# Patient Record
Sex: MALE | Race: WHITE | NOT HISPANIC OR LATINO | Employment: FULL TIME | URBAN - METROPOLITAN AREA
[De-identification: names, ages, dates, MRNs, and addresses within clinical notes are randomized per-mention and may not be internally consistent; named-entity substitution may affect disease eponyms.]

---

## 2017-01-28 ENCOUNTER — TELEPHONE (OUTPATIENT)
Dept: INFECTIOUS DISEASES | Facility: CLINIC | Age: 67
End: 2017-01-28

## 2017-01-28 DIAGNOSIS — I10 ESSENTIAL HYPERTENSION: ICD-10-CM

## 2017-01-28 RX ORDER — METOPROLOL SUCCINATE 50 MG/1
50 TABLET, EXTENDED RELEASE ORAL DAILY
Qty: 10 TABLET | Refills: 3 | Status: SHIPPED | OUTPATIENT
Start: 2017-01-28 | End: 2017-01-30 | Stop reason: SDUPTHER

## 2017-01-30 DIAGNOSIS — E78.00 PURE HYPERCHOLESTEROLEMIA: ICD-10-CM

## 2017-01-30 DIAGNOSIS — I10 ESSENTIAL HYPERTENSION: ICD-10-CM

## 2017-01-30 RX ORDER — ATORVASTATIN CALCIUM 20 MG/1
20 TABLET, FILM COATED ORAL DAILY
Qty: 90 TABLET | Refills: 3 | Status: SHIPPED | OUTPATIENT
Start: 2017-01-30 | End: 2018-01-23 | Stop reason: SDUPTHER

## 2017-01-30 RX ORDER — METOPROLOL SUCCINATE 50 MG/1
50 TABLET, EXTENDED RELEASE ORAL DAILY
Qty: 90 TABLET | Refills: 3 | Status: SHIPPED | OUTPATIENT
Start: 2017-01-30 | End: 2018-01-23 | Stop reason: SDUPTHER

## 2017-03-20 ENCOUNTER — OFFICE VISIT (OUTPATIENT)
Dept: RHEUMATOLOGY | Facility: CLINIC | Age: 67
End: 2017-03-20
Payer: COMMERCIAL

## 2017-03-20 VITALS
HEART RATE: 63 BPM | DIASTOLIC BLOOD PRESSURE: 89 MMHG | SYSTOLIC BLOOD PRESSURE: 163 MMHG | WEIGHT: 200.38 LBS | HEIGHT: 73 IN | BODY MASS INDEX: 26.56 KG/M2

## 2017-03-20 DIAGNOSIS — M70.21 OLECRANON BURSITIS OF RIGHT ELBOW: Primary | ICD-10-CM

## 2017-03-20 LAB
APPEARANCE FLD: NORMAL
BODY FLD TYPE: NORMAL
BODY FLD TYPE: NORMAL
COLOR FLD: YELLOW
CRYSTALS FLD MICRO: NEGATIVE
LYMPHOCYTES NFR FLD MANUAL: 23 %
MONOS+MACROS NFR FLD MANUAL: 6 %
NEUTROPHILS NFR FLD MANUAL: 71 %
WBC # FLD: 1916 /CU MM

## 2017-03-20 PROCEDURE — 87205 SMEAR GRAM STAIN: CPT

## 2017-03-20 PROCEDURE — 89051 BODY FLUID CELL COUNT: CPT

## 2017-03-20 PROCEDURE — 89060 EXAM SYNOVIAL FLUID CRYSTALS: CPT

## 2017-03-20 PROCEDURE — 99212 OFFICE O/P EST SF 10 MIN: CPT | Mod: 25,S$GLB,, | Performed by: INTERNAL MEDICINE

## 2017-03-20 PROCEDURE — 87070 CULTURE OTHR SPECIMN AEROBIC: CPT

## 2017-03-20 PROCEDURE — 99999 PR PBB SHADOW E&M-EST. PATIENT-LVL III: CPT | Mod: PBBFAC,,, | Performed by: INTERNAL MEDICINE

## 2017-03-20 PROCEDURE — 20605 DRAIN/INJ JOINT/BURSA W/O US: CPT | Mod: RT,S$GLB,, | Performed by: INTERNAL MEDICINE

## 2017-03-20 ASSESSMENT — ROUTINE ASSESSMENT OF PATIENT INDEX DATA (RAPID3)
TOTAL RAPID3 SCORE: .17
PATIENT GLOBAL ASSESSMENT SCORE: 0
MDHAQ FUNCTION SCORE: 0
PSYCHOLOGICAL DISTRESS SCORE: 0
PAIN SCORE: .5
AM STIFFNESS SCORE: 0, NO
FATIGUE SCORE: 0

## 2017-03-20 NOTE — PROGRESS NOTES
"Subjective:       Patient ID: Jacques Munoz is a 67 y.o. male.    Chief Complaint: Disease Management    HPI   2 weeks ago he noted pain at the right olecranon and on palpation expressed some sebaceous material.  There was redness and tenderness and he took Bactrim for 1 week.  It improved.    Last week he had some persistent pain and swelling and he has taken some ibuprofen intermittently.    There is some persistent swelling and tenderness today and he requested that I take a look at it.  He has not had any systemic symptoms such as fever or chills or malaise or flulike symptoms  No other infections    Past medical history of hypertension and hyperlipidemia for which she takes metoprolol and atorvastatin    Review of Systems      Objective:   BP (!) 163/89  Pulse 63  Ht 6' 1" (1.854 m)  Wt 90.9 kg (200 lb 6.4 oz)  BMI 26.44 kg/m2     Physical Exam    Right elbow moves normally but there is swelling at the olecranon with some diffuse thickening and a quarter-sized area of erythema at the point of the olecranon.  It is slightly tender.  There is no axillary lymphadenopathy  Assessment:       1. Olecranon bursitis of right elbow        I suspect that this is mechanical, trauma related olecranon bursitis that is slowly improving.  However, he did initially have exquisite redness and tenderness that may have represented a low-grade streptococcal or staphylococcal olecranon bursitis which he treated with Bactrim.   Plan:            1.  In an abundance of caution, I will aspirate the olecranon to send fluid for cultures  2.  He will continue to treat conservatively by avoiding trauma to the olecranon and using NSAIDs as needed  3.  he will follow-up by telephone or as needed in the future      "

## 2017-03-20 NOTE — PROCEDURES
Intermediate Joint Aspiration/Injection  Date/Time: 3/20/2017 5:33 PM  Performed by: CINTIA HASSAN  Authorized by: CINTIA HASSAN     Consent Done?:  Yes (Verbal)  Indications:  Pain and diagnostic evaluation  Site marked: The procedure site was marked    Timeout: Prior to procedure the correct patient, procedure, and site was verified      Location:  Elbow  Site:  R olecranon bursa  Prep: Patient was prepped and draped in usual sterile fashion    Ultrasonic Guidance for needle placement: No  Needle size:  22 G  Approach:  Superior  Aspirate amount (ml):  6  Aspirate:  Clear and yellow  Lab: Fluid sent for laboratory analysis    Patient tolerance:  Patient tolerated the procedure well with no immediate complications

## 2017-03-21 LAB
GRAM STN SPEC: NORMAL
GRAM STN SPEC: NORMAL
PATH INTERP FLD-IMP: NORMAL

## 2017-03-23 DIAGNOSIS — M70.21 OLECRANON BURSITIS OF RIGHT ELBOW: Primary | ICD-10-CM

## 2017-03-24 LAB — BACTERIA SPEC AEROBE CULT: NO GROWTH

## 2017-03-28 ENCOUNTER — OFFICE VISIT (OUTPATIENT)
Dept: RHEUMATOLOGY | Facility: CLINIC | Age: 67
End: 2017-03-28
Payer: COMMERCIAL

## 2017-03-28 VITALS
HEART RATE: 52 BPM | SYSTOLIC BLOOD PRESSURE: 153 MMHG | HEIGHT: 73 IN | BODY MASS INDEX: 25.78 KG/M2 | WEIGHT: 194.5 LBS | DIASTOLIC BLOOD PRESSURE: 93 MMHG

## 2017-03-28 DIAGNOSIS — M70.21 OLECRANON BURSITIS OF RIGHT ELBOW: Primary | ICD-10-CM

## 2017-03-28 PROCEDURE — 20605 DRAIN/INJ JOINT/BURSA W/O US: CPT | Mod: RT,S$GLB,, | Performed by: INTERNAL MEDICINE

## 2017-03-28 PROCEDURE — 99999 PR PBB SHADOW E&M-EST. PATIENT-LVL III: CPT | Mod: PBBFAC,,, | Performed by: INTERNAL MEDICINE

## 2017-03-28 PROCEDURE — 99499 UNLISTED E&M SERVICE: CPT | Mod: S$GLB,,, | Performed by: INTERNAL MEDICINE

## 2017-03-28 RX ORDER — TRIAMCINOLONE ACETONIDE 40 MG/ML
40 INJECTION, SUSPENSION INTRA-ARTICULAR; INTRAMUSCULAR
Status: DISCONTINUED | OUTPATIENT
Start: 2017-03-28 | End: 2017-03-28 | Stop reason: HOSPADM

## 2017-03-28 RX ADMIN — TRIAMCINOLONE ACETONIDE 40 MG: 40 INJECTION, SUSPENSION INTRA-ARTICULAR; INTRAMUSCULAR at 05:03

## 2017-03-28 ASSESSMENT — ROUTINE ASSESSMENT OF PATIENT INDEX DATA (RAPID3)
PAIN SCORE: 0
AM STIFFNESS SCORE: 0, NO
TOTAL RAPID3 SCORE: 0
FATIGUE SCORE: 0
MDHAQ FUNCTION SCORE: 0
PSYCHOLOGICAL DISTRESS SCORE: 0
PATIENT GLOBAL ASSESSMENT SCORE: 0

## 2017-03-28 NOTE — PROCEDURES
Intermediate Joint Aspiration/Injection  Date/Time: 3/28/2017 5:14 PM  Performed by: CINTIA HASSAN  Authorized by: CINTIA HASSAN     Consent Done?:  Yes (Verbal)  Indications:  Joint swelling  Site marked: The procedure site was marked    Timeout: Prior to procedure the correct patient, procedure, and site was verified      Location:  Elbow  Site:  R olecranon bursa  Prep: Patient was prepped and draped in usual sterile fashion    Ultrasonic Guidance for needle placement: No  Needle size:  20 G  Medications:  40 mg triamcinolone acetonide 40 mg/mL  Aspirate amount (ml):  4.5  Aspirate:  Clear and yellow  Patient tolerance:  Patient tolerated the procedure well with no immediate complications

## 2017-05-18 ENCOUNTER — LAB VISIT (OUTPATIENT)
Dept: LAB | Facility: HOSPITAL | Age: 67
End: 2017-05-18
Attending: INTERNAL MEDICINE
Payer: COMMERCIAL

## 2017-05-18 DIAGNOSIS — I10 ESSENTIAL HYPERTENSION: ICD-10-CM

## 2017-05-18 DIAGNOSIS — M70.21 OLECRANON BURSITIS OF RIGHT ELBOW: ICD-10-CM

## 2017-05-18 DIAGNOSIS — E78.5 HYPERLIPIDEMIA: ICD-10-CM

## 2017-05-18 LAB
ALBUMIN SERPL BCP-MCNC: 3.7 G/DL
ALP SERPL-CCNC: 74 U/L
ALT SERPL W/O P-5'-P-CCNC: 24 U/L
ANION GAP SERPL CALC-SCNC: 4 MMOL/L
AST SERPL-CCNC: 23 U/L
BILIRUB SERPL-MCNC: 1.8 MG/DL
BUN SERPL-MCNC: 11 MG/DL
CALCIUM SERPL-MCNC: 9.1 MG/DL
CHLORIDE SERPL-SCNC: 101 MMOL/L
CHOLEST/HDLC SERPL: 2.4 {RATIO}
CO2 SERPL-SCNC: 31 MMOL/L
COMPLEXED PSA SERPL-MCNC: 0.64 NG/ML
CREAT SERPL-MCNC: 1 MG/DL
ERYTHROCYTE [DISTWIDTH] IN BLOOD BY AUTOMATED COUNT: 12.9 %
EST. GFR  (AFRICAN AMERICAN): >60 ML/MIN/1.73 M^2
EST. GFR  (NON AFRICAN AMERICAN): >60 ML/MIN/1.73 M^2
GLUCOSE SERPL-MCNC: 109 MG/DL
HCT VFR BLD AUTO: 45.8 %
HDL/CHOLESTEROL RATIO: 41.7 %
HDLC SERPL-MCNC: 223 MG/DL
HDLC SERPL-MCNC: 93 MG/DL
HGB BLD-MCNC: 15.6 G/DL
LDLC SERPL CALC-MCNC: 120.2 MG/DL
MCH RBC QN AUTO: 32.8 PG
MCHC RBC AUTO-ENTMCNC: 34.1 %
MCV RBC AUTO: 96 FL
NEUTROPHILS # BLD AUTO: 2.3 K/UL
NONHDLC SERPL-MCNC: 130 MG/DL
PLATELET # BLD AUTO: 180 K/UL
PMV BLD AUTO: 9.7 FL
POTASSIUM SERPL-SCNC: 4 MMOL/L
PROT SERPL-MCNC: 6.7 G/DL
RBC # BLD AUTO: 4.75 M/UL
SODIUM SERPL-SCNC: 136 MMOL/L
TRIGL SERPL-MCNC: 49 MG/DL
TSH SERPL DL<=0.005 MIU/L-ACNC: 1.42 UIU/ML
URATE SERPL-MCNC: 5.6 MG/DL
WBC # BLD AUTO: 4.87 K/UL

## 2017-05-18 PROCEDURE — 80053 COMPREHEN METABOLIC PANEL: CPT

## 2017-05-18 PROCEDURE — 85027 COMPLETE CBC AUTOMATED: CPT

## 2017-05-18 PROCEDURE — 36415 COLL VENOUS BLD VENIPUNCTURE: CPT

## 2017-05-18 PROCEDURE — 84443 ASSAY THYROID STIM HORMONE: CPT

## 2017-05-18 PROCEDURE — 84153 ASSAY OF PSA TOTAL: CPT

## 2017-05-18 PROCEDURE — 84550 ASSAY OF BLOOD/URIC ACID: CPT

## 2017-05-18 PROCEDURE — 80061 LIPID PANEL: CPT

## 2017-05-22 ENCOUNTER — OFFICE VISIT (OUTPATIENT)
Dept: HEMATOLOGY/ONCOLOGY | Facility: CLINIC | Age: 67
End: 2017-05-22
Payer: COMMERCIAL

## 2017-05-22 VITALS
SYSTOLIC BLOOD PRESSURE: 138 MMHG | RESPIRATION RATE: 17 BRPM | WEIGHT: 195.13 LBS | BODY MASS INDEX: 25.74 KG/M2 | TEMPERATURE: 98 F | DIASTOLIC BLOOD PRESSURE: 84 MMHG | HEART RATE: 74 BPM

## 2017-05-22 DIAGNOSIS — I10 ESSENTIAL HYPERTENSION: Primary | ICD-10-CM

## 2017-05-22 DIAGNOSIS — E78.00 PURE HYPERCHOLESTEROLEMIA: ICD-10-CM

## 2017-05-22 PROCEDURE — 99999 PR PBB SHADOW E&M-EST. PATIENT-LVL III: CPT | Mod: PBBFAC,,, | Performed by: INTERNAL MEDICINE

## 2017-05-22 PROCEDURE — 99213 OFFICE O/P EST LOW 20 MIN: CPT | Mod: S$GLB,,, | Performed by: INTERNAL MEDICINE

## 2017-05-22 NOTE — PROGRESS NOTES
Subjective:       Patient ID: Jacques Munoz is a 67 y.o. male.    Chief Complaint: No chief complaint on file.  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           HPI    Review of Systems   Constitutional: Negative for activity change, appetite change, fever and unexpected weight change.   Eyes: Positive for pain. Negative for visual disturbance.   Respiratory: Negative for cough, choking and shortness of breath.    Cardiovascular: Negative for chest pain.   Gastrointestinal: Negative for abdominal pain, blood in stool, constipation and diarrhea.   Genitourinary: Negative for dysuria and frequency.   Musculoskeletal: Negative for back pain.   Skin: Negative for rash.   Neurological: Negative for headaches.   Hematological: Negative for adenopathy.   Psychiatric/Behavioral: Negative for dysphoric mood. The patient is not nervous/anxious.        Objective:      Physical Exam   Constitutional: He is oriented to person, place, and time. He appears well-developed and well-nourished. No distress.   Eyes: No scleral icterus.   Neck: No JVD present.   Cardiovascular: Normal rate, regular rhythm and normal heart sounds.    Pulmonary/Chest: Effort normal and breath sounds normal. He has no wheezes. He has no rales.   Abdominal: Soft. He exhibits no mass. There is no tenderness. Hernia confirmed negative in the right inguinal area and confirmed negative in the left inguinal area.   Genitourinary: Rectum normal, testes normal and penis normal. Rectal exam shows anal tone normal and guaiac negative stool. Prostate is not enlarged and not tender. Right testis shows no mass. Left testis shows no mass.   Musculoskeletal: He exhibits no edema.   Lymphadenopathy:     He has no cervical adenopathy.     He has no axillary adenopathy.        Right: No supraclavicular adenopathy present.         Left: No supraclavicular adenopathy present.   Neurological: He is alert and oriented to person, place, and time. No cranial nerve deficit.   Skin: No rash noted.   Psychiatric: He has a normal mood and affect. His behavior is normal. Thought content normal.       Assessment:    CBC and metabolic profile are unremarkable, PSA and TSH were normal. Chol 223 with , HDL 93    1. Essential hypertension    2. Pure hypercholesterolemia        Plan:       He will continue his current medications and return in one year for followup with labs.

## 2017-06-01 ENCOUNTER — OFFICE VISIT (OUTPATIENT)
Dept: OPHTHALMOLOGY | Facility: CLINIC | Age: 67
End: 2017-06-01
Payer: COMMERCIAL

## 2017-06-01 DIAGNOSIS — S05.02XA CORNEAL ABRASION, LEFT, INITIAL ENCOUNTER: Primary | ICD-10-CM

## 2017-06-01 PROCEDURE — 99999 PR PBB SHADOW E&M-EST. PATIENT-LVL II: CPT | Mod: PBBFAC,,, | Performed by: OPHTHALMOLOGY

## 2017-06-01 PROCEDURE — 92012 INTRM OPH EXAM EST PATIENT: CPT | Mod: S$GLB,,, | Performed by: OPHTHALMOLOGY

## 2017-06-01 NOTE — PROGRESS NOTES
HPI     Eye Pain    Additional comments: woke up about 5:00 this a.m. pain and tearing os           Comments   Instilled 1 drop of proparacain os at 8:50       Last edited by Rowena Chu on 6/1/2017  8:53 AM. (History)            Assessment /Plan     For exam results, see Encounter Report.    Corneal abrasion, left, initial encounter      Patient with small central multifocal abrasion of unknown etiology. Place bcl today and start vigamox qid os.  Discussed possible very early viral etiology but patient denies any prior history of herpetic viral episodes.  Return tomorrow for follow-up with cornea for consideration of removal of bcl if epithelial defect is healed.                             family

## 2017-06-01 NOTE — PROGRESS NOTES
Assessment /Plan     For exam results, see Encounter Report.    Corneal abrasion, left, initial encounter      ***

## 2017-06-02 ENCOUNTER — OFFICE VISIT (OUTPATIENT)
Dept: OPHTHALMOLOGY | Facility: CLINIC | Age: 67
End: 2017-06-02
Payer: COMMERCIAL

## 2017-06-02 DIAGNOSIS — B00.52 HERPES KERATITIS OF LEFT EYE: Primary | ICD-10-CM

## 2017-06-02 PROCEDURE — 92014 COMPRE OPH EXAM EST PT 1/>: CPT | Mod: S$GLB,,, | Performed by: OPHTHALMOLOGY

## 2017-06-02 PROCEDURE — 99999 PR PBB SHADOW E&M-EST. PATIENT-LVL II: CPT | Mod: PBBFAC,,, | Performed by: OPHTHALMOLOGY

## 2017-06-02 RX ORDER — VALACYCLOVIR HYDROCHLORIDE 500 MG/1
500 TABLET, FILM COATED ORAL 3 TIMES DAILY
Qty: 30 TABLET | Refills: 0 | Status: SHIPPED | OUTPATIENT
Start: 2017-06-02 | End: 2019-11-19 | Stop reason: CLARIF

## 2017-07-10 RX ORDER — AZITHROMYCIN 250 MG/1
TABLET, FILM COATED ORAL
Qty: 6 TABLET | Refills: 0 | Status: SHIPPED | OUTPATIENT
Start: 2017-07-10 | End: 2017-09-13

## 2017-09-13 RX ORDER — ZOLPIDEM TARTRATE 5 MG/1
5 TABLET ORAL NIGHTLY PRN
Qty: 30 TABLET | Refills: 4 | Status: SHIPPED | OUTPATIENT
Start: 2017-09-13 | End: 2021-05-24 | Stop reason: ALTCHOICE

## 2017-09-13 RX ORDER — AZITHROMYCIN 500 MG/1
500 TABLET, FILM COATED ORAL DAILY
Qty: 3 TABLET | Refills: 0 | Status: SHIPPED | OUTPATIENT
Start: 2017-09-13 | End: 2017-09-16

## 2017-09-18 ENCOUNTER — CLINICAL SUPPORT (OUTPATIENT)
Dept: INFECTIOUS DISEASES | Facility: CLINIC | Age: 67
End: 2017-09-18
Payer: COMMERCIAL

## 2017-09-18 DIAGNOSIS — Z23 NEED FOR VACCINATION: Primary | ICD-10-CM

## 2017-09-18 PROCEDURE — 90662 IIV NO PRSV INCREASED AG IM: CPT | Mod: S$GLB,,, | Performed by: INTERNAL MEDICINE

## 2017-09-18 PROCEDURE — 90471 IMMUNIZATION ADMIN: CPT | Mod: S$GLB,,, | Performed by: INTERNAL MEDICINE

## 2017-09-18 PROCEDURE — 99999 PR PBB SHADOW E&M-EST. PATIENT-LVL I: CPT | Mod: PBBFAC,,,

## 2017-11-30 RX ORDER — OSELTAMIVIR PHOSPHATE 75 MG/1
75 CAPSULE ORAL 2 TIMES DAILY
Qty: 10 CAPSULE | Refills: 0 | Status: SHIPPED | OUTPATIENT
Start: 2017-11-30 | End: 2017-12-05

## 2017-12-04 RX ORDER — AZITHROMYCIN 250 MG/1
TABLET, FILM COATED ORAL
Qty: 6 TABLET | Refills: 0 | Status: SHIPPED | OUTPATIENT
Start: 2017-12-04 | End: 2018-03-08

## 2018-01-12 ENCOUNTER — OFFICE VISIT (OUTPATIENT)
Dept: DERMATOLOGY | Facility: CLINIC | Age: 68
End: 2018-01-12
Payer: COMMERCIAL

## 2018-01-12 DIAGNOSIS — Z12.83 SCREENING EXAM FOR SKIN CANCER: ICD-10-CM

## 2018-01-12 DIAGNOSIS — L82.1 SEBORRHEIC KERATOSIS: ICD-10-CM

## 2018-01-12 DIAGNOSIS — L82.0 INFLAMED SEBORRHEIC KERATOSIS: Primary | ICD-10-CM

## 2018-01-12 DIAGNOSIS — D18.00 ANGIOMA: ICD-10-CM

## 2018-01-12 DIAGNOSIS — L81.4 LENTIGO: ICD-10-CM

## 2018-01-12 PROCEDURE — 17110 DESTRUCTION B9 LES UP TO 14: CPT | Mod: S$GLB,,, | Performed by: DERMATOLOGY

## 2018-01-12 PROCEDURE — 99213 OFFICE O/P EST LOW 20 MIN: CPT | Mod: 25,S$GLB,, | Performed by: DERMATOLOGY

## 2018-01-12 PROCEDURE — 99999 PR PBB SHADOW E&M-EST. PATIENT-LVL II: CPT | Mod: PBBFAC,,, | Performed by: DERMATOLOGY

## 2018-01-12 NOTE — PATIENT INSTRUCTIONS

## 2018-01-23 DIAGNOSIS — E78.00 PURE HYPERCHOLESTEROLEMIA: ICD-10-CM

## 2018-01-23 DIAGNOSIS — I10 ESSENTIAL HYPERTENSION: ICD-10-CM

## 2018-01-23 RX ORDER — ATORVASTATIN CALCIUM 20 MG/1
TABLET, FILM COATED ORAL
Qty: 90 TABLET | Refills: 2 | Status: SHIPPED | OUTPATIENT
Start: 2018-01-23 | End: 2018-10-23

## 2018-01-23 RX ORDER — METOPROLOL SUCCINATE 50 MG/1
50 TABLET, EXTENDED RELEASE ORAL DAILY
Qty: 90 TABLET | Refills: 3 | Status: SHIPPED | OUTPATIENT
Start: 2018-01-23 | End: 2019-01-28

## 2018-03-08 ENCOUNTER — OFFICE VISIT (OUTPATIENT)
Dept: UROLOGY | Facility: CLINIC | Age: 68
End: 2018-03-08
Payer: COMMERCIAL

## 2018-03-08 VITALS
BODY MASS INDEX: 25.74 KG/M2 | RESPIRATION RATE: 16 BRPM | WEIGHT: 194.25 LBS | SYSTOLIC BLOOD PRESSURE: 144 MMHG | HEART RATE: 60 BPM | HEIGHT: 73 IN | DIASTOLIC BLOOD PRESSURE: 87 MMHG

## 2018-03-08 DIAGNOSIS — N40.1 BENIGN PROSTATIC HYPERPLASIA WITH NOCTURIA: ICD-10-CM

## 2018-03-08 DIAGNOSIS — R35.1 BENIGN PROSTATIC HYPERPLASIA WITH NOCTURIA: ICD-10-CM

## 2018-03-08 LAB
BILIRUB SERPL-MCNC: NORMAL MG/DL
BLOOD URINE, POC: NORMAL
COLOR, POC UA: NORMAL
GLUCOSE UR QL STRIP: NORMAL
KETONES UR QL STRIP: NORMAL
LEUKOCYTE ESTERASE URINE, POC: NORMAL
NITRITE, POC UA: NORMAL
PH, POC UA: 5
POC RESIDUAL URINE VOLUME: 98 ML (ref 0–100)
PROTEIN, POC: NORMAL
SPECIFIC GRAVITY, POC UA: 1.02
UROBILINOGEN, POC UA: NORMAL

## 2018-03-08 PROCEDURE — 3077F SYST BP >= 140 MM HG: CPT | Mod: S$GLB,,, | Performed by: UROLOGY

## 2018-03-08 PROCEDURE — 3079F DIAST BP 80-89 MM HG: CPT | Mod: S$GLB,,, | Performed by: UROLOGY

## 2018-03-08 PROCEDURE — 51798 US URINE CAPACITY MEASURE: CPT | Mod: S$GLB,,, | Performed by: UROLOGY

## 2018-03-08 PROCEDURE — 81002 URINALYSIS NONAUTO W/O SCOPE: CPT | Mod: S$GLB,,, | Performed by: UROLOGY

## 2018-03-08 PROCEDURE — 99999 PR PBB SHADOW E&M-EST. PATIENT-LVL III: CPT | Mod: PBBFAC,,, | Performed by: UROLOGY

## 2018-03-08 PROCEDURE — 99203 OFFICE O/P NEW LOW 30 MIN: CPT | Mod: 25,S$GLB,, | Performed by: UROLOGY

## 2018-03-08 RX ORDER — TAMSULOSIN HYDROCHLORIDE 0.4 MG/1
0.4 CAPSULE ORAL DAILY
Qty: 90 CAPSULE | Refills: 3 | Status: SHIPPED | OUTPATIENT
Start: 2018-03-08 | End: 2019-11-19 | Stop reason: CLARIF

## 2018-03-08 NOTE — LETTER
March 8, 2018        Wilmer Ohara MD  [address] [facility] [address]             [facility]  [address]  [place] [address]  Phone: [phone]   Patient: Jacques Munoz M.D.   MR Number: [id number]   YOB: 1950   Date of Visit: 3/8/2018       Dear Dr. Ohara:    Thank you for referring Jacques Munoz M.D. to me for evaluation. Attached you will find relevant portions of my assessment and plan of care.    If you have questions, please do not hesitate to call me. I look forward to following Jacques Munoz M.D. along with you.    Sincerely,      Wes Weldon MD            CC  No Recipients    Enclosure

## 2018-03-08 NOTE — PROGRESS NOTES
Clinic Note  3/8/2018      Subjective:         Chief Complaint:   HPI  Jacques Munoz is a 68 y.o. male with a history of BPH. Has noted increased daytime frequency and nocturia (4 x/noc). Voiding every two hours. Symptoms progressive for 2 years but worse in last two months.  Last visit 12/13/2011 for prostatitis.  post void residual today- 98 ccs      Lab Results   Component Value Date    PSA 0.64 05/18/2017    PSA 0.63 04/21/2016    PSA 0.52 04/27/2015    PSA 0.63 04/22/2014    PSA 0.57 04/22/2013    PSA 0.96 02/13/2012    PSA 0.54 01/10/2011    PSA 0.39 12/02/2009    PSA 0.46 12/04/2008    PSA 0.4 11/27/2007      Past Medical History:   Diagnosis Date    Hypertension      Family History   Problem Relation Age of Onset    Hypertension Mother     Hypertension Father     Melanoma Neg Hx      Social History     Social History    Marital status:      Spouse name: N/A    Number of children: N/A    Years of education: N/A     Occupational History    physician      Social History Main Topics    Smoking status: Never Smoker    Smokeless tobacco: Never Used    Alcohol use Yes    Drug use: Unknown    Sexual activity: Not on file     Other Topics Concern    Not on file     Social History Narrative    No narrative on file     Past Surgical History:   Procedure Laterality Date    TONSILLECTOMY       Patient Active Problem List   Diagnosis    Hypertension    Hyperlipidemia    Presbyopia - Both Eyes    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   HENT: Negative for nosebleeds.    Respiratory: Negative for shortness of breath and wheezing.    Cardiovascular: Negative for chest  "pain, palpitations and leg swelling.   Gastrointestinal: Negative for abdominal distention, abdominal pain, constipation, diarrhea, nausea and vomiting.   Genitourinary: Positive for difficulty urinating, frequency and nocturia. Negative for dysuria and hematuria.   Musculoskeletal: Negative for arthralgias and back pain.   Skin: Negative for pallor.   Neurological: Negative for dizziness, seizures and syncope.   Hematological: Negative for adenopathy.   Psychiatric/Behavioral: Negative for dysphoric mood.         Objective:      There were no vitals taken for this visit.  Estimated body mass index is 25.74 kg/m² as calculated from the following:    Height as of 3/28/17: 6' 1" (1.854 m).    Weight as of 5/22/17: 88.5 kg (195 lb 1.7 oz).  Physical Exam   Genitourinary: Rectum normal and prostate normal. Rectal exam shows no external hemorrhoid, no internal hemorrhoid, no mass and no tenderness.         Assessment and Plan:           Problem List Items Addressed This Visit     None          Follow up:   BPH  Flomax    Wes Weldon        "

## 2018-05-01 ENCOUNTER — LAB VISIT (OUTPATIENT)
Dept: LAB | Facility: HOSPITAL | Age: 68
End: 2018-05-01
Attending: INTERNAL MEDICINE
Payer: COMMERCIAL

## 2018-05-01 DIAGNOSIS — I10 ESSENTIAL HYPERTENSION: ICD-10-CM

## 2018-05-01 DIAGNOSIS — E78.00 PURE HYPERCHOLESTEROLEMIA: ICD-10-CM

## 2018-05-01 LAB
ALBUMIN SERPL BCP-MCNC: 3.7 G/DL
ALP SERPL-CCNC: 76 U/L
ALT SERPL W/O P-5'-P-CCNC: 20 U/L
ANION GAP SERPL CALC-SCNC: 7 MMOL/L
AST SERPL-CCNC: 26 U/L
BILIRUB SERPL-MCNC: 2.2 MG/DL
BUN SERPL-MCNC: 12 MG/DL
CALCIUM SERPL-MCNC: 9.3 MG/DL
CHLORIDE SERPL-SCNC: 106 MMOL/L
CHOLEST SERPL-MCNC: 192 MG/DL
CHOLEST/HDLC SERPL: 2.4 {RATIO}
CO2 SERPL-SCNC: 28 MMOL/L
COMPLEXED PSA SERPL-MCNC: 0.67 NG/ML
CREAT SERPL-MCNC: 0.8 MG/DL
ERYTHROCYTE [DISTWIDTH] IN BLOOD BY AUTOMATED COUNT: 12 %
EST. GFR  (AFRICAN AMERICAN): >60 ML/MIN/1.73 M^2
EST. GFR  (NON AFRICAN AMERICAN): >60 ML/MIN/1.73 M^2
GLUCOSE SERPL-MCNC: 104 MG/DL
HCT VFR BLD AUTO: 43.6 %
HDLC SERPL-MCNC: 81 MG/DL
HDLC SERPL: 42.2 %
HGB BLD-MCNC: 15.1 G/DL
IMM GRANULOCYTES # BLD AUTO: 0.03 K/UL
LDLC SERPL CALC-MCNC: 89 MG/DL
MCH RBC QN AUTO: 32.9 PG
MCHC RBC AUTO-ENTMCNC: 34.6 G/DL
MCV RBC AUTO: 95 FL
NEUTROPHILS # BLD AUTO: 2.4 K/UL
NONHDLC SERPL-MCNC: 111 MG/DL
PLATELET # BLD AUTO: 207 K/UL
PMV BLD AUTO: 9.7 FL
POTASSIUM SERPL-SCNC: 4 MMOL/L
PROT SERPL-MCNC: 6.5 G/DL
RBC # BLD AUTO: 4.59 M/UL
SODIUM SERPL-SCNC: 141 MMOL/L
TRIGL SERPL-MCNC: 110 MG/DL
TSH SERPL DL<=0.005 MIU/L-ACNC: 2.08 UIU/ML
WBC # BLD AUTO: 5.38 K/UL

## 2018-05-01 PROCEDURE — 36415 COLL VENOUS BLD VENIPUNCTURE: CPT

## 2018-05-01 PROCEDURE — 80061 LIPID PANEL: CPT

## 2018-05-01 PROCEDURE — 80053 COMPREHEN METABOLIC PANEL: CPT

## 2018-05-01 PROCEDURE — 85027 COMPLETE CBC AUTOMATED: CPT

## 2018-05-01 PROCEDURE — 84443 ASSAY THYROID STIM HORMONE: CPT

## 2018-05-01 PROCEDURE — 84153 ASSAY OF PSA TOTAL: CPT

## 2018-05-07 NOTE — PROGRESS NOTES
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    Chief Complaint: No chief complaint on file.      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    Overall he's been doing well.  He had a recent episode of some increased nocturia and was seen in urology by Dr. Weldon.  That evaluation was negative.  The symptoms have resolved.    He's going to follow-up in cardiology for his aortic aneurysm in the early part of next year.  He continues to be active exercising on a regular basis.  Has some minor arthralgias in his hands but does not take any medication for that.  He reports his blood pressure at home has been running in the 130/80 range.    Review of Systems   Constitutional: Negative for activity change, appetite change, fever and unexpected weight change.   Eyes: Positive for pain. Negative for visual disturbance.   Respiratory: Negative for cough, choking and shortness of breath.    Cardiovascular: Negative for chest pain.   Gastrointestinal: Negative for abdominal pain, blood in stool, constipation and diarrhea.   Genitourinary: Negative for dysuria and frequency.   Musculoskeletal: Negative for back pain.   Skin: Negative for rash.   Neurological: Negative for headaches.   Hematological: Negative for adenopathy.   Psychiatric/Behavioral: Negative for dysphoric mood. The patient is not nervous/anxious.        Objective:      Physical Exam   Constitutional: He is oriented to person, place, and time. He appears well-developed and well-nourished. No distress.   Eyes: No scleral icterus.   Neck: No JVD present.   Cardiovascular: Normal rate, regular rhythm and normal heart sounds.    Pulmonary/Chest: Effort normal and breath sounds normal. He has no wheezes. He has no rales.   Abdominal: Soft. He exhibits no mass. There is no tenderness. Hernia  confirmed negative in the right inguinal area and confirmed negative in the left inguinal area.   Genitourinary: Rectum normal, testes normal and penis normal. Rectal exam shows anal tone normal and guaiac negative stool. Prostate is not enlarged and not tender. Right testis shows no mass. Left testis shows no mass.   Musculoskeletal: He exhibits no edema.   Lymphadenopathy:     He has no cervical adenopathy.     He has no axillary adenopathy.        Right: No supraclavicular adenopathy present.        Left: No supraclavicular adenopathy present.   Neurological: He is alert and oriented to person, place, and time. No cranial nerve deficit.   Skin: No rash noted.   Psychiatric: He has a normal mood and affect. His behavior is normal. Thought content normal.       Assessment:    CBC and metabolic profile are unremarkable, PSA and TSH are normal, lipid profile is normal.    1. Essential hypertension    2. Pure hypercholesterolemia        Plan:     Follow-up in cardiology as planned  He will continue his current medications and return in one year for followup with labs.

## 2018-05-08 ENCOUNTER — OFFICE VISIT (OUTPATIENT)
Dept: HEMATOLOGY/ONCOLOGY | Facility: CLINIC | Age: 68
End: 2018-05-08
Payer: COMMERCIAL

## 2018-05-08 VITALS
SYSTOLIC BLOOD PRESSURE: 150 MMHG | WEIGHT: 194.25 LBS | BODY MASS INDEX: 25.62 KG/M2 | DIASTOLIC BLOOD PRESSURE: 95 MMHG

## 2018-05-08 DIAGNOSIS — Z12.5 PROSTATE CANCER SCREENING: ICD-10-CM

## 2018-05-08 DIAGNOSIS — I10 ESSENTIAL HYPERTENSION: Primary | ICD-10-CM

## 2018-05-08 DIAGNOSIS — E78.00 PURE HYPERCHOLESTEROLEMIA: ICD-10-CM

## 2018-05-08 PROCEDURE — 99213 OFFICE O/P EST LOW 20 MIN: CPT | Mod: S$GLB,,, | Performed by: INTERNAL MEDICINE

## 2018-05-08 PROCEDURE — 3080F DIAST BP >= 90 MM HG: CPT | Mod: CPTII,S$GLB,, | Performed by: INTERNAL MEDICINE

## 2018-05-08 PROCEDURE — 99999 PR PBB SHADOW E&M-EST. PATIENT-LVL I: CPT | Mod: PBBFAC,,, | Performed by: INTERNAL MEDICINE

## 2018-05-08 PROCEDURE — 3077F SYST BP >= 140 MM HG: CPT | Mod: CPTII,S$GLB,, | Performed by: INTERNAL MEDICINE

## 2018-10-23 DIAGNOSIS — E78.00 PURE HYPERCHOLESTEROLEMIA: ICD-10-CM

## 2018-10-23 RX ORDER — ATORVASTATIN CALCIUM 20 MG/1
TABLET, FILM COATED ORAL
Qty: 90 TABLET | Refills: 3 | Status: SHIPPED | OUTPATIENT
Start: 2018-10-23 | End: 2019-01-28 | Stop reason: SDUPTHER

## 2019-01-28 ENCOUNTER — PATIENT MESSAGE (OUTPATIENT)
Dept: HEMATOLOGY/ONCOLOGY | Facility: CLINIC | Age: 69
End: 2019-01-28

## 2019-01-28 DIAGNOSIS — I10 ESSENTIAL HYPERTENSION: ICD-10-CM

## 2019-01-28 DIAGNOSIS — E78.00 PURE HYPERCHOLESTEROLEMIA: ICD-10-CM

## 2019-01-28 RX ORDER — METOPROLOL SUCCINATE 50 MG/1
50 TABLET, EXTENDED RELEASE ORAL DAILY
Qty: 90 TABLET | Refills: 3 | Status: CANCELLED | OUTPATIENT
Start: 2019-01-28

## 2019-01-28 RX ORDER — ATORVASTATIN CALCIUM 20 MG/1
20 TABLET, FILM COATED ORAL DAILY
Qty: 90 TABLET | Refills: 3 | Status: CANCELLED | OUTPATIENT
Start: 2019-01-28

## 2019-01-28 RX ORDER — METOPROLOL SUCCINATE 50 MG/1
50 TABLET, EXTENDED RELEASE ORAL DAILY
Qty: 90 TABLET | Refills: 3 | Status: SHIPPED | OUTPATIENT
Start: 2019-01-28 | End: 2020-01-30

## 2019-01-28 RX ORDER — ATORVASTATIN CALCIUM 20 MG/1
TABLET, FILM COATED ORAL
Qty: 90 TABLET | Refills: 3 | Status: SHIPPED | OUTPATIENT
Start: 2019-01-28 | End: 2020-01-30

## 2019-03-12 ENCOUNTER — OFFICE VISIT (OUTPATIENT)
Dept: DERMATOLOGY | Facility: CLINIC | Age: 69
End: 2019-03-12
Payer: COMMERCIAL

## 2019-03-12 DIAGNOSIS — L82.1 SEBORRHEIC KERATOSIS: Primary | ICD-10-CM

## 2019-03-12 DIAGNOSIS — D18.00 ANGIOMA: ICD-10-CM

## 2019-03-12 DIAGNOSIS — Z12.83 SCREENING EXAM FOR SKIN CANCER: ICD-10-CM

## 2019-03-12 DIAGNOSIS — L81.4 LENTIGO: ICD-10-CM

## 2019-03-12 PROCEDURE — 99999 PR PBB SHADOW E&M-EST. PATIENT-LVL II: CPT | Mod: PBBFAC,,, | Performed by: DERMATOLOGY

## 2019-03-12 PROCEDURE — 99999 PR PBB SHADOW E&M-EST. PATIENT-LVL II: ICD-10-PCS | Mod: PBBFAC,,, | Performed by: DERMATOLOGY

## 2019-03-12 PROCEDURE — 99213 OFFICE O/P EST LOW 20 MIN: CPT | Mod: S$GLB,,, | Performed by: DERMATOLOGY

## 2019-03-12 PROCEDURE — 1101F PR PT FALLS ASSESS DOC 0-1 FALLS W/OUT INJ PAST YR: ICD-10-PCS | Mod: CPTII,S$GLB,, | Performed by: DERMATOLOGY

## 2019-03-12 PROCEDURE — 1101F PT FALLS ASSESS-DOCD LE1/YR: CPT | Mod: CPTII,S$GLB,, | Performed by: DERMATOLOGY

## 2019-03-12 PROCEDURE — 99213 PR OFFICE/OUTPT VISIT, EST, LEVL III, 20-29 MIN: ICD-10-PCS | Mod: S$GLB,,, | Performed by: DERMATOLOGY

## 2019-03-12 NOTE — PATIENT INSTRUCTIONS
SEBORRHEIC KERATOSES        What causes seborrheic keratoses?    Seborrheic keratoses are harmless, common skin growths that first appear during adult life.  As time goes by, more growths appear.  Some persons have a very large number of them.  Seborrheic keratoses appear on both covered and uncovered parts of the body; they are not caused by sunlight.  The tendency to develop seborrheic keratoses is inherited.    Seborrheic keratoses are harmless and never become malignant.  They begin as slightly raised, light brown spots.  Gradually they thicken and take on a rough wartlike surface.  They slowly darken and may turn black.  These color changes are harmless.  Seborrheic keratoses are superficial and look as if they were stuck on the skin.  Persons who have had several seborrheic keratoses can usually recognize this type of benign growth.  However, if you are concerned or unsure about any growth, consult me.    Treatment    Seborrheic keratoses can easily be removed in the office.  The only reason for removing a seborrheic keratosis is your wish to get rid of it.      CRYOSURGERY      Your doctor has used a method called cryosurgery to treat your skin condition. Cryosurgery refers to the use of very cold substances to treat a variety of skin conditions such as warts, pre-skin cancers, molluscum contagiosum, sun spots, and several benign growths. The substance we use in cryosurgery is liquid nitrogen and is so cold (-195 degrees Celsius) that is burns when administered.     Following treatment in the office, the skin may immediately burn and become red. You may find the area around the lesion is affected as well. It is sometimes necessary to treat not only the lesion, but a small area of the surrounding normal skin to achieve a good response.     A blister, and even a blood filled blister, may form after treatment.   This is a normal response. If the blister is painful, it is acceptable to sterilize a needle and with  rubbing alcohol and gently pop the blister. It is important that you gently wash the area with soap and warm water as the blister fluid may contain wart virus if a wart was treated. Do no remove the roof of the blister.     The area treated can take anywhere from 1-3 weeks to heal. Healing time depends on the kind skin lesion treated, the location, and how aggressively the lesion was treated. It is recommended that the areas treated are covered with Vaseline or bacitracin ointment and a band-aid. If a band-aid is not practical, just ointment applied several times per day will do. Keeping these areas moist will speed the healing time.    Treatment with liquid nitrogen can leave a scar. In dark skin, it may be a light or dark scar, in light skin it may be a white or pink scar. These will generally fade with time, but may never go away completely.     If you have any concerns after your treatment, please feel free to call the office.       [address] [facility] [place] [address]/ [phone] FAX/ [email or web address]

## 2019-03-12 NOTE — PROGRESS NOTES
Subjective:       Patient ID:  Jacques Munoz is a 69 y.o. male who presents for   Chief Complaint   Patient presents with    Skin Check     UBSE     History of Present Illness: The patient presents for follow up of skin check.    The patient was last seen on: 1/12/18 for skin check  No h/o nmsc or mm    Other skin complaints: none          Review of Systems   Skin: Positive for activity-related sunscreen use. Negative for daily sunscreen use and recent sunburn.   Hematologic/Lymphatic: Does not bruise/bleed easily.        Objective:    Physical Exam   Constitutional: He appears well-developed and well-nourished. No distress.   Neurological: He is alert and oriented to person, place, and time. He is not disoriented.   Psychiatric: He has a normal mood and affect.   Skin:   Areas Examined (abnormalities noted in diagram):   Head / Face Inspection Performed  Neck Inspection Performed  Chest / Axilla Inspection Performed  Back Inspection Performed  RUE Inspected  LUE Inspection Performed                   Diagram Legend     Erythematous scaling macule/papule c/w actinic keratosis       Vascular papule c/w angioma      Pigmented verrucoid papule/plaque c/w seborrheic keratosis      Yellow umbilicated papule c/w sebaceous hyperplasia      Irregularly shaped tan macule c/w lentigo     1-2 mm smooth white papules consistent with Milia      Movable subcutaneous cyst with punctum c/w epidermal inclusion cyst      Subcutaneous movable cyst c/w pilar cyst      Firm pink to brown papule c/w dermatofibroma      Pedunculated fleshy papule(s) c/w skin tag(s)      Evenly pigmented macule c/w junctional nevus     Mildly variegated pigmented, slightly irregular-bordered macule c/w mildly atypical nevus      Flesh colored to evenly pigmented papule c/w intradermal nevus       Pink pearly papule/plaque c/w basal cell carcinoma      Erythematous hyperkeratotic cursted plaque c/w SCC      Surgical scar with no sign of skin cancer  recurrence      Open and closed comedones      Inflammatory papules and pustules      Verrucoid papule consistent consistent with wart     Erythematous eczematous patches and plaques     Dystrophic onycholytic nail with subungual debris c/w onychomycosis     Umbilicated papule    Erythematous-base heme-crusted tan verrucoid plaque consistent with inflamed seborrheic keratosis     Erythematous Silvery Scaling Plaque c/w Psoriasis     See annotation      Assessment / Plan:        Seborrheic keratosis  These are benign inherited growths without a malignant potential. Reassurance given to patient. No treatment is necessary.       Angioma  This is a benign vascular lesion. Reassurance given. No treatment required.       Lentigo  This is a benign hyperpigmented sun induced lesion. Daily sun protection will reduce the number of new lesions. Treatment of these benign lesions are considered cosmetic.      Screening exam for skin cancer  Upper body skin examination performed today including at least 6 points as noted in physical examination. No lesions suspicious for malignancy noted.               Follow-up if symptoms worsen or fail to improve.

## 2019-03-29 PROBLEM — B00.52 HSV (HERPES SIMPLEX VIRUS) DENDRITIC KERATITIS: Status: ACTIVE | Noted: 2019-03-29

## 2019-04-24 ENCOUNTER — PATIENT MESSAGE (OUTPATIENT)
Dept: HEMATOLOGY/ONCOLOGY | Facility: CLINIC | Age: 69
End: 2019-04-24

## 2019-04-24 DIAGNOSIS — N40.1 BENIGN PROSTATIC HYPERPLASIA WITH NOCTURIA: ICD-10-CM

## 2019-04-24 DIAGNOSIS — Z12.5 PROSTATE CANCER SCREENING: Primary | ICD-10-CM

## 2019-04-24 DIAGNOSIS — R35.1 BENIGN PROSTATIC HYPERPLASIA WITH NOCTURIA: ICD-10-CM

## 2019-05-10 ENCOUNTER — OFFICE VISIT (OUTPATIENT)
Dept: OPHTHALMOLOGY | Facility: CLINIC | Age: 69
End: 2019-05-10
Payer: COMMERCIAL

## 2019-05-10 DIAGNOSIS — H43.21 ASTEROID HYALOSIS OF RIGHT EYE: ICD-10-CM

## 2019-05-10 DIAGNOSIS — H25.13 NUCLEAR SCLEROTIC CATARACT OF BOTH EYES: Primary | ICD-10-CM

## 2019-05-10 DIAGNOSIS — H04.123 INSUFFICIENCY OF TEAR FILM OF BOTH EYES: ICD-10-CM

## 2019-05-10 PROCEDURE — 99999 PR PBB SHADOW E&M-EST. PATIENT-LVL II: ICD-10-PCS | Mod: PBBFAC,,, | Performed by: OPHTHALMOLOGY

## 2019-05-10 PROCEDURE — 99999 PR PBB SHADOW E&M-EST. PATIENT-LVL II: CPT | Mod: PBBFAC,,, | Performed by: OPHTHALMOLOGY

## 2019-05-10 PROCEDURE — 92014 COMPRE OPH EXAM EST PT 1/>: CPT | Mod: S$GLB,,, | Performed by: OPHTHALMOLOGY

## 2019-05-10 PROCEDURE — 92014 PR EYE EXAM, EST PATIENT,COMPREHESV: ICD-10-PCS | Mod: S$GLB,,, | Performed by: OPHTHALMOLOGY

## 2019-05-10 NOTE — PROGRESS NOTES
HPI     Concerns About Ocular Health      Additional comments: routine chk              Comments     Patient states he still sees a flash in OU from time to time but nothing   new or bad. He does see floaters in OD va as well.      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 (History)            Assessment /Plan     For exam results, see Encounter Report.    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  Also discussed options utilizing medications.    RD precautions:  Discussed with patient symptoms of RD with increased flashes, floaters, decreasing vision.  Patient/Family to call and return immediately to clinic should the symptoms of RD occur.  patient voice good understanding.    NSC OU  Observe    AH OD  Observe    Hx HSV OD  Dr Vasquez        Plan  RTC 1 year IOP, DFE and MRx  RTC sooner prn with good understanding

## 2019-06-18 ENCOUNTER — LAB VISIT (OUTPATIENT)
Dept: LAB | Facility: HOSPITAL | Age: 69
End: 2019-06-18
Attending: INTERNAL MEDICINE
Payer: COMMERCIAL

## 2019-06-18 DIAGNOSIS — R35.1 BENIGN PROSTATIC HYPERPLASIA WITH NOCTURIA: ICD-10-CM

## 2019-06-18 DIAGNOSIS — N40.1 BENIGN PROSTATIC HYPERPLASIA WITH NOCTURIA: ICD-10-CM

## 2019-06-18 DIAGNOSIS — Z12.5 PROSTATE CANCER SCREENING: ICD-10-CM

## 2019-06-18 LAB
ALBUMIN SERPL BCP-MCNC: 3.7 G/DL (ref 3.5–5.2)
ALP SERPL-CCNC: 77 U/L (ref 55–135)
ALT SERPL W/O P-5'-P-CCNC: 28 U/L (ref 10–44)
ANION GAP SERPL CALC-SCNC: 9 MMOL/L (ref 8–16)
AST SERPL-CCNC: 29 U/L (ref 10–40)
BILIRUB SERPL-MCNC: 2.1 MG/DL (ref 0.1–1)
BUN SERPL-MCNC: 9 MG/DL (ref 8–23)
CALCIUM SERPL-MCNC: 9.6 MG/DL (ref 8.7–10.5)
CHLORIDE SERPL-SCNC: 103 MMOL/L (ref 95–110)
CHOLEST SERPL-MCNC: 189 MG/DL (ref 120–199)
CHOLEST/HDLC SERPL: 2.3 {RATIO} (ref 2–5)
CO2 SERPL-SCNC: 29 MMOL/L (ref 23–29)
COMPLEXED PSA SERPL-MCNC: 0.79 NG/ML (ref 0–4)
CREAT SERPL-MCNC: 0.9 MG/DL (ref 0.5–1.4)
ERYTHROCYTE [DISTWIDTH] IN BLOOD BY AUTOMATED COUNT: 12.9 % (ref 11.5–14.5)
EST. GFR  (AFRICAN AMERICAN): >60 ML/MIN/1.73 M^2
EST. GFR  (NON AFRICAN AMERICAN): >60 ML/MIN/1.73 M^2
GLUCOSE SERPL-MCNC: 99 MG/DL (ref 70–110)
HCT VFR BLD AUTO: 46 % (ref 40–54)
HDLC SERPL-MCNC: 81 MG/DL (ref 40–75)
HDLC SERPL: 42.9 % (ref 20–50)
HGB BLD-MCNC: 15.5 G/DL (ref 14–18)
IMM GRANULOCYTES # BLD AUTO: 0.01 K/UL (ref 0–0.04)
LDLC SERPL CALC-MCNC: 89.2 MG/DL (ref 63–159)
MCH RBC QN AUTO: 33.2 PG (ref 27–31)
MCHC RBC AUTO-ENTMCNC: 33.7 G/DL (ref 32–36)
MCV RBC AUTO: 99 FL (ref 82–98)
NEUTROPHILS # BLD AUTO: 2 K/UL (ref 1.8–7.7)
NONHDLC SERPL-MCNC: 108 MG/DL
PLATELET # BLD AUTO: 204 K/UL (ref 150–350)
PMV BLD AUTO: 9.9 FL (ref 9.2–12.9)
POTASSIUM SERPL-SCNC: 4.2 MMOL/L (ref 3.5–5.1)
PROT SERPL-MCNC: 6.4 G/DL (ref 6–8.4)
RBC # BLD AUTO: 4.67 M/UL (ref 4.6–6.2)
SODIUM SERPL-SCNC: 141 MMOL/L (ref 136–145)
TRIGL SERPL-MCNC: 94 MG/DL (ref 30–150)
TSH SERPL DL<=0.005 MIU/L-ACNC: 1.49 UIU/ML (ref 0.4–4)
WBC # BLD AUTO: 4.64 K/UL (ref 3.9–12.7)

## 2019-06-18 PROCEDURE — 84153 ASSAY OF PSA TOTAL: CPT

## 2019-06-18 PROCEDURE — 80061 LIPID PANEL: CPT

## 2019-06-18 PROCEDURE — 80053 COMPREHEN METABOLIC PANEL: CPT

## 2019-06-18 PROCEDURE — 36415 COLL VENOUS BLD VENIPUNCTURE: CPT

## 2019-06-18 PROCEDURE — 84443 ASSAY THYROID STIM HORMONE: CPT

## 2019-06-18 PROCEDURE — 85027 COMPLETE CBC AUTOMATED: CPT

## 2019-06-24 NOTE — PROGRESS NOTES
Subjective:       Patient ID: Jacques Munoz is a 69 y.o. male.    Chief Complaint: No chief complaint on file.    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    Overall, he continues to do well.  He is exercising and has been watching his diet and controlling his weight.  He is due for follow-up in Cardiology.  Last colonoscopy was 2011  Review of Systems   Constitutional: Negative for appetite change and unexpected weight change.   Eyes: Negative for visual disturbance.   Respiratory: Negative for cough and shortness of breath.    Cardiovascular: Negative for chest pain.   Gastrointestinal: Negative for abdominal pain and diarrhea.   Genitourinary: Negative for frequency.   Musculoskeletal: Negative for back pain.   Skin: Negative for rash.   Neurological: Negative for headaches.   Hematological: Negative for adenopathy.   Psychiatric/Behavioral: The patient is not nervous/anxious.        Objective:      Physical Exam   Constitutional: He is oriented to person, place, and time. He appears well-developed and well-nourished.   HENT:   Head: Normocephalic.   Mouth/Throat: No oropharyngeal exudate.   Eyes: No scleral icterus.   Neck: No JVD present.   Cardiovascular: Normal rate and regular rhythm.   Pulmonary/Chest: Effort normal and breath sounds normal.   Abdominal: Soft. Bowel sounds are normal. Hernia confirmed negative in the right inguinal area and confirmed negative in the left inguinal area.   Genitourinary: Prostate normal. Rectal exam shows guaiac negative stool. Prostate is not enlarged and not tender. Right testis shows no mass. Left testis shows no mass.   Lymphadenopathy:     He has no cervical adenopathy.   Neurological: He is alert and oriented to person, place, and time.   Psychiatric: He has a normal mood and affect. His behavior is normal. Thought  content normal.   Vitals reviewed.      Assessment:       1. Pure hypercholesterolemia    2. Essential hypertension        Plan:       Return to clinic in 1 year with labs.       Distress Screening Results: Psychosocial Distress screening score of Distress Score: 0 noted and reviewed. No intervention indicated.

## 2019-06-25 ENCOUNTER — OFFICE VISIT (OUTPATIENT)
Dept: HEMATOLOGY/ONCOLOGY | Facility: CLINIC | Age: 69
End: 2019-06-25
Payer: COMMERCIAL

## 2019-06-25 VITALS
HEIGHT: 73 IN | SYSTOLIC BLOOD PRESSURE: 157 MMHG | OXYGEN SATURATION: 99 % | WEIGHT: 192.88 LBS | TEMPERATURE: 98 F | BODY MASS INDEX: 25.56 KG/M2 | RESPIRATION RATE: 20 BRPM | DIASTOLIC BLOOD PRESSURE: 93 MMHG | HEART RATE: 80 BPM

## 2019-06-25 DIAGNOSIS — I10 ESSENTIAL HYPERTENSION: ICD-10-CM

## 2019-06-25 DIAGNOSIS — I10 ESSENTIAL HYPERTENSION: Primary | ICD-10-CM

## 2019-06-25 DIAGNOSIS — E78.00 PURE HYPERCHOLESTEROLEMIA: Primary | ICD-10-CM

## 2019-06-25 DIAGNOSIS — I77.9 AORTIC DISEASE: ICD-10-CM

## 2019-06-25 DIAGNOSIS — I71.20 THORACIC AORTIC ANEURYSM WITHOUT RUPTURE: ICD-10-CM

## 2019-06-25 PROCEDURE — 99213 OFFICE O/P EST LOW 20 MIN: CPT | Mod: S$GLB,,, | Performed by: INTERNAL MEDICINE

## 2019-06-25 PROCEDURE — 1101F PT FALLS ASSESS-DOCD LE1/YR: CPT | Mod: CPTII,S$GLB,, | Performed by: INTERNAL MEDICINE

## 2019-06-25 PROCEDURE — 3080F PR MOST RECENT DIASTOLIC BLOOD PRESSURE >= 90 MM HG: ICD-10-PCS | Mod: CPTII,S$GLB,, | Performed by: INTERNAL MEDICINE

## 2019-06-25 PROCEDURE — 3077F SYST BP >= 140 MM HG: CPT | Mod: CPTII,S$GLB,, | Performed by: INTERNAL MEDICINE

## 2019-06-25 PROCEDURE — 99213 PR OFFICE/OUTPT VISIT, EST, LEVL III, 20-29 MIN: ICD-10-PCS | Mod: S$GLB,,, | Performed by: INTERNAL MEDICINE

## 2019-06-25 PROCEDURE — 3080F DIAST BP >= 90 MM HG: CPT | Mod: CPTII,S$GLB,, | Performed by: INTERNAL MEDICINE

## 2019-06-25 PROCEDURE — 3077F PR MOST RECENT SYSTOLIC BLOOD PRESSURE >= 140 MM HG: ICD-10-PCS | Mod: CPTII,S$GLB,, | Performed by: INTERNAL MEDICINE

## 2019-06-25 PROCEDURE — 99999 PR PBB SHADOW E&M-EST. PATIENT-LVL III: ICD-10-PCS | Mod: PBBFAC,,, | Performed by: INTERNAL MEDICINE

## 2019-06-25 PROCEDURE — 1101F PR PT FALLS ASSESS DOC 0-1 FALLS W/OUT INJ PAST YR: ICD-10-PCS | Mod: CPTII,S$GLB,, | Performed by: INTERNAL MEDICINE

## 2019-06-25 PROCEDURE — 99999 PR PBB SHADOW E&M-EST. PATIENT-LVL III: CPT | Mod: PBBFAC,,, | Performed by: INTERNAL MEDICINE

## 2019-06-26 ENCOUNTER — PATIENT MESSAGE (OUTPATIENT)
Dept: CARDIOLOGY | Facility: CLINIC | Age: 69
End: 2019-06-26

## 2019-07-09 ENCOUNTER — HOSPITAL ENCOUNTER (OUTPATIENT)
Dept: CARDIOLOGY | Facility: CLINIC | Age: 69
Discharge: HOME | End: 2019-07-09
Payer: COMMERCIAL

## 2019-07-09 ENCOUNTER — HOSPITAL ENCOUNTER (OUTPATIENT)
Dept: RADIOLOGY | Facility: HOSPITAL | Age: 69
Discharge: HOME | End: 2019-07-09
Attending: INTERNAL MEDICINE
Payer: COMMERCIAL

## 2019-07-09 DIAGNOSIS — I71.20 THORACIC AORTIC ANEURYSM WITHOUT RUPTURE: ICD-10-CM

## 2019-07-09 DIAGNOSIS — I10 ESSENTIAL HYPERTENSION: ICD-10-CM

## 2019-07-09 DIAGNOSIS — I77.9 AORTIC DISEASE: ICD-10-CM

## 2019-07-09 PROCEDURE — 71275 CTA CHEST NON CORONARY: ICD-10-PCS | Mod: 26,,, | Performed by: RADIOLOGY

## 2019-07-09 PROCEDURE — 71275 CT ANGIOGRAPHY CHEST: CPT | Mod: 26,,, | Performed by: RADIOLOGY

## 2019-07-09 PROCEDURE — 71275 CT ANGIOGRAPHY CHEST: CPT | Mod: TC

## 2019-07-09 PROCEDURE — 25500020 PHARM REV CODE 255: Performed by: INTERNAL MEDICINE

## 2019-07-09 PROCEDURE — 93000 ELECTROCARDIOGRAM COMPLETE: CPT | Mod: S$GLB,,, | Performed by: INTERNAL MEDICINE

## 2019-07-09 PROCEDURE — 93000 EKG 12-LEAD: ICD-10-PCS | Mod: S$GLB,,, | Performed by: INTERNAL MEDICINE

## 2019-07-09 RX ADMIN — IOHEXOL 100 ML: 350 INJECTION, SOLUTION INTRAVENOUS at 08:07

## 2019-08-28 ENCOUNTER — CLINICAL SUPPORT (OUTPATIENT)
Dept: INFECTIOUS DISEASES | Facility: CLINIC | Age: 69
End: 2019-08-28
Payer: COMMERCIAL

## 2019-08-28 ENCOUNTER — TELEPHONE (OUTPATIENT)
Dept: INFECTIOUS DISEASES | Facility: HOSPITAL | Age: 69
End: 2019-08-28

## 2019-08-28 PROCEDURE — 90686 IIV4 VACC NO PRSV 0.5 ML IM: CPT | Mod: S$GLB,,, | Performed by: INTERNAL MEDICINE

## 2019-08-28 PROCEDURE — 90471 FLU VACCINE (QUAD) GREATER THAN OR EQUAL TO 3YO PRESERVATIVE FREE IM: ICD-10-PCS | Mod: S$GLB,,, | Performed by: INTERNAL MEDICINE

## 2019-08-28 PROCEDURE — 90686 FLU VACCINE (QUAD) GREATER THAN OR EQUAL TO 3YO PRESERVATIVE FREE IM: ICD-10-PCS | Mod: S$GLB,,, | Performed by: INTERNAL MEDICINE

## 2019-08-28 PROCEDURE — 90471 IMMUNIZATION ADMIN: CPT | Mod: S$GLB,,, | Performed by: INTERNAL MEDICINE

## 2019-10-06 ENCOUNTER — TELEPHONE (OUTPATIENT)
Dept: INFECTIOUS DISEASES | Facility: HOSPITAL | Age: 69
End: 2019-10-06

## 2019-10-06 NOTE — TELEPHONE ENCOUNTER
Can you send documentation to [facility] in  for his flu vaccine. He needs sticker from  also so need to see how we can get that.

## 2019-11-12 ENCOUNTER — HOSPITAL ENCOUNTER (OUTPATIENT)
Dept: RADIOLOGY | Facility: HOSPITAL | Age: 69
Discharge: HOME | End: 2019-11-12
Attending: ORTHOPAEDIC SURGERY
Payer: COMMERCIAL

## 2019-11-12 ENCOUNTER — OFFICE VISIT (OUTPATIENT)
Dept: SPORTS MEDICINE | Facility: CLINIC | Age: 69
End: 2019-11-12
Payer: COMMERCIAL

## 2019-11-12 VITALS
BODY MASS INDEX: 25.45 KG/M2 | HEIGHT: 73 IN | SYSTOLIC BLOOD PRESSURE: 147 MMHG | HEART RATE: 65 BPM | WEIGHT: 192 LBS | DIASTOLIC BLOOD PRESSURE: 89 MMHG

## 2019-11-12 DIAGNOSIS — M25.562 LEFT KNEE PAIN, UNSPECIFIED CHRONICITY: ICD-10-CM

## 2019-11-12 DIAGNOSIS — M25.562 LEFT KNEE PAIN, UNSPECIFIED CHRONICITY: Primary | ICD-10-CM

## 2019-11-12 PROCEDURE — 1101F PR PT FALLS ASSESS DOC 0-1 FALLS W/OUT INJ PAST YR: ICD-10-PCS | Mod: CPTII,S$GLB,, | Performed by: ORTHOPAEDIC SURGERY

## 2019-11-12 PROCEDURE — 99203 OFFICE O/P NEW LOW 30 MIN: CPT | Mod: S$GLB,,, | Performed by: ORTHOPAEDIC SURGERY

## 2019-11-12 PROCEDURE — 3079F PR MOST RECENT DIASTOLIC BLOOD PRESSURE 80-89 MM HG: ICD-10-PCS | Mod: CPTII,S$GLB,, | Performed by: ORTHOPAEDIC SURGERY

## 2019-11-12 PROCEDURE — 3077F PR MOST RECENT SYSTOLIC BLOOD PRESSURE >= 140 MM HG: ICD-10-PCS | Mod: CPTII,S$GLB,, | Performed by: ORTHOPAEDIC SURGERY

## 2019-11-12 PROCEDURE — 73564 X-RAY EXAM KNEE 4 OR MORE: CPT | Mod: 26,50,, | Performed by: RADIOLOGY

## 2019-11-12 PROCEDURE — 99999 PR PBB SHADOW E&M-EST. PATIENT-LVL III: CPT | Mod: PBBFAC,,, | Performed by: ORTHOPAEDIC SURGERY

## 2019-11-12 PROCEDURE — 3077F SYST BP >= 140 MM HG: CPT | Mod: CPTII,S$GLB,, | Performed by: ORTHOPAEDIC SURGERY

## 2019-11-12 PROCEDURE — 99999 PR PBB SHADOW E&M-EST. PATIENT-LVL III: ICD-10-PCS | Mod: PBBFAC,,, | Performed by: ORTHOPAEDIC SURGERY

## 2019-11-12 PROCEDURE — 73564 X-RAY EXAM KNEE 4 OR MORE: CPT | Mod: TC,50

## 2019-11-12 PROCEDURE — 73564 XR KNEE ORTHO BILAT WITH FLEXION: ICD-10-PCS | Mod: 26,50,, | Performed by: RADIOLOGY

## 2019-11-12 PROCEDURE — 1101F PT FALLS ASSESS-DOCD LE1/YR: CPT | Mod: CPTII,S$GLB,, | Performed by: ORTHOPAEDIC SURGERY

## 2019-11-12 PROCEDURE — 3079F DIAST BP 80-89 MM HG: CPT | Mod: CPTII,S$GLB,, | Performed by: ORTHOPAEDIC SURGERY

## 2019-11-12 PROCEDURE — 99203 PR OFFICE/OUTPT VISIT, NEW, LEVL III, 30-44 MIN: ICD-10-PCS | Mod: S$GLB,,, | Performed by: ORTHOPAEDIC SURGERY

## 2019-11-12 NOTE — PROGRESS NOTES
CC: Left knee pain    69 y.o. Male with a history of left knee pain for 6-7 years.  He is reporting mechanical locking and catching.  Clicking sensation when walking.      He reports that the pain and weakness. It also bothers him at night.    + mechanical symptoms, - instability    Is affecting ADLs.  Pain is 3/10 at it's worst.    REVIEW OF SYSTEMS:  Constitution: Negative. Negative for chills, fever and night sweats.   HENT: Negative for congestion and headaches.    Eyes: Negative for blurred vision, left vision loss and right vision loss.   Cardiovascular: Negative for chest pain and syncope.   Respiratory: Negative for cough and shortness of breath.    Endocrine: Negative for polydipsia, polyphagia and polyuria.   Hematologic/Lymphatic: Negative for bleeding problem. Does not bruise/bleed easily.   Skin: Negative for dry skin, itching and rash.   Musculoskeletal: Negative for falls. Positive for left knee pain and  muscle weakness.   Gastrointestinal: Negative for abdominal pain and bowel incontinence.   Genitourinary: Negative for bladder incontinence and nocturia.   Neurological: Negative for disturbances in coordination, loss of balance and seizures.   Psychiatric/Behavioral: Negative for depression. The patient does not have insomnia.    Allergic/Immunologic: Negative for hives and persistent infections.     PAST MEDICAL HISTORY:    Past Medical History:   Diagnosis Date    BPH (benign prostatic hyperplasia)     High cholesterol     Hypertension     Kidney stone        PAST SURGICAL HISTORY:   Past Surgical History:   Procedure Laterality Date    TONSILLECTOMY         FAMILY HISTORY:   Family History   Problem Relation Age of Onset    Hypertension Mother     Hypertension Father     Melanoma Neg Hx        SOCIAL HISTORY:   Social History     Socioeconomic History    Marital status:      Spouse name: Not on file    Number of children: Not on file    Years of education: Not on file     Highest education level: Not on file   Occupational History    Occupation: physician   Social Needs    Financial resource strain: Not on file    Food insecurity:     Worry: Not on file     Inability: Not on file    Transportation needs:     Medical: Not on file     Non-medical: Not on file   Tobacco Use    Smoking status: Never Smoker    Smokeless tobacco: Never Used   Substance and Sexual Activity    Alcohol use: Yes    Drug use: No    Sexual activity: Yes     Partners: Female   Lifestyle    Physical activity:     Days per week: Not on file     Minutes per session: Not on file    Stress: Not on file   Relationships    Social connections:     Talks on phone: Not on file     Gets together: Not on file     Attends Yazdanism service: Not on file     Active member of club or organization: Not on file     Attends meetings of clubs or organizations: Not on file     Relationship status: Not on file   Other Topics Concern    Not on file   Social History Narrative    Not on file       MEDICATIONS:     Current Outpatient Medications:     atorvastatin (LIPITOR) 20 MG tablet, TAKE ONE TABLET BY MOUTH EVERY DAY, Disp: 90 tablet, Rfl: 3    famotidine (PEPCID) 20 MG tablet, Take 20 mg by mouth once daily. , Disp: , Rfl:     fish oil-omega-3 fatty acids 300-1,000 mg capsule, Take 2 g by mouth once daily., Disp: , Rfl:     fluticasone (FLONASE) 50 mcg/actuation nasal spray, 1 spray by Each Nare route once daily., Disp: 1 Bottle, Rfl: prn    loratadine 10 mg Cap, Take by mouth., Disp: , Rfl:     metoprolol succinate (TOPROL-XL) 50 MG 24 hr tablet, Take 1 tablet (50 mg total) by mouth once daily., Disp: 90 tablet, Rfl: 3    tamsulosin (FLOMAX) 0.4 mg Cp24, Take 1 capsule (0.4 mg total) by mouth once daily., Disp: 90 capsule, Rfl: 3    typhoid (VIVOTIF) DR capsule, Take 1 capsule by mouth every other day., Disp: 4 capsule, Rfl: 0    valacyclovir (VALTREX) 500 MG tablet, Take 1 tablet (500 mg total) by mouth 3  "(three) times daily., Disp: 30 tablet, Rfl: 0    zolpidem (AMBIEN) 5 MG Tab, Take 1 tablet (5 mg total) by mouth nightly as needed., Disp: 30 tablet, Rfl: 4    ALLERGIES:   Review of patient's allergies indicates:  No Known Allergies    VITAL SIGNS:   BP (!) 147/89   Pulse 65   Ht 6' 1" (1.854 m)   Wt 87.1 kg (192 lb)   BMI 25.33 kg/m²      PHYSICAL EXAMINATION  General:  The patient is alert and oriented x 3.  Mood is pleasant.  Observation of ears, eyes and nose reveal no gross abnormalities.  No labored breathing observed.    LEFT KNEE EXAMINATION     OBSERVATION / INSPECTION   Gait:   Antalgic   Alignment:  Neutral   Scars:   None   Muscle atrophy: Mild  Effusion:  Mild  Warmth:  None   Discoloration:   none     TENDERNESS / CREPITUS (T / C):          T / C      T / C   Patella   - / -   Lateral joint line   + / -   Peripatellar medial  -  Medial joint line    - / -   Peripatellar lateral -  Medial plica   - / -   Patellar tendon -   Popliteal fossa   - / -   Quad tendon   -   Gastrocnemius   -   Prepatellar Bursa - / -   Quadricep   -   Tibial tubercle  -  Thigh/hamstring  -   Pes anserine/HS -  Fibula    -   ITB   - / -  Tibia     -   Tib/fib joint  - / -  LCL    -     MFC   - / -   MCL: Proximal  -    LFC   - / -    Distal   -          ROM: (* = pain)  PASSIVE   ACTIVE    Left :   5 / 0 / 135   5 / 0 / 135     Right :    5 / 0 / 135   5 / 0 / 135    Patellofemoral examination:  See above noted areas of tenderness.   Patella position    Subluxation / dislocation: Centered           Sup. / Inf;   Normal   Crepitus (PF):    Absent   Patellar Mobility:       Medial-lateral:   Normal    Superior-inferior:  Normal    Inferior tilt   Normal    Patellar tendon:  Normal   Lateral tilt:    Normal   J-sign:     None   Patellofemoral grind:   No pain       MENISCAL SIGNS:     Pain on terminal extension:  -  Pain on terminal flexion:  +  Lews maneuver:  + (for pain)  Squat     + (for pain)    LIGAMENT " EXAMINATION:  ACL / Lachman:  normal (-1 to 2mm)    PCL-Post.  drawer: normal 0 to 2mm  MCL- Valgus:  normal 0 to 2mm  LCL- Varus:  normal 0 to 2mm  Pivot shift: normal (Equal)   Dial Test: difference c/w other side   At 30° flexion: normal (< 5°)    At 90° flexion: normal (< 5°)   Reverse Pivot Shift:   normal (Equal)     STRENGTH: (* = with pain) PAINFUL SIDE   Quadricep   5/5   Hamstrin/5    EXTREMITY NEURO-VASCULAR EXAMINATION:   Sensation:  Grossly intact to light touch all dermatomal regions.   Motor Function:  Fully intact motor function at hip, knee, foot and ankle    DTRs;  quadriceps and  achilles 2+.  No clonus and downgoing Babinski.    Vascular status:  DP and PT pulses 2+, brisk capillary refill, symmetric.     XRAY (19):   Right: There is mild DJD.  No fracture dislocation bone destruction or OCD seen.    Left: No fracture dislocation bone destruction or OCD seen.  There is mild DJD.     ASSESSMENT:    Left knee pain, probable lateral meniscus tear    PLAN:   1. MRI Left knee evaluate for lateral meniscus tear  2. Follow up in clinic for MRI results

## 2019-11-18 ENCOUNTER — HOSPITAL ENCOUNTER (OUTPATIENT)
Dept: RADIOLOGY | Facility: OTHER | Age: 69
Discharge: HOME | End: 2019-11-18
Attending: ORTHOPAEDIC SURGERY
Payer: COMMERCIAL

## 2019-11-18 ENCOUNTER — PATIENT MESSAGE (OUTPATIENT)
Dept: SPORTS MEDICINE | Facility: CLINIC | Age: 69
End: 2019-11-18

## 2019-11-18 ENCOUNTER — TELEPHONE (OUTPATIENT)
Dept: SPORTS MEDICINE | Facility: CLINIC | Age: 69
End: 2019-11-18

## 2019-11-18 DIAGNOSIS — M25.562 CHRONIC PAIN OF LEFT KNEE: Primary | ICD-10-CM

## 2019-11-18 DIAGNOSIS — M25.562 LEFT KNEE PAIN, UNSPECIFIED CHRONICITY: ICD-10-CM

## 2019-11-18 DIAGNOSIS — S83.289A LATERAL MENISCUS TEAR: ICD-10-CM

## 2019-11-18 DIAGNOSIS — G89.29 CHRONIC PAIN OF LEFT KNEE: Primary | ICD-10-CM

## 2019-11-18 DIAGNOSIS — S83.242D ACUTE MEDIAL MENISCUS TEAR OF LEFT KNEE, SUBSEQUENT ENCOUNTER: Primary | ICD-10-CM

## 2019-11-18 DIAGNOSIS — Z01.818 PREOPERATIVE CLEARANCE: Primary | ICD-10-CM

## 2019-11-18 DIAGNOSIS — S83.282D OTHER TEAR OF LATERAL MENISCUS OF LEFT KNEE AS CURRENT INJURY, SUBSEQUENT ENCOUNTER: ICD-10-CM

## 2019-11-18 DIAGNOSIS — M94.262 CHONDROMALACIA, LEFT KNEE: ICD-10-CM

## 2019-11-18 DIAGNOSIS — S83.249A MEDIAL MENISCUS TEAR: ICD-10-CM

## 2019-11-18 PROCEDURE — 73721 MRI JNT OF LWR EXTRE W/O DYE: CPT | Mod: TC,LT

## 2019-11-18 PROCEDURE — 73721 MRI JNT OF LWR EXTRE W/O DYE: CPT | Mod: 26,LT,, | Performed by: RADIOLOGY

## 2019-11-18 PROCEDURE — 73721 MRI KNEE WITHOUT CONTRAST LEFT: ICD-10-PCS | Mod: 26,LT,, | Performed by: RADIOLOGY

## 2019-11-18 RX ORDER — ONDANSETRON 4 MG/1
4 TABLET, FILM COATED ORAL EVERY 8 HOURS PRN
Qty: 30 TABLET | Refills: 0 | Status: SHIPPED | OUTPATIENT
Start: 2019-11-18 | End: 2019-12-01

## 2019-11-18 RX ORDER — SODIUM CHLORIDE 9 MG/ML
INJECTION, SOLUTION INTRAVENOUS CONTINUOUS
Status: CANCELLED | OUTPATIENT
Start: 2019-11-18

## 2019-11-18 RX ORDER — ASPIRIN 325 MG
325 TABLET, DELAYED RELEASE (ENTERIC COATED) ORAL 2 TIMES DAILY
Qty: 42 TABLET | Refills: 0 | Status: SHIPPED | OUTPATIENT
Start: 2019-11-18 | End: 2020-08-13

## 2019-11-18 RX ORDER — HYDROCODONE BITARTRATE AND ACETAMINOPHEN 10; 325 MG/1; MG/1
1 TABLET ORAL EVERY 6 HOURS PRN
Qty: 28 TABLET | Refills: 0 | Status: SHIPPED | OUTPATIENT
Start: 2019-11-18 | End: 2019-11-28

## 2019-11-18 NOTE — TELEPHONE ENCOUNTER
Dr. Salas reached out to patient to discuss MRI results.  Plan for knee scope on Friday, 11/22/19 at [place].  Message sent to patient's PCP, Dr. Wilmer Ohara regarding medical clearance, EKG, and blood work requested.    XRAY Radiology Impression (11/12/19):  Right: There is mild DJD.  No fracture dislocation bone destruction or OCD seen.  Left: No fracture dislocation bone destruction or OCD seen.  There is mild DJD.    MRI Radiology Impression (11/18/19):  1. Complex tear body segment/posterior horn lateral meniscus with predominant horizontal component that extends into the anterior horn.  2. Complex tear body segment/posterior horn medial meniscus with predominant horizontal component.  Anterior horn demonstrates mucoid degeneration and degenerative free edge fraying.  3. Mild patellar and lateral tibiofemoral chondromalacia.  4. Moderate complex popliteal cyst.  5. Small distal femoral enchondroma.    IMPRESSION: Chronic left knee pain, medial and lateral meniscus tear, chondromalacia    PLAN:  Treatment options were discussed with the patient about his knee.  I reviewed the MRI images with his, including the relevant anatomy, and what this means for his knee.    We discussed both non-operative and operative options for his knee and the risks and benefits of each.    I feel like he would benefit from surgery for his knee.  After a discussion of specific risks and benefits, he would like to proceed with setting this up.    These risks include: bleeding, infection, scarring, damage to neurovascular structures, damage to cartilage, stiffness, blood clots, pulmonary embolism, swelling, compartment syndrome, need for further surgery, and the risks of anesthesia.      He verbalized his understanding of these risks and wished to proceed with surgery.    A total of 25 minutes were spent face-to-face with the patient during this encounter and over half of that time was spent on counseling about treatment options  including his choice for surgery and coordination of his care for his preoperative visits, surgery and post-operative rehab.  We also had a detailed discussion of his expectation level for this procedure as well as any limitations at home or work and with exercising following surgery.     The operative plan is:    left   1. Arthroscopic partial medial and lateral meniscectomy  2. Possible arthroscopic synovectomy  3. Possible arthroscopic loose body removal  4. Possible arthroscopic chondroplasty    Position: Supine    Patient will (Dr. Wilmer Ohara) need medical clearance prior to the pre-operative appointment.    If he wishes to proceed, we'll get his scheduled for this at his convenience around his work schedule.

## 2019-11-18 NOTE — H&P
Jacques Munoz  is here for a completion of his perioperative paperwork. he  Is scheduled to undergo left   1. Arthroscopic partial medial and lateral meniscectomy  2. Possible arthroscopic synovectomy  3. Possible arthroscopic loose body removal  4. Possible arthroscopic chondroplasty on 11/22/19.  He is a healthy individual and does need clearance for this procedure.     Pending clearance by Dr. Wilmer Ohara.     Risks, indications and benefits of the surgical procedure were discussed with the patient. All questions with regard to surgery, rehab, expected return to functional activities, activities of daily living and recreational endeavors were answered to his satisfaction.    Patient was informed and understands the risks of surgery are greater for patients with a current condition or history of heart disease, obesity, clotting disorders, recurrent infections, steroid use, current or past smoking, and factors such as sedentary lifestyle and noncompliance with medications, therapy or follow-up. The degree of the increased risk is hard to estimate with any degree of precision.    Once no other questions were asked, a brief history and physical exam was then performed.    PAST MEDICAL HISTORY:   Past Medical History:   Diagnosis Date    BPH (benign prostatic hyperplasia)     High cholesterol     Hypertension     Kidney stone      PAST SURGICAL HISTORY:   Past Surgical History:   Procedure Laterality Date    TONSILLECTOMY       FAMILY HISTORY:   Family History   Problem Relation Age of Onset    Hypertension Mother     Hypertension Father     Melanoma Neg Hx      SOCIAL HISTORY:   Social History     Socioeconomic History    Marital status:      Spouse name: Not on file    Number of children: Not on file    Years of education: Not on file    Highest education level: Not on file   Occupational History    Occupation: physician   Social Needs    Financial resource strain: Not on file    Food  insecurity:     Worry: Not on file     Inability: Not on file    Transportation needs:     Medical: Not on file     Non-medical: Not on file   Tobacco Use    Smoking status: Never Smoker    Smokeless tobacco: Never Used   Substance and Sexual Activity    Alcohol use: Yes    Drug use: No    Sexual activity: Yes     Partners: Female   Lifestyle    Physical activity:     Days per week: Not on file     Minutes per session: Not on file    Stress: Not on file   Relationships    Social connections:     Talks on phone: Not on file     Gets together: Not on file     Attends [employer] service: Not on file     Active member of club or organization: Not on file     Attends meetings of clubs or organizations: Not on file     Relationship status: Not on file   Other Topics Concern    Not on file   Social History Narrative    Not on file       MEDICATIONS:   Current Outpatient Medications:     atorvastatin (LIPITOR) 20 MG tablet, TAKE ONE TABLET BY MOUTH EVERY DAY, Disp: 90 tablet, Rfl: 3    famotidine (PEPCID) 20 MG tablet, Take 20 mg by mouth once daily. , Disp: , Rfl:     fish oil-omega-3 fatty acids 300-1,000 mg capsule, Take 2 g by mouth once daily., Disp: , Rfl:     fluticasone (FLONASE) 50 mcg/actuation nasal spray, 1 spray by Each Nare route once daily., Disp: 1 Bottle, Rfl: prn    loratadine 10 mg Cap, Take by mouth., Disp: , Rfl:     metoprolol succinate (TOPROL-XL) 50 MG 24 hr tablet, Take 1 tablet (50 mg total) by mouth once daily., Disp: 90 tablet, Rfl: 3    tamsulosin (FLOMAX) 0.4 mg Cp24, Take 1 capsule (0.4 mg total) by mouth once daily., Disp: 90 capsule, Rfl: 3    typhoid (VIVOTIF) DR capsule, Take 1 capsule by mouth every other day., Disp: 4 capsule, Rfl: 0    valacyclovir (VALTREX) 500 MG tablet, Take 1 tablet (500 mg total) by mouth 3 (three) times daily., Disp: 30 tablet, Rfl: 0    zolpidem (AMBIEN) 5 MG Tab, Take 1 tablet (5 mg total) by mouth nightly as needed., Disp: 30 tablet, Rfl:  4  ALLERGIES: Review of patient's allergies indicates:  No Known Allergies    Review of Systems   Constitution: Negative. Negative for chills, fever and night sweats.   HENT: Negative for congestion and headaches.    Eyes: Negative for blurred vision, left vision loss and right vision loss.   Cardiovascular: Negative for chest pain and syncope.   Respiratory: Negative for cough and shortness of breath.    Endocrine: Negative for polydipsia, polyphagia and polyuria.   Hematologic/Lymphatic: Negative for bleeding problem. Does not bruise/bleed easily.   Skin: Negative for dry skin, itching and rash.   Musculoskeletal: Negative for falls and muscle weakness.   Gastrointestinal: Negative for abdominal pain and bowel incontinence.   Genitourinary: Negative for bladder incontinence and nocturia.   Neurological: Negative for disturbances in coordination, loss of balance and seizures.   Psychiatric/Behavioral: Negative for depression. The patient does not have insomnia.    Allergic/Immunologic: Negative for hives and persistent infections.     PHYSICAL EXAM:  GEN: A&Ox3, WD WN NAD  HEENT: WNL  CHEST: CTAB, no W/R/R  HEART: RRR, no M/R/G   ABD: Soft, NT ND, BS x4 QUADS  MS: Refer to previous note for detailed MS exam  NEURO: CN II-XII intact       The surgical consent was then reviewed with the patient, who agreed with all the contents of the consent form and it was signed. he was then given the [facility] surgery packet to bring with him to [facility] for the anesthesia portion of his perioperative paperwork.     PHYSICAL THERAPY:  He was also instructed regarding physical therapy and will begin POD # 1-3 at [facility].    POST OP CARE:instructions were reviewed including care of the wound and dressing after surgery and when he can shower.     PAIN MANAGEMENT: Jacques Munoz was also given a pain management regime, which includes the TENS unit which he did not receive along with the education required for its use. He was  also instructed regarding the Polar ice unit that will be in place after surgery and his postoperative pain medications.     PAIN MEDICATION:  Norco 10/325mg 1 po q 4-6 hours prn pain  Zofran 4 mg one p.o. q 8 hours p.r.n. nausea and vomiting.  ASA 325mg BID x 3 weeks post op    TO BE DELIVERED BEDSIDE AT [facility]    Patient denies history of seizures.     Patient was instructed to buy and take:  Aspirin 325mg BID x 3 weeks for DVT prophylaxis starting on the evening after surgery.  Patient will also use bilateral TEDs on lower extremities, SCDs during surgery, and early ambulation post-op. If the patient was previously taking 81mg baby aspirin, they were told to not take it will using the above stated aspirin and to restart the 81mg aspirin after completion of the aspirin dose.       Patient was also told to buy over the counter Prilosec medication and take it once daily for GI protection as long as they are taking NSAIDs or Aspirin.    DVT prophylaxis was discussed with the patient today including risk factors for developing DVTs and history of DVTs. The patient was asked if any specific recommendations were given from the doctor/s that did pre-operative surgical clearance.        The patient was told that narcotic pain medications may make them drowsy and instructions were given to not sign legal documents, drive or operate heavy machinery, cars, or equipment while under the influence of narcotic medications.     As there were no other questions to be asked, he was given my business card along with Bairon Salas MD business card if he has any questions or concerns prior to surgery or in the postop period.

## 2019-11-19 ENCOUNTER — DOCUMENTATION ONLY (OUTPATIENT)
Dept: HEMATOLOGY/ONCOLOGY | Facility: CLINIC | Age: 69
End: 2019-11-19

## 2019-11-19 ENCOUNTER — ANESTHESIA EVENT (OUTPATIENT)
Dept: SURGERY | Facility: HOSPITAL | Age: 69
End: 2019-11-19
Payer: COMMERCIAL

## 2019-11-19 ENCOUNTER — HOSPITAL ENCOUNTER (OUTPATIENT)
Dept: CARDIOLOGY | Facility: CLINIC | Age: 69
Discharge: HOME | End: 2019-11-19
Payer: COMMERCIAL

## 2019-11-19 DIAGNOSIS — Z01.818 PREOPERATIVE CLEARANCE: ICD-10-CM

## 2019-11-19 PROCEDURE — 93005 EKG 12-LEAD: ICD-10-PCS | Mod: S$GLB,,, | Performed by: INTERNAL MEDICINE

## 2019-11-19 PROCEDURE — 93005 ELECTROCARDIOGRAM TRACING: CPT | Mod: S$GLB,,, | Performed by: INTERNAL MEDICINE

## 2019-11-19 PROCEDURE — 93010 ELECTROCARDIOGRAM REPORT: CPT | Mod: S$GLB,,, | Performed by: INTERNAL MEDICINE

## 2019-11-19 PROCEDURE — 93010 EKG 12-LEAD: ICD-10-PCS | Mod: S$GLB,,, | Performed by: INTERNAL MEDICINE

## 2019-11-19 NOTE — PROGRESS NOTES
Preparing to have shoulder surgery.    Labs and EKG are unremarkable.    Medically cleared for proposed surgery.

## 2019-11-19 NOTE — ANESTHESIA PREPROCEDURE EVALUATION
11/19/2019  Jacques Munoz is a 69 y.o., male.    Anesthesia Evaluation    I have reviewed the Patient Summary Reports.    I have reviewed the Nursing Notes.   I have reviewed the Medications.     Review of Systems  Anesthesia Hx:  No problems with previous Anesthesia  History of prior surgery of interest to airway management or planning: Previous anesthesia: General Denies Family Hx of Anesthesia complications.   Denies Personal Hx of Anesthesia complications.   Social:  Non-Smoker, No Alcohol Use    Hematology/Oncology:  Hematology Normal   Oncology Normal     EENT/Dental:EENT/Dental Normal   Cardiovascular:   Exercise tolerance: good Hypertension hyperlipidemia Mild dilation of aorta   Functional Capacity good / => 4 METS    Pulmonary:  Pulmonary Normal    Renal/:   renal calculi BPH    Hepatic/GI:  Hepatic/GI Normal    Musculoskeletal:  Musculoskeletal Normal    Neurological:  Neurology Normal    Endocrine:  Endocrine Normal    Dermatological:  Skin Normal    Psych:  Psychiatric Normal           Physical Exam  General:  Well nourished    Airway/Jaw/Neck:  Airway Findings: Mouth Opening: Normal Tongue: Normal  General Airway Assessment: Adult, Average  Mallampati: II  Improves to II with phonation.  TM Distance: 4 - 6 cm        Eyes/Ears/Nose:  EYES/EARS/NOSE FINDINGS: Normal   Dental:  Dental Findings: In tact, Upper front caps   Chest/Lungs:  Chest/Lungs Findings: Clear to auscultation, Normal Respiratory Rate     Heart/Vascular:  Heart Findings: Rate: Normal  Rhythm: Regular Rhythm  Sounds: Normal  Heart murmur: negative Vascular Findings: Normal    Abdomen:  Abdomen Findings: Normal    Musculoskeletal:  Musculoskeletal Findings: Normal   Skin:  Skin Findings: Normal    Mental Status:  Mental Status Findings:  Cooperative, Alert and Oriented         Anesthesia Plan  Type of Anesthesia, risks &  benefits discussed:  Anesthesia Type:  general  Patient's Preference:   Intra-op Monitoring Plan: standard ASA monitors  Intra-op Monitoring Plan Comments:   Post Op Pain Control Plan:   Post Op Pain Control Plan Comments:   Induction:   IV  Beta Blocker:  Patient is on a Beta-Blocker and has received one dose within the past 24 hours (No further documentation required).       Informed Consent: Patient understands risks and agrees with Anesthesia plan.  Questions answered. Anesthesia consent signed with patient.  ASA Score: 2     Day of Surgery Review of History & Physical:            Ready For Surgery From Anesthesia Perspective.

## 2019-11-19 NOTE — PLAN OF CARE
Patient instructions given per surgical and medical guidelines.  NPO status reviewed with patient.  Patient verbalized understanding of both food (8 hours)and fluid intake (2hours)prior to surgery.  Reinforced ONLY CLEAR liquids to include water, apple juice, clear gatorade and such.  (No milk). Antibacterial scrub instructions given, per MD recommendations.  All questions answered.  Driving directions given for day of surgery. Voiced understanding that surgery will take place at the [facility] on [address] [place].  Anticoagulants, OTC's and vitamins, and prescription medication instructions reviewed per protocol.  Nurse reviewed history as stated in chart.  Message sent to patient portal if available.

## 2019-11-19 NOTE — PRE ADMISSION SCREENING
Anesthesia Assessment: Preoperative EQUATION    Planned Procedure: Procedure(s) (LRB):  ARTHROSCOPY, KNEE, WITH MENISCECTOMY (Left)  ARTHROSCOPY, KNEE, WITH CHONDROPLASTY (Left)  Requested Anesthesia Type:General  Surgeon: Bairon Salas MD  Service: Orthopedics  Known or anticipated Date of Surgery:11/22/2019    Surgeon notes: reviewed    Electronic QUestionnaire Assessment completed via nurse interview with patient.      Triage considerations:     The patient has no apparent active cardiac condition (No unstable coronary Syndrome such as severe unstable angina or recent [<1 month] myocardial infarction, decompensated CHF, severe valvular   disease or significant arrhythmia)    Previous anesthesia records:MAC and No problems    Last PCP note: 3-6 months ago Cleared by  11/19  Subspecialty notes: Cardiology: General    Other important co-morbidities: HLD, HTN, mild dilation of aorta, BPH     Tests already available:  EKG, CBC, CMP 11/19/19            Instructions given. (See in Nurse's note)    Optimization: Medical Opinion Indicated  PCP CLEARED      Patient  has previously scheduled Medical Appointment:    Navigation:   Results will be tracked by Preop Clinic.                  Plans per surgeon and Follow-up per Surgeon

## 2019-11-22 ENCOUNTER — HOSPITAL ENCOUNTER (OUTPATIENT)
Facility: HOSPITAL | Age: 69
Discharge: HOME | End: 2019-11-22
Attending: ORTHOPAEDIC SURGERY | Admitting: ORTHOPAEDIC SURGERY
Payer: COMMERCIAL

## 2019-11-22 ENCOUNTER — ANESTHESIA (OUTPATIENT)
Dept: SURGERY | Facility: HOSPITAL | Age: 69
End: 2019-11-22
Payer: COMMERCIAL

## 2019-11-22 ENCOUNTER — TELEPHONE (OUTPATIENT)
Dept: SPORTS MEDICINE | Facility: CLINIC | Age: 69
End: 2019-11-22

## 2019-11-22 DIAGNOSIS — S83.282D OTHER TEAR OF LATERAL MENISCUS OF LEFT KNEE AS CURRENT INJURY, SUBSEQUENT ENCOUNTER: ICD-10-CM

## 2019-11-22 DIAGNOSIS — G89.29 CHRONIC PAIN OF LEFT KNEE: Primary | ICD-10-CM

## 2019-11-22 DIAGNOSIS — M25.562 CHRONIC PAIN OF LEFT KNEE: Primary | ICD-10-CM

## 2019-11-22 DIAGNOSIS — S83.242D ACUTE MEDIAL MENISCUS TEAR OF LEFT KNEE, SUBSEQUENT ENCOUNTER: ICD-10-CM

## 2019-11-22 PROBLEM — S83.242A ACUTE MEDIAL MENISCUS TEAR OF LEFT KNEE: Status: ACTIVE | Noted: 2019-11-22

## 2019-11-22 PROCEDURE — 25000003 PHARM REV CODE 250: Performed by: ORTHOPAEDIC SURGERY

## 2019-11-22 PROCEDURE — 29880 PR KNEE SCOPE MED/LAT MENISCECTOMY: ICD-10-PCS | Mod: LT,,, | Performed by: ORTHOPAEDIC SURGERY

## 2019-11-22 PROCEDURE — 36000711: Performed by: ORTHOPAEDIC SURGERY

## 2019-11-22 PROCEDURE — 63600175 PHARM REV CODE 636 W HCPCS: Performed by: PHYSICIAN ASSISTANT

## 2019-11-22 PROCEDURE — 63600175 PHARM REV CODE 636 W HCPCS: Performed by: NURSE ANESTHETIST, CERTIFIED REGISTERED

## 2019-11-22 PROCEDURE — 63600175 PHARM REV CODE 636 W HCPCS: Performed by: ORTHOPAEDIC SURGERY

## 2019-11-22 PROCEDURE — 99900035 HC TECH TIME PER 15 MIN (STAT)

## 2019-11-22 PROCEDURE — D9220A PRA ANESTHESIA: Mod: CRNA,,, | Performed by: NURSE ANESTHETIST, CERTIFIED REGISTERED

## 2019-11-22 PROCEDURE — 71000015 HC POSTOP RECOV 1ST HR: Performed by: ORTHOPAEDIC SURGERY

## 2019-11-22 PROCEDURE — 29880 ARTHRS KNE SRG MNISECTMY M&L: CPT | Mod: LT,,, | Performed by: ORTHOPAEDIC SURGERY

## 2019-11-22 PROCEDURE — D9220A PRA ANESTHESIA: ICD-10-PCS | Mod: ANES,,, | Performed by: ANESTHESIOLOGY

## 2019-11-22 PROCEDURE — 94761 N-INVAS EAR/PLS OXIMETRY MLT: CPT

## 2019-11-22 PROCEDURE — 37000009 HC ANESTHESIA EA ADD 15 MINS: Performed by: ORTHOPAEDIC SURGERY

## 2019-11-22 PROCEDURE — D9220A PRA ANESTHESIA: ICD-10-PCS | Mod: CRNA,,, | Performed by: NURSE ANESTHETIST, CERTIFIED REGISTERED

## 2019-11-22 PROCEDURE — 37000008 HC ANESTHESIA 1ST 15 MINUTES: Performed by: ORTHOPAEDIC SURGERY

## 2019-11-22 PROCEDURE — D9220A PRA ANESTHESIA: Mod: ANES,,, | Performed by: ANESTHESIOLOGY

## 2019-11-22 PROCEDURE — 27200651 HC AIRWAY, LMA: Performed by: NURSE ANESTHETIST, CERTIFIED REGISTERED

## 2019-11-22 PROCEDURE — 36000710: Performed by: ORTHOPAEDIC SURGERY

## 2019-11-22 PROCEDURE — 71000033 HC RECOVERY, INTIAL HOUR: Performed by: ORTHOPAEDIC SURGERY

## 2019-11-22 PROCEDURE — 27201423 OPTIME MED/SURG SUP & DEVICES STERILE SUPPLY: Performed by: ORTHOPAEDIC SURGERY

## 2019-11-22 PROCEDURE — 25000003 PHARM REV CODE 250: Performed by: ANESTHESIOLOGY

## 2019-11-22 PROCEDURE — 25000003 PHARM REV CODE 250: Performed by: NURSE ANESTHETIST, CERTIFIED REGISTERED

## 2019-11-22 RX ORDER — KETAMINE HCL IN 0.9 % NACL 50 MG/5 ML
SYRINGE (ML) INTRAVENOUS
Status: DISCONTINUED | OUTPATIENT
Start: 2019-11-22 | End: 2019-11-25

## 2019-11-22 RX ORDER — ACETAMINOPHEN 500 MG
1000 TABLET ORAL EVERY 8 HOURS
Status: DISCONTINUED | OUTPATIENT
Start: 2019-11-22 | End: 2019-11-22 | Stop reason: HOSPADM

## 2019-11-22 RX ORDER — FENTANYL CITRATE 50 UG/ML
INJECTION, SOLUTION INTRAMUSCULAR; INTRAVENOUS
Status: DISCONTINUED | OUTPATIENT
Start: 2019-11-22 | End: 2019-11-25

## 2019-11-22 RX ORDER — ONDANSETRON 4 MG/1
8 TABLET, ORALLY DISINTEGRATING ORAL EVERY 8 HOURS PRN
Status: DISCONTINUED | OUTPATIENT
Start: 2019-11-22 | End: 2019-11-22 | Stop reason: HOSPADM

## 2019-11-22 RX ORDER — BUPIVACAINE HYDROCHLORIDE 2.5 MG/ML
INJECTION, SOLUTION EPIDURAL; INFILTRATION; INTRACAUDAL
Status: DISCONTINUED | OUTPATIENT
Start: 2019-11-22 | End: 2019-11-22 | Stop reason: HOSPADM

## 2019-11-22 RX ORDER — PROPOFOL 10 MG/ML
VIAL (ML) INTRAVENOUS
Status: DISCONTINUED | OUTPATIENT
Start: 2019-11-22 | End: 2019-11-25

## 2019-11-22 RX ORDER — SODIUM CHLORIDE 9 MG/ML
INJECTION, SOLUTION INTRAVENOUS CONTINUOUS
Status: DISCONTINUED | OUTPATIENT
Start: 2019-11-22 | End: 2019-11-22 | Stop reason: HOSPADM

## 2019-11-22 RX ORDER — PHENYLEPHRINE HYDROCHLORIDE 10 MG/ML
INJECTION INTRAVENOUS
Status: DISCONTINUED | OUTPATIENT
Start: 2019-11-22 | End: 2019-11-25

## 2019-11-22 RX ORDER — GLYCOPYRROLATE 0.2 MG/ML
INJECTION INTRAMUSCULAR; INTRAVENOUS
Status: DISCONTINUED | OUTPATIENT
Start: 2019-11-22 | End: 2019-11-25

## 2019-11-22 RX ORDER — TRAMADOL HYDROCHLORIDE 50 MG/1
50 TABLET ORAL EVERY 4 HOURS PRN
Status: DISCONTINUED | OUTPATIENT
Start: 2019-11-22 | End: 2019-11-22 | Stop reason: HOSPADM

## 2019-11-22 RX ORDER — MIDAZOLAM HYDROCHLORIDE 1 MG/ML
INJECTION, SOLUTION INTRAMUSCULAR; INTRAVENOUS
Status: DISCONTINUED | OUTPATIENT
Start: 2019-11-22 | End: 2019-11-25

## 2019-11-22 RX ORDER — FENTANYL CITRATE 50 UG/ML
25 INJECTION, SOLUTION INTRAMUSCULAR; INTRAVENOUS EVERY 5 MIN PRN
Status: DISCONTINUED | OUTPATIENT
Start: 2019-11-22 | End: 2019-11-22 | Stop reason: HOSPADM

## 2019-11-22 RX ORDER — ACETAMINOPHEN 500 MG
1000 TABLET ORAL
Status: COMPLETED | OUTPATIENT
Start: 2019-11-22 | End: 2019-11-22

## 2019-11-22 RX ORDER — METOCLOPRAMIDE HYDROCHLORIDE 5 MG/ML
5 INJECTION INTRAMUSCULAR; INTRAVENOUS EVERY 6 HOURS PRN
Status: DISCONTINUED | OUTPATIENT
Start: 2019-11-22 | End: 2019-11-22 | Stop reason: HOSPADM

## 2019-11-22 RX ORDER — DIPHENHYDRAMINE HYDROCHLORIDE 50 MG/ML
25 INJECTION INTRAMUSCULAR; INTRAVENOUS EVERY 6 HOURS PRN
Status: DISCONTINUED | OUTPATIENT
Start: 2019-11-22 | End: 2019-11-22 | Stop reason: HOSPADM

## 2019-11-22 RX ORDER — CELECOXIB 200 MG/1
400 CAPSULE ORAL
Status: COMPLETED | OUTPATIENT
Start: 2019-11-22 | End: 2019-11-22

## 2019-11-22 RX ORDER — OXYCODONE HYDROCHLORIDE 5 MG/1
10 TABLET ORAL EVERY 4 HOURS PRN
Status: DISCONTINUED | OUTPATIENT
Start: 2019-11-22 | End: 2019-11-22 | Stop reason: HOSPADM

## 2019-11-22 RX ORDER — ONDANSETRON 2 MG/ML
INJECTION INTRAMUSCULAR; INTRAVENOUS
Status: DISCONTINUED | OUTPATIENT
Start: 2019-11-22 | End: 2019-11-25

## 2019-11-22 RX ORDER — OXYCODONE HYDROCHLORIDE 5 MG/1
5 TABLET ORAL EVERY 4 HOURS PRN
Status: DISCONTINUED | OUTPATIENT
Start: 2019-11-22 | End: 2019-11-22 | Stop reason: HOSPADM

## 2019-11-22 RX ORDER — DEXAMETHASONE SODIUM PHOSPHATE 4 MG/ML
INJECTION, SOLUTION INTRA-ARTICULAR; INTRALESIONAL; INTRAMUSCULAR; INTRAVENOUS; SOFT TISSUE
Status: DISCONTINUED | OUTPATIENT
Start: 2019-11-22 | End: 2019-11-25

## 2019-11-22 RX ORDER — MIDAZOLAM HYDROCHLORIDE 1 MG/ML
0.5 INJECTION INTRAMUSCULAR; INTRAVENOUS
Status: DISCONTINUED | OUTPATIENT
Start: 2019-11-22 | End: 2019-11-22 | Stop reason: HOSPADM

## 2019-11-22 RX ORDER — HYDROMORPHONE HYDROCHLORIDE 1 MG/ML
0.2 INJECTION, SOLUTION INTRAMUSCULAR; INTRAVENOUS; SUBCUTANEOUS EVERY 5 MIN PRN
Status: DISCONTINUED | OUTPATIENT
Start: 2019-11-22 | End: 2019-11-22 | Stop reason: HOSPADM

## 2019-11-22 RX ORDER — EPINEPHRINE 1 MG/ML
INJECTION, SOLUTION INTRACARDIAC; INTRAMUSCULAR; INTRAVENOUS; SUBCUTANEOUS
Status: DISCONTINUED | OUTPATIENT
Start: 2019-11-22 | End: 2019-11-22 | Stop reason: HOSPADM

## 2019-11-22 RX ORDER — KETOROLAC TROMETHAMINE 30 MG/ML
INJECTION, SOLUTION INTRAMUSCULAR; INTRAVENOUS
Status: DISCONTINUED | OUTPATIENT
Start: 2019-11-22 | End: 2019-11-25

## 2019-11-22 RX ORDER — LORAZEPAM 2 MG/ML
0.25 INJECTION INTRAMUSCULAR ONCE AS NEEDED
Status: DISCONTINUED | OUTPATIENT
Start: 2019-11-22 | End: 2019-11-22 | Stop reason: HOSPADM

## 2019-11-22 RX ORDER — LIDOCAINE HCL/PF 100 MG/5ML
SYRINGE (ML) INTRAVENOUS
Status: DISCONTINUED | OUTPATIENT
Start: 2019-11-22 | End: 2019-11-25

## 2019-11-22 RX ORDER — CEFAZOLIN SODIUM 1 G/3ML
2 INJECTION, POWDER, FOR SOLUTION INTRAMUSCULAR; INTRAVENOUS
Status: DISCONTINUED | OUTPATIENT
Start: 2019-11-22 | End: 2019-11-22 | Stop reason: HOSPADM

## 2019-11-22 RX ADMIN — FENTANYL CITRATE 25 MCG: 50 INJECTION, SOLUTION INTRAMUSCULAR; INTRAVENOUS at 08:11

## 2019-11-22 RX ADMIN — KETOROLAC TROMETHAMINE 15 MG: 30 INJECTION, SOLUTION INTRAMUSCULAR at 08:11

## 2019-11-22 RX ADMIN — PHENYLEPHRINE HYDROCHLORIDE 100 MCG: 10 INJECTION INTRAVENOUS at 07:11

## 2019-11-22 RX ADMIN — ACETAMINOPHEN 1000 MG: 500 TABLET ORAL at 06:11

## 2019-11-22 RX ADMIN — CELECOXIB 400 MG: 200 CAPSULE ORAL at 06:11

## 2019-11-22 RX ADMIN — FENTANYL CITRATE 50 MCG: 50 INJECTION, SOLUTION INTRAMUSCULAR; INTRAVENOUS at 07:11

## 2019-11-22 RX ADMIN — PROPOFOL 200 MG: 10 INJECTION, EMULSION INTRAVENOUS at 07:11

## 2019-11-22 RX ADMIN — OXYCODONE HYDROCHLORIDE 10 MG: 5 TABLET ORAL at 08:11

## 2019-11-22 RX ADMIN — DEXAMETHASONE SODIUM PHOSPHATE 8 MG: 4 INJECTION, SOLUTION INTRAMUSCULAR; INTRAVENOUS at 07:11

## 2019-11-22 RX ADMIN — ONDANSETRON 4 MG: 2 INJECTION INTRAMUSCULAR; INTRAVENOUS at 07:11

## 2019-11-22 RX ADMIN — MIDAZOLAM 2 MG: 1 INJECTION INTRAMUSCULAR; INTRAVENOUS at 07:11

## 2019-11-22 RX ADMIN — GLYCOPYRROLATE 0.2 MG: 0.2 INJECTION, SOLUTION INTRAMUSCULAR; INTRAVENOUS at 07:11

## 2019-11-22 RX ADMIN — Medication 1010 MG: at 07:11

## 2019-11-22 RX ADMIN — LIDOCAINE HYDROCHLORIDE 100 MG: 20 INJECTION, SOLUTION INTRAVENOUS at 07:11

## 2019-11-22 RX ADMIN — SODIUM CHLORIDE 1000 ML: 0.9 INJECTION, SOLUTION INTRAVENOUS at 06:11

## 2019-11-22 NOTE — DISCHARGE INSTRUCTIONS
[address], [place]                                                                                          [phone]                   Postoperative Instructions for Knee Surgery                 Your Surgery Included:   Open               Arthroscopic   [] Ligament Repair       [] Diagnostic           [] ACL     [] PCL     [] MCL     [] PLLC      [] Synovectomy / Plica Removal [] Meniscal Cartilage Repair / Transplantation      [] Lysis of Adhesions / Manipulation [] Articular Cartilage Repair      [] Interval Release           [] Microfracture       [] OATS   [] ACI      [x] Meniscectomy           [] Osteochondral Allograft      [] Meniscal Cartilage Repair  [] Patellar Realignment       [x] Debridement / Chondroplasty         [] Lateral Release   [] Ligament Repair      [] Articular Cartilage Repair          [] Extensor Mechanism             []   Microfracture  []  OATS         []  Cartilage Biopsy [] Tendon Repair          [] Ligament Reconstruction          [] Patella                  [] Quadriceps             []   ACL    []   PCL  [] High Tibial Osteotomy       [] PRP Arthrocentesis  [] Joint Replacement         [] Amniox Arthrocentesis           [] Unicompartmental   [] Patellofemoral                    [] Total Knee                  Call our office ([phone]) immediately if you experience any of the following:       Excessive bleeding or pus like drainage at the incision site       Uncontrollable pain not relieved by pain medication       Excessive swelling or redness at the incision site       Fever above 101.5 degrees not controlled with Tylenol or Motrin       Shortness of Breath       Any foul odor or blistering from the surgery site    FOR EMERGENCIES: If any unusual problems or difficulties occur, call our office at [phone], or page the  at [phone] who will direct your call appropriately    1.   Pain Management: A cold therapy cuff,  pain medications, local injections, and in some cases, regional anesthesia injections are used to manage your post-operative pain. The decision to use each of these options is based on their risks and benefits.     Medications: You were given one or more of the following medication prescriptions before leaving the hospital. Have the prescriptions filled at a pharmacy on your way home and follow the instructions on the bottles. If you need a refill, please call your pharmacy.      Narcotic Medication (usually Vicodin ES, Lortab, Percocet or Nucynta): Begin taking the medication before your knee starts to hurt. Some patients do not like to take any medication, but if you wait until your pain is severe before taking, you will be very uncomfortable for several hours waiting for the narcotic to work. Always take with food.     Nausea / Vomiting: For this issue, we prescribe Phenergan, use this medication as directed.     Cold Therapy: You may have been sent home with a [facility] cold therapy unit and wrap for your knee. Fill with ice and water to the indicated fill line and use throughout the day for the first two days and then as needed to help relieve pain and control swelling.      Regional Anesthesia Injections (Blocks): You may have been given a regional nerve block either before or after surgery. This may make your entire leg numb for 24-36 hours.                            * Proceed with caution when bearing weight on your leg.     2.   Diet: Eat a bland diet for the first day after surgery. Progress your diet as tolerated. Constipation may occur with Narcotic usage, contact our office if you are experiencing constipation.    3.   Activity: Limit your activity during the first 48 hours, keep your leg elevated with pillows under your heel. After the first 48 hours at home, increase your activity level based on your symptoms.    4.   Dressing Change: Remove the dressing on the 3rd day. It is normal for some blood  "to be seen on the dressings. It is also normal for you to see apparent bruising on the skin around your knee when you remove the dressing. If present, leave the steri-strip tape across the incisions. If you are concerned by the drainage or the appearance of your knee, please call one of the numbers listed below.    5.   Showering: You may shower on the 3rd day after surgery with waterproof bandages over small incisions. If you have an incision with Prineo (clear mesh), it does not need to be covered. Do not submerge in any water until after your postoperative appointment in clinic.    6.   Your procedure did not require a post-operative brace.    7.   Your procedure did not require a Continuous Passive Motion (CPM) device.    8.   Weight Bearing: You may have been sent home with crutches. If instructed (see below), use these crutches at all times unless at complete rest.      [x] Full weight bearing            [x]  NOW         9.  Knee Exercises: Begin these exercises the first day after surgery in order to help you regain your motion and strength. You may do the following marked exercises:     [x] Quad Sets - Begin activating your quadriceps muscle by driving your          knee downward into full knee extension while seated on a table or bed   with a towel rolled and propped under your heel     [x] Straight Leg Raise (SLR) - While nayana your quadriceps muscle, lift     your fully extended leg to the level of your non-operative knee (as shown)     [x] Heel Slides - With the knee straight, slide your heel slowly toward your   buttocks, hold at the endpoint for 10-15 seconds, then slowly straighten     [x] Ankle pumps - With your knee straight, move your ankle in a "pumping"    fashion to activate your calf and leg muscles      10.  Physical Therapy: Physical therapy is an essential component to your recovery from surgery. Your physical therapy will start in 3 days.    FIRST POSTOPERATIVE VISIT: As scheduled. "     Discharge Instructions: After Your Surgery  You've just had surgery. During surgery, you were given medicine called anesthesia to keep you relaxed and free of pain. After surgery, you may have some pain or nausea. This is common. Here are some tips for feeling better and getting well after surgery.    Stay on schedule with your medicine.   Going home  Your healthcare provider will show you how to take care of yourself when you go home. He or she will also answer your questions. Have an adult family member or friend drive you home. For the first 24 hours after your surgery:  · Do not drive or use heavy equipment.  · Do not make important decisions or sign legal papers.  · Do not drink alcohol.  · Have someone stay with you, if needed. He or she can watch for problems and help keep you safe.  Be sure to go to all follow-up visits with your healthcare provider. And rest after your surgery for as long as your healthcare provider tells you to.  Coping with pain  If you have pain after surgery, pain medicine will help you feel better. Take it as told, before pain becomes severe. Also, ask your healthcare provider or pharmacist about other ways to control pain. This might be with heat, ice, or relaxation. And follow any other instructions your surgeon or nurse gives you.  Tips for taking pain medicine  To get the best relief possible, remember these points:  · Pain medicines can upset your stomach. Taking them with a little food may help.  · Most pain relievers taken by mouth need at least 20 to 30 minutes to start to work.  · Taking medicine on a schedule can help you remember to take it. Try to time your medicine so that you can take it before starting an activity. This might be before you get dressed, go for a walk, or sit down for dinner.  · Constipation is a common side effect of pain medicines. Call your healthcare provider before taking any medicines such as laxatives or stool softeners to help ease  constipation. Also ask if you should skip any foods. Drinking lots of fluids and eating foods such as fruits and vegetables that are high in fiber can also help. Remember, do not take laxatives unless your surgeon has prescribed them.  · Drinking alcohol and taking pain medicine can cause dizziness and slow your breathing. It can even be deadly. Do not drink alcohol while taking pain medicine.  · Pain medicine can make you react more slowly to things. Do not drive or run machinery while taking pain medicine.  Your healthcare provider may tell you to take acetaminophen to help ease your pain. Ask him or her how much you are supposed to take each day. Acetaminophen or other pain relievers may interact with your prescription medicines or other over-the-counter (OTC) medicines. Some prescription medicines have acetaminophen and other ingredients. Using both prescription and OTC acetaminophen for pain can cause you to overdose. Read the labels on your OTC medicines with care. This will help you to clearly know the list of ingredients, how much to take, and any warnings. It may also help you not take too much acetaminophen. If you have questions or do not understand the information, ask your pharmacist or healthcare provider to explain it to you before you take the OTC medicine.  Managing nausea  Some people have an upset stomach after surgery. This is often because of anesthesia, pain, or pain medicine, or the stress of surgery. These tips will help you handle nausea and eat healthy foods as you get better. If you were on a special food plan before surgery, ask your healthcare provider if you should follow it while you get better. These tips may help:  · Do not push yourself to eat. Your body will tell you when to eat and how much.  · Start off with clear liquids and soup. They are easier to digest.  · Next try semi-solid foods, such as mashed potatoes, applesauce, and gelatin, as you feel ready.  · Slowly move to solid  foods. Don't eat fatty, rich, or spicy foods at first.  · Do not force yourself to have 3 large meals a day. Instead eat smaller amounts more often.  · Take pain medicines with a small amount of solid food, such as crackers or toast, to avoid nausea.     Call your surgeon if  · You still have pain an hour after taking medicine. The medicine may not be strong enough.  · You feel too sleepy, dizzy, or groggy. The medicine may be too strong.  · You have side effects like nausea, vomiting, or skin changes, such as rash, itching, or hives.       If you have obstructive sleep apnea  You were given anesthesia medicine during surgery to keep you comfortable and free of pain. After surgery, you may have more apnea spells because of this medicine and other medicines you were given. The spells may last longer than usual.   At home:  · Keep using the continuous positive airway pressure (CPAP) device when you sleep. Unless your healthcare provider tells you not to, use it when you sleep, day or night. CPAP is a common device used to treat obstructive sleep apnea.  · Talk with your provider before taking any pain medicine, muscle relaxants, or sedatives. Your provider will tell you about the possible dangers of taking these medicines.  Date Last Reviewed: 12/1/2016 © 2000-2017 [employer]. [address] [place] [address], [place] [address]. All rights reserved. This information is not intended as a substitute for professional medical care. Always follow your healthcare professional's instructions.        PATIENT INSTRUCTIONS  POST-ANESTHESIA    IMMEDIATELY FOLLOWING SURGERY:  Do not drive or operate machinery for the first twenty four hours after surgery.  Do not make any important decisions for twenty four hours after surgery or while taking narcotic pain medications or sedatives.  If you develop intractable nausea and vomiting or a severe headache please notify your doctor immediately.    FOLLOW-UP:  Please make an  appointment with your surgeon as instructed. You do not need to follow up with anesthesia unless specifically instructed to do so.    WOUND CARE INSTRUCTIONS (if applicable):  Keep a dry clean dressing on the anesthesia/puncture wound site if there is drainage.  Once the wound has quit draining you may leave it open to air.  Generally you should leave the bandage intact for twenty four hours unless there is drainage.  If the epidural site drains for more than 36-48 hours please call the anesthesia department.    QUESTIONS?:  Please feel free to call your physician or the hospital  if you have any questions, and they will be happy to assist you.       [facility] Anesthesia Department  [address]  [phone]      Wound Check After Surgery: Bleeding  Surgery involves cutting through layers of skin, fatty tissue, muscle, and sometimes bone and cartilage. Sutures (stitches) or staples are used to close all layers of the wound. The sutures on the inside will dissolve in about 2 to 3 weeks. Any sutures or staples used on the outside need to be removed in about 7 to 14 days, depending on the location.  It is normal to have some clear or bloody discharge on the wound covering or bandage (dressing) for the first few days after surgery. If your wound was sutured (sewn) closed, you should not have to change the dressing more than three times a day in the first few days. Bleeding or discharge requiring more frequent dressing changes can be a sign of a problem.  Home care  Different types of surgery require different types of care and dressing changes. It is important to follow all instructions and advice from your surgeon, as well as other members of your healthcare team.  Wound care  · Keep the wound clean, as directed by your healthcare provider.  · Change the dressing as directed. Change the dressing sooner if it becomes wet or stained with blood or fluid from the wound.  · Bathe with a  sponge (no shower or tub baths) for the first few days after surgery, or until there is no more drainage from the wound. Unless you received different instructions from your surgeon, you can then shower. Do not soak the area in water (no baths or swimming) until the tape, sutures, or staples are removed and any wound opening has dried out and healed.  Changing the dressing  · Wash your hands before changing the dressings.  · Carefully remove the dressing and tape; don't just yank it off. If it sticks to the wound, you may need to wet it a little to remove it, unless your healthcare provider told you not to wet it.  · Wash your hands again before putting on a new, clean dressing.  · Gently clean the wound with clean water (or saline) using gauze or a clean washcloth. Do not rub it or pick at it.  · Do not use soap, alcohol, hydrogen peroxide, or any other cleanser.  · If you were told to dry the wound before putting on a new dressing, gently pat it dry. Do not rub.  · Throw out the old dressing. Do not reuse it!  · Wash your hands again when you are done.  Types of dressings  Your healthcare team will tell you what type of dressing to put on your wound. Follow your healthcare team's instructions carefully, and contact them if you have any questions. Two common types of dressings are described below. You may have one of these or another type.  · Dry dressing. Use dry gauze. If the wound is still draining, use a nonadherent dressing, which shouldn't stick to the wound.  · Wet-to-dry dressing. Wet the gauze, and squeeze out the excess water (or saline), before putting it on. Then, cover this with a dry pad.  Medicines  · If you were given antibiotics, take them until they are used up or your healthcare provider tells you to stop. It is important to finish the antibiotics even though you feel better, to make sure the infection has cleared.  · You can take acetaminophen or ibuprofen for pain, unless you were given a  different pain medicine to use. (Note: If you have chronic liver or kidney disease, or have ever had a stomach ulcer or gastrointestinal bleeding, or are taking blood thinner medicines, talk with your healthcare provider before using these medicines.)  · Aspirin should never be used in anyone under 18 years of age who is ill with a fever. It may cause severe liver damage.  Follow-up care  Follow up with your healthcare provider, or as advised, for your next wound check or removal of sutures, staples, or tape.  · If a culture was done, you will be notified if the results will affect your treatment. You can call as directed for the results.  · If imaging tests, such as X-rays, an ultrasound, or CT scan were done, they will be reviewed by a specialist. You will be notified of the results, especially if they affect treatment.  Call 911  Call emergency services right away if any of these occur:  · Trouble breathing or swallowing, wheezing  · Hoarse voice or trouble speaking  · Extreme confusion  · Extreme drowsiness or trouble awakening  · Fainting or loss of consciousness  · Rapid heart rate or very slow heart rate  · Vomiting blood, or large amounts of blood in stool  · Discomfort in the center of the chest that feels like pressure, squeezing, a sense of fullness, or pain  · Discomfort or pain in other upper body areas, such as the back, one or both arms, neck, jaw, or stomach  · Stroke symptoms (spot a stroke FAST)  ¨ F: Face drooping. One side of the face is numb or droops.  ¨ A: Arm weakness. One arm feels weak or numb.  ¨ S: Speech difficulty: Speech is slurred, or the person is unable to speak.  ¨ T: Time to call 911. Even if symptoms go away, call 911.  When to seek medical advice  Call your healthcare provider right away if any of the following occur:  · Fluid or blood soaking 5 or more bandages a day during the first 3 days after surgery  · Fluid or blood still draining from the wound more than 3 days after  surgery  · Increasing pain at the site of surgery  · Fever over 100.4º F (38.0º C)  · Redness around the wound  · Pus coming from the wound  · Vomiting, constipation, or diarrhea  Date Last Reviewed: 9/27/2015  © 7087-6648 [employer]. [address] [place] [id number]. All rights reserved. This information is not intended as a substitute for professional medical care. Always follow your healthcare professional's instructions.  ----------------------------------------------------------------------------------------------------------------------------------------------------------------------------------------------------------------------------------------------  Crutch Walking  Crutch adjustment    Make sure the crutches you use are adjusted to fit you. When you stand, there should be room to fit 2 to 3 fingers between the top of the crutch and your armpit. Your elbow should be slightly bent when holding the hand . When your arms hang down, the crutch handle should be at the top of your hip.   Crutch walking  Place the crutches forward about 1 foot in front of you. The crutches should be a little farther apart than your body. Lean your weight forward as you push down on the hand . Make sure your weight is on your hands and your strong leg, not your armpits. Let your body swing forward, landing on the strong leg. Move the crutches forward again. The crutch and your injured leg should move together.  Going up steps with no handrails  (Up with the good leg)  · With both crutches (under each armpit) on the same step as your feet, push down on the hand .  · Balancing with very light pressure on the weak leg, let your hands support your weight. Raise your strong leg onto the next higher step.  · Transfer all your weight to your strong leg (still bent). Move the crutches up to the next step, next to your strong leg.  · Keep your weight evenly balanced on the two crutches and your  strong leg. Straighten your strong knee as you raise your weak leg up to the next step.  Going down steps with no handrails  (Down with the bad leg)  · With both crutches (under each armpit) on the same step as your feet, push down on the hand .  · Keep your weight evenly balanced on the two crutches and your strong leg. Bend your strong knee as you lower your weak leg down to the next step.  · Let your strong leg support you (still bent) as you move the crutches down next to the weak leg.  · Transfer your weight to your hands. Balance with very light pressure on your weak leg as you lower your strong leg next to your weak leg  Going up steps with handrails  (Up with the good leg)  · Face the stairs, holding the handrail with one hand. Place both crutches under your armpit on the opposite side. Push down on the hand .  · Balancing with very light pressure on the weak leg, let your hands support your weight. Raise your strong leg onto the next higher step.  · Transfer all your weight to your strong leg (still bent) as you move the crutches up (while holding on to the handrail) to the next step next to the strong leg.  · Keep your weight evenly balanced on the handrail, the crutches (still under the same armpit opposite the handrail), and your strong leg. Straighten your strong knee as you raise the weak leg up to the next step.  Going down steps with handrails  (Down with the bad leg)  · Face the stairs, holding the handrail with one hand. Place both crutches under your armpit on the opposite side. Push down on the hand .  · Balance your weight evenly on the crutches, handrail, and your strong leg. Then bend your strong knee as you lower the weak leg down to the next step.  · Let the handrail and your strong leg support you (still bent) as you move the crutches down alongside the weak leg.  · While holding on to the handrail and crutches (under the same armpit on the other side), transfer your weight  to your hands, balancing with very light pressure on the weak leg as you lower your strong leg alongside your weak leg  Tip: If you are worried about falling or you feel unsteady, try sitting when going up or down stairs instead. Sit on the bottom step and keep your injured leg out in front of you. Hold your crutches flat against the stairs. Then slide up to the next step on your bottom. Use your free hand and good leg for support. Face the same way when going down stairs.  Date Last Reviewed: 10/6/2015  © 0008-4827 [employer]. [address] [place] [address]. All rights reserved. This information is not intended as a substitute for professional medical care. Always follow your healthcare professional's instructions.

## 2019-11-22 NOTE — PROGRESS NOTES
Informed charge MAURICIO Tomlinson needed orders for PT. Bushra spoke with Bushra at Dr Salas's office. Will put in order for PT to be started Monday.

## 2019-11-22 NOTE — PROGRESS NOTES
Pt dressed with assistance from wifeAndreina Tomlinson at Dr Salas's office will call pt to schedule PT. Voided 150 cc clear yellow urine. Ready for discharge.

## 2019-11-22 NOTE — PLAN OF CARE
VSS. Denies pain and nausea. Discharge instructions reviewed and hard copy provided. Polar care teaching completed. Crutches provided and set to pt's height. Bedside delivery meds with wife. Tolerated PO liquids. Provided urinal to void. Pt informed RN he did not have a PT appt set for Monday.

## 2019-11-22 NOTE — H&P
Jacques Munoz  is here for a completion of his perioperative paperwork. he  Is scheduled to undergo   1.  Left knee arthroscopic menisectomy  2.  Left knee arthroscopic chondroplasty  3.  Left knee arthroscopic partial synovectomy on 11/22/19.  He is a healthy individual and does not need clearance for this procedure.     Risks, indications and benefits of the surgical procedure were discussed with the patient. All questions with regard to surgery, rehab, expected return to functional activities, activities of daily living and recreational endeavors were answered to his satisfaction.    Patient was informed and understands the risks of surgery are greater for patients with a current condition or history of heart disease, obesity, clotting disorders, recurrent infections, steroid use, current or past smoking, and factors such as sedentary lifestyle and noncompliance with medications, therapy or follow-up. The degree of the increased risk is hard to estimate with any degree of precision.    Once no other questions were asked, a brief history and physical exam was then performed.    PAST MEDICAL HISTORY:   Past Medical History:   Diagnosis Date    BPH (benign prostatic hyperplasia)     High cholesterol     Hypertension     Kidney stone      PAST SURGICAL HISTORY:   Past Surgical History:   Procedure Laterality Date    TONSILLECTOMY       FAMILY HISTORY:   Family History   Problem Relation Age of Onset    Hypertension Mother     Hypertension Father     Melanoma Neg Hx      SOCIAL HISTORY:   Social History     Socioeconomic History    Marital status:      Spouse name: Not on file    Number of children: Not on file    Years of education: Not on file    Highest education level: Not on file   Occupational History    Occupation: physician   Social Needs    Financial resource strain: Not on file    Food insecurity:     Worry: Not on file     Inability: Not on file    Transportation needs:      Medical: Not on file     Non-medical: Not on file   Tobacco Use    Smoking status: Never Smoker    Smokeless tobacco: Never Used   Substance and Sexual Activity    Alcohol use: Yes    Drug use: No    Sexual activity: Yes     Partners: Female   Lifestyle    Physical activity:     Days per week: Not on file     Minutes per session: Not on file    Stress: Not on file   Relationships    Social connections:     Talks on phone: Not on file     Gets together: Not on file     Attends Muslim service: Not on file     Active member of club or organization: Not on file     Attends meetings of clubs or organizations: Not on file     Relationship status: Not on file   Other Topics Concern    Not on file   Social History Narrative    Not on file       MEDICATIONS:   Current Facility-Administered Medications:     0.9%  NaCl infusion, , Intravenous, Continuous, Sindy Murrell PA-C, 1,000 mL at 11/22/19 0628    ceFAZolin injection 2 g, 2 g, Intravenous, On Call Procedure, Sindy Murrell PA-C    fentaNYL injection 25 mcg, 25 mcg, Intravenous, Q5 Min PRN, J Carlos Cui, OSVALDO    midazolam (VERSED) 1 mg/mL injection 0.5 mg, 0.5 mg, Intravenous, PRN, J Carlos Cui, ANTOLINP  ALLERGIES: Review of patient's allergies indicates:  No Known Allergies    Review of Systems   Constitution: Negative. Negative for chills, fever and night sweats.   HENT: Negative for congestion and headaches.    Eyes: Negative for blurred vision, left vision loss and right vision loss.   Cardiovascular: Negative for chest pain and syncope.   Respiratory: Negative for cough and shortness of breath.    Endocrine: Negative for polydipsia, polyphagia and polyuria.   Hematologic/Lymphatic: Negative for bleeding problem. Does not bruise/bleed easily.   Skin: Negative for dry skin, itching and rash.   Musculoskeletal: Negative for falls and muscle weakness.   Gastrointestinal: Negative for abdominal pain and bowel incontinence.   Genitourinary:  Negative for bladder incontinence and nocturia.   Neurological: Negative for disturbances in coordination, loss of balance and seizures.   Psychiatric/Behavioral: Negative for depression. The patient does not have insomnia.    Allergic/Immunologic: Negative for hives and persistent infections.     PHYSICAL EXAM:  GEN: A&Ox3, WD WN NAD  HEENT: WNL  CHEST: CTAB, no W/R/R  HEART: RRR, no M/R/G   ABD: Soft, NT ND, BS x4 QUADS  MS: Refer to previous note for detailed MS exam  NEURO: CN II-XII intact       The surgical consent was then reviewed with the patient, who agreed with all the contents of the consent form and it was signed. he was then given the [facility] surgery packet to bring with him to [facility] for the anesthesia portion of his perioperative paperwork.     PHYSICAL THERAPY:  He was also instructed regarding physical therapy and will begin POD # 1-3. He was given a copy of the original prescription to schedule. Another copy of this prescription was also faxed to .    POST OP CARE:instructions were reviewed including care of the wound and dressing after surgery and when he can shower.     PAIN MANAGEMENT: Jacques Munoz was also given a pain management regime, which includes the TENS unit given to him by Hemant Hackett along with the education required for its use. He was also instructed regarding the Polar ice unit that will be in place after surgery and his postoperative pain medications.     PAIN MEDICATION:  Percocet 10/325mg 1 po q 4-6 hours prn pain  Ultram 50 mg one p.o. q.4-6 hours p.r.n. breakthrough pain,   Phenergan 25 mg one p.o. q.4-6 hours p.r.n. nausea and vomiting.  Celebrex 200 mg BID  Aspirin 325mg daily x 6 weeks for DVT prophylaxis starting on the evening after surgery.    Patient denies history of seizures.     Patient will also use bilateral TEDs on lower extremities, SCDs during surgery, and early ambulation post-op. If the patient was previously taking 81mg baby aspirin, they were told  to not take it will using the above stated aspirin and to restart the 81mg aspirin after completion of the aspirin dose.       Patient was also told to buy over the counter Prilosec medication and take it once daily for GI protection as long as they are taking NSAIDs or Aspirin.    DVT prophylaxis was discussed with the patient today including risk factors for developing DVTs and history of DVTs. The patient was asked if any specific recommendations were given from the doctor/s that did pre-operative surgical clearance.      Patient was asked if they were taking or using OCP pills or devices. If they answered yes, then they were instructed to stop using OCPs at this pre-operative appointment until 2 months post-op to help prevent DVT development. They understand that there are other forms of birth control that do not involve hormones. They expressed understanding that ignoring/not following this instruction could result in a DVT which could turn into a deadly pulmonary embolism.      The patient was told that narcotic pain medications may make them drowsy and instructions were given to not sign legal documents, drive or operate heavy machinery, cars, or equipment while under the influence of narcotic medications.     As there were no other questions to be asked, he was given my business card along with Bairon Salas MD business card if he has any questions or concerns prior to surgery or in the postop period.

## 2019-11-22 NOTE — OP NOTE
[facility]  Surgery Department  Operative Note    SUMMARY     Date of Procedure: 11/22/2019     Procedure: Procedure(s) (LRB):  ARTHROSCOPY, KNEE, WITH MENISCECTOMY (Left)  ARTHROSCOPY, KNEE, WITH CHONDROPLASTY (Left)     Surgeon(s) and Role:     * Bairon Salas MD - Primary     * Jameson Evans MD - Fellow      Pre-Operative Diagnosis: Chronic pain of left knee [M25.562, G89.29]  Medial meniscus tear [S83.249A]  Lateral meniscus tear [S83.289A]  Chondromalacia, left knee [M94.262]    Post-Operative Diagnosis: Post-Op Diagnosis Codes:     * Chronic pain of left knee [M25.562, G89.29]     * Medial meniscus tear [S83.249A]     * Lateral meniscus tear [S83.289A]     * Chondromalacia, left knee [M94.262]    Anesthesia: General with local    Technical Procedures Used:     DATE OF PROCEDURE:  11/22/2019      PREOPERATIVE DIAGNOSES:   1. Left knee chondromalacia  2. Left knee medial meniscus tear.   3. Left knee lateral meniscus tear     POSTOPERATIVE DIAGNOSES:   1. Left knee chondromalacia  2. Left knee medial meniscus tear.   3. Left knee lateral meniscus tear     PROCEDURES:   1. Left knee arthroscopic chondroplasty  2. Left knee arthroscopic partial medial and lateral meniscectomies     SURGEON: Bairon Salas M.D.      ASSISTANT:   Jameson Evans MD     COMPLICATIONS: None.      POSITION: Supine with arthroscopic leg bacon.      ANESTHESIA: General with local.      COMPLICATIONS: None.      TOURNIQUET TIME:  None     EBL:  Minimal     EXAMINATION UNDER ANESTHESIA:   Knee: full range of motion, no evidence of instability.      ARTHROSCOPIC FINDINGS:   1. Complex tear posterior horn / body medial meniscus.   2. Central tear with flipped piece, lateral meniscus  3. 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 He had pain laterally from a stable lateral meniscus tear in the past, but  recently had sharp pain in his knee along the medial side which was preventing him from participating in activities that he likes.   MRI confirms a new tear in his medial meniscus with a tear in his lateral meniscus with a possible flipped meniscal fragment.  After the risks and benefits of surgery were discussed with the patient, including bleeding, infection, scarring, persistent pain, risk of blood clots (DVT), pulmonary embolism, compartment syndrome, damage to cartilage, damage to neurovascular structures, plus the risks of anesthesia, including, death, stroke, and heart attack, the patient wished to proceed with operative intervention.        DESCRIPTION OF PROCEDURE:      The patient and correct limb were identified and marked in the pre-op holding area.      The patient was brought in the room. After undergoing general endotracheal anesthesia,  he was placed on a well-padded operating table. His left lower extremity was prepped and draped in usual sterile fashion. A nonsterile tourniquet was placed high up around the thigh. A well padded arthroscopic leg bacon was used during the case. The contralateral leg was placed in a well padded Adrián stirrup with bony prominences all being well padded.       After infiltrating the joint and portal sites with a total of 30 cc of 0.25% marcaine, the standard inferolateral and inferomedial portals were created. Diagnostic arthroscopy performed.      CHONDROPLASTY: The patellofemoral joint was entered. There was mild chondromalacia on the undersurface of the patella. Using a full radius shaver and biter, unstable cartilage flaps were trimmed down to a stable rim.     There was a mild synovitis noted within the anterior interval. An VAPR device was used to remove areas of irritating synovium from the overlying trochlea in the anterior interval to allow for visualization to minimize abrasion.     There was a complex tear in the body and posterior horn of the medial  meniscus.  Using combination of straight and curved biters and arthroscopic adam, the unstable portion of the medial meniscus was trimmed down to a stable rim. Approximately 25% of the body of the medial meniscus was resected down to get this to a stable position.      Attention was turned to the intercondylar notch. The ACL and PCL were intact.      Attention was turned to the lateral compartment. The lateral meniscus had a tear along the central aspect with a small flipped piece from the anterior horn which was in the intercondylar notch.  Using a biter and shaver, the central 20% was trimmed out. The lateral femoral condyle and lateral tibial plateau were intact.       Portal sites were closed with 4-0 Monocryl, covered with Mastisol, Steri-Strips, Xeroform, 4 x 4 fluffs, ABD pads and thigh-high JENNIFER hose.     The patient was extubated in the room, transferred to Recovery Room in stable condition accompanied by his physician.  There were no complications in the case.      I was present, scrubbed and did perform all critical portions of the case.        TOMEKA BOSE MD          Description of the Findings of the Procedure: above    Significant Surgical Tasks Conducted by the Assistant(s), if Applicable: none    Complications: No    Estimated Blood Loss (EBL): * No values recorded between 11/22/2019  7:31 AM and 11/22/2019  8:19 AM *           Implants: * No implants in log *    Specimens:   Specimen (12h ago, onward)    None                  Condition: Good    Disposition: PACU - hemodynamically stable.    Attestation: I was present and scrubbed for the entire procedure.    Discharge Note    SUMMARY     Admit Date: 11/22/2019    Discharge Date and Time:  11/22/2019 8:40 AM    Hospital Course (synopsis of major diagnoses, care, treatment, and services provided during the course of the hospital stay): outpatient surgery     Final Diagnosis: Post-Op Diagnosis Codes:     * Chronic pain of left knee [M25.562,  "G89.29]     * Medial meniscus tear [S83.249A]     * Lateral meniscus tear [S83.289A]     * Chondromalacia, left knee [M94.262]    Disposition: Home or Self Care    Follow Up/Patient Instructions:     Medications:  Reconciled Home Medications:      Medication List      CONTINUE taking these medications    aspirin 325 MG EC tablet  Commonly known as:  ECOTRIN  Take 1 tablet (325 mg total) by mouth 2 (two) times daily. for 21 days     atorvastatin 20 MG tablet  Commonly known as:  LIPITOR  TAKE ONE TABLET BY MOUTH EVERY DAY     famotidine 20 MG tablet  Commonly known as:  PEPCID  Take 20 mg by mouth once daily.     fish oil-omega-3 fatty acids 300-1,000 mg capsule  Take 2 g by mouth once daily.     fluticasone propionate 50 mcg/actuation nasal spray  Commonly known as:  FLONASE  1 spray by Each Nare route once daily.     HYDROcodone-acetaminophen  mg per tablet  Commonly known as:  NORCO  Take 1 tablet by mouth every 6 (six) hours as needed (POSt-OP DELIVER BEDSIDE AT [place]).     loratadine 10 mg Cap  Take by mouth.     metoprolol succinate 50 MG 24 hr tablet  Commonly known as:  TOPROL-XL  Take 1 tablet (50 mg total) by mouth once daily.     ondansetron 4 MG tablet  Commonly known as:  ZOFRAN  Take 1 tablet (4 mg total) by mouth every 8 (eight) hours as needed (TO BE DELIVERED BEDSIDE AT [place]).     typhoid DR capsule  Commonly known as:  VIVOTIF  Take 1 capsule by mouth every other day.     zolpidem 5 MG Tab  Commonly known as:  AMBIEN  Take 1 tablet (5 mg total) by mouth nightly as needed.          Discharge Procedure Orders   CRUTCHES FOR HOME USE     Order Specific Question Answer Comments   Type: Axillary    Height: 6' 1" (1.854 m)    Weight: 86.2 kg (190 lb)    Length of need (1-99 months): 99      Diet general     Call MD for:  temperature >100.4     Call MD for:  persistent nausea and vomiting     Call MD for:  severe uncontrolled pain     Call MD for:  difficulty breathing, headache or visual " disturbances     Call MD for:  redness, tenderness, or signs of infection (pain, swelling, redness, odor or green/yellow discharge around incision site)     Call MD for:  hives     Call MD for:  persistent dizziness or light-headedness     Call MD for:  extreme fatigue     Follow-up Information     Follow up In 2 weeks.

## 2019-11-22 NOTE — TRANSFER OF CARE
"Anesthesia Transfer of Care Note    Patient: Jacques Munoz    Procedure(s) Performed: Procedure(s) (LRB):  ARTHROSCOPY, KNEE, WITH MENISCECTOMY (Left)  ARTHROSCOPY, KNEE, WITH CHONDROPLASTY (Left)    Patient location: PACU    Anesthesia Type: general    Transport from OR: Transported from OR on 2-3 L/min O2 by NC with adequate spontaneous ventilation    Post pain: adequate analgesia    Post assessment: no apparent anesthetic complications    Post vital signs: stable    Level of consciousness: sedated    Nausea/Vomiting: no nausea/vomiting    Transfer of care protocol was followed      Last vitals:   Visit Vitals  BP (!) 152/91 (BP Location: Right arm, Patient Position: Lying)   Pulse 68   Temp 36.5 °C (97.7 °F) (Oral)   Resp 20   Ht 6' 1" (1.854 m)   Wt 86.2 kg (190 lb)   SpO2 100%   BMI 25.07 kg/m²     "

## 2019-11-22 NOTE — BRIEF OP NOTE
[facility]  Brief Operative Note     SUMMARY     Surgery Date: 11/22/2019     Surgeon(s) and Role:     * Bairon Salas MD - Primary     * Jameson Evans MD - Fellow    Assisting Surgeon: None    Pre-op Diagnosis:  Chronic pain of left knee [M25.562, G89.29]  Medial meniscus tear [S83.249A]  Lateral meniscus tear [S83.289A]  Chondromalacia, left knee [M94.262]    Post-op Diagnosis:  Post-Op Diagnosis Codes:     * Chronic pain of left knee [M25.562, G89.29]     * Medial meniscus tear [S83.249A]     * Lateral meniscus tear [S83.289A]     * Chondromalacia, left knee [M94.262]    Procedure(s) (LRB):  ARTHROSCOPY, KNEE, WITH MENISCECTOMY (Left)  ARTHROSCOPY, KNEE, WITH CHONDROPLASTY (Left)    Anesthesia: General    Description of the findings of the procedure:   1.  Left knee arthroscopic partial medial menisectomy  2.  Left knee arthroscopic partial lateral menisectomy  3.  Left knee arthroscopic chondroplasty  4.  Left knee arthroscopic partial synovectomy    Findings/Key Components: See Op Note    Estimated Blood Loss: * No values recorded between 11/22/2019  7:31 AM and 11/22/2019  8:19 AM *         Specimens:   Specimen (12h ago, onward)    None          Discharge Note    SUMMARY     Admit Date: 11/22/2019    Discharge Date and Time:  11/22/2019 8:23 AM    Hospital Course (synopsis of major diagnoses, care, treatment, and services provided during the course of the hospital stay): Home from PACU     Final Diagnosis: Post-Op Diagnosis Codes:     * Chronic pain of left knee [M25.562, G89.29]     * Medial meniscus tear [S83.249A]     * Lateral meniscus tear [S83.289A]     * Chondromalacia, left knee [M94.262]    Disposition: Home or Self Care    Follow Up/Patient Instructions:     Medications:  Reconciled Home Medications:      Medication List      CONTINUE taking these medications    aspirin 325 MG EC tablet  Commonly known as:  ECOTRIN  Take 1 tablet (325 mg total) by mouth 2 (two) times  "daily. for 21 days     atorvastatin 20 MG tablet  Commonly known as:  LIPITOR  TAKE ONE TABLET BY MOUTH EVERY DAY     famotidine 20 MG tablet  Commonly known as:  PEPCID  Take 20 mg by mouth once daily.     fish oil-omega-3 fatty acids 300-1,000 mg capsule  Take 2 g by mouth once daily.     fluticasone propionate 50 mcg/actuation nasal spray  Commonly known as:  FLONASE  1 spray by Each Nare route once daily.     HYDROcodone-acetaminophen  mg per tablet  Commonly known as:  NORCO  Take 1 tablet by mouth every 6 (six) hours as needed (POSt-OP DELIVER BEDSIDE AT [place]).     loratadine 10 mg Cap  Take by mouth.     metoprolol succinate 50 MG 24 hr tablet  Commonly known as:  TOPROL-XL  Take 1 tablet (50 mg total) by mouth once daily.     ondansetron 4 MG tablet  Commonly known as:  ZOFRAN  Take 1 tablet (4 mg total) by mouth every 8 (eight) hours as needed (TO BE DELIVERED BEDSIDE AT [place]).     typhoid DR capsule  Commonly known as:  VIVOTIF  Take 1 capsule by mouth every other day.     zolpidem 5 MG Tab  Commonly known as:  AMBIEN  Take 1 tablet (5 mg total) by mouth nightly as needed.          Discharge Procedure Orders   CRUTCHES FOR HOME USE     Order Specific Question Answer Comments   Type: Axillary    Height: 6' 1" (1.854 m)    Weight: 86.2 kg (190 lb)    Length of need (1-99 months): 99      Diet general     Call MD for:  temperature >100.4     Call MD for:  persistent nausea and vomiting     Call MD for:  severe uncontrolled pain     Call MD for:  difficulty breathing, headache or visual disturbances     Call MD for:  redness, tenderness, or signs of infection (pain, swelling, redness, odor or green/yellow discharge around incision site)     Call MD for:  hives     Call MD for:  persistent dizziness or light-headedness     Call MD for:  extreme fatigue     Follow-up Information     Follow up In 2 weeks.               "

## 2019-11-22 NOTE — TELEPHONE ENCOUNTER
----- Message from Deena Chaparro sent at 11/22/2019 10:49 AM CST -----  Contact: Patient  Attn: Bushra    Patient is returning you call, please call back.    [phone]

## 2019-11-25 ENCOUNTER — CLINICAL SUPPORT (OUTPATIENT)
Dept: REHABILITATION | Facility: HOSPITAL | Age: 69
End: 2019-11-25
Attending: ORTHOPAEDIC SURGERY
Payer: COMMERCIAL

## 2019-11-25 VITALS
DIASTOLIC BLOOD PRESSURE: 94 MMHG | RESPIRATION RATE: 18 BRPM | SYSTOLIC BLOOD PRESSURE: 156 MMHG | HEART RATE: 69 BPM | BODY MASS INDEX: 25.18 KG/M2 | TEMPERATURE: 98 F | HEIGHT: 73 IN | OXYGEN SATURATION: 98 % | WEIGHT: 190 LBS

## 2019-11-25 DIAGNOSIS — G89.29 CHRONIC PAIN OF LEFT KNEE: ICD-10-CM

## 2019-11-25 DIAGNOSIS — M25.562 CHRONIC PAIN OF LEFT KNEE: ICD-10-CM

## 2019-11-25 PROCEDURE — 97161 PT EVAL LOW COMPLEX 20 MIN: CPT

## 2019-11-25 PROCEDURE — 97110 THERAPEUTIC EXERCISES: CPT

## 2019-11-25 NOTE — PLAN OF CARE
[facility]  Physical Therapy Initial Evaluation    Name: Jacques Munoz  Clinic Number: [id number]    Therapy Diagnosis: No diagnosis found.  Physician: Bairon Salas MD    Physician Orders: 2 Eval and treat  Medical Diagnosis: Chronic pain left knee     Date of Surgery:11/22/19  Evaluation Date: 11/25/2019  Authorization Period Expiration: 12/31/19  Plan of Care Certification Period: 2/17/19  Visit # / Visits authorized: 1/ 1    Time In: 300pm  Time Out: 400pm  Total Billable Time: 60 minutes    Precautions: Standard    Subjective   Date of onset: 11/22/19  History of current condition - Dustin reports: chronic left knee pain that has progressed over the last few years.  Pt notes having difficulty at times with recreational exercises, but remains able to participate in strengthening type exercises.  Pt was seen by Dr. Salas and dx with meniscal pathology.  Pt presents 3 days post op left knee menisectomy medial and lateral meniscus.         Past Medical History:   Diagnosis Date    BPH (benign prostatic hyperplasia)     High cholesterol     Hypertension     Kidney stone      Jacques Munoz  has a past surgical history that includes Tonsillectomy; Knee arthroscopy w/ meniscectomy (Left, 11/22/2019); and Arthroscopic chondroplasty of knee joint (Left, 11/22/2019).    Jacques DALE has a current medication list which includes the following prescription(s): aspirin, atorvastatin, famotidine, fish oil-omega-3 fatty acids, fluticasone propionate, hydrocodone-acetaminophen, loratadine, metoprolol succinate, ondansetron, typhoid, and zolpidem.    Review of patient's allergies indicates:  No Known Allergies     Imaging, MRI studies:  1. Complex tear body segment/posterior horn lateral meniscus with predominant horizontal component that extends into the anterior horn.  2. Complex tear body segment/posterior horn medial meniscus with predominant horizontal component.  Anterior horn  demonstrates mucoid degeneration and degenerative free edge fraying.  3. Mild patellar and lateral tibiofemoral chondromalacia.  4. Moderate complex popliteal cyst.  5. Small distal femoral enchondroma.    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  Pain  WNL  Dim.  Pain    Shoulder Abduction (C5) x   x     Elbow Flex (C6) x        Wrist Ext (C7) x   x     Finger Flex (C8) x   x     Finger Abd (TI) x   x     Hip Flexion (L2-L3)  x   x     Knee Extension (L3-L4)  x   x     Dorsiflexion (L4)  x   x     Hallux Extension (L5)  x   x     Ankle Eversion (S1-S2)  x   x            Sensation:     Neurologicalc Screening- Sensory  Right    Left      LEVEL  WNL  Dim.  Absent  WNL  Dim.  Absent    L1 (inguinal area)  x   x     L2 (anterior mid-thigh)  x   x     L3 (distal anterior thigh)  x   x     L4 (medial lower leg/foot)  x   x     L5 (lateral leg/ foot)  x   x     S1 (lateral side of foot)  x   x                       Neurologicalc Screening- Sensory        LEVEL  WNL  Dim.  Absent  WNL  Dim.  Absent    C4 (Skin over AC jt) x   x     C5 (radial side of elbow) x   x     C6 (Dorsal surface of thumb) x   x     C7 (Dorsal 3rd digit) x   x     C8 (Dorsal 5th digit) x   x           ROM: Active/PROM            Knee ROM  right  left    flexion 130 90   extension 0 2     Left knee motion: 2-0-90          Strength:       Knee MMT  Right  Left    Flexion  5/5 4/5   Extension  5/5 4/5   Hip abduction  5/5 4/5   Hip Adduction  5/5  4/5   Calf raise  5/5 4/5   Bridge  5/5 4/5        Special Tests: Not indicated at this time.    GAIT: Normal  Squat test: n/a  Single leg squat:n/a  SFMA Results: n/a  Thoracic spine rotational mobility:n/a          CMS Impairment/Limitation/Restriction for FOTO 34 Survey    Therapist reviewed FOTO scores for Jacques Munoz on 11/25/2019.   FOTO documents entered into EPIC - see Media section.    Limitation Score: 66%  Category: Mobility             TREATMENT   Treatment Time In: 330  Treatment Time Out: 400pm  Total Treatment time separate from Evaluation time:30    Dustin received therapeutic exercises to develop strength, endurance, ROM and flexibility for 25 minutes including:  Bike- 1/2 revolutions 7 min   Quad sets 40x   Ankle pumps 40x   Seated SLR 15x   ROM 0-90          Dustin participated in gait training to improve functional mobility and safety for 8  minutes, including:  Gait train to focus on heel strike and knee extenion, also performed gait over osbaldo to promote good pattern.     Dustin received cold pack for 10 minutes to to decrease circulation, pain, and swelling.         Education provided re: yes     Written Home Exercises Provided: yes.  Exercises were reviewed and Dustin was able to demonstrate them prior to the end of the session.   Pt received a written copy of exercises to perform at home. Dustin demonstrated good  understanding of the education provided.     See EMR under patient instructions for exercises given.   Assessment   Jacques DALE is a 69 y.o. male referred to outpatient Physical Therapy with a medical diagnosis of chronic left knee pain and is not s/p left knee menisectomy x 3 days.  His port sites look good, some dried blood noted, steri strips remain, but overall no excess drainage was noted.  Pt does have normal post op edema which he has been icing with polar unit.  Pts ambulation was antalgic and stiff with little knee extension noted.  We did work on this, and he was advised on the  importance of knee extension. Pt does workout on a regular basis and was advised to limit LE strength training to PT only.  . Pt presents with left knee weakness, loss of motion, and pain that his hindering his functional mobility.     Pt prognosis is Excellent.   Pt will benefit from skilled outpatient Physical Therapy to address the deficits stated above and in the chart below, provide pt/family education, and to maximize pt's level of independence.     Plan of care discussed with patient: Yes  Pt's spiritual, cultural and educational needs considered and patient is agreeable to the plan of care and goals as stated below:     Anticipated Barriers for therapy: none     Medical Necessity is demonstrated by the following  History  Co-morbidities and personal factors that may impact the plan of care Co-morbidities:   HTN    Personal Factors:   no deficits     low   Examination  Body Structures and Functions, activity limitations and participation restrictions that may impact the plan of care Body Regions:   lower extremities    Body Systems:    gross symmetry  ROM  strength  balance  gait    Participation Restrictions:   none    Activity limitations:   Learning and applying knowledge  no deficits    General Tasks and Commands  no deficits    Communication  no deficits    Mobility  Difficulty driving, walking    Self care  no deficits    Domestic Life  Difficulty w/ household chores     Interactions/Relationships  no deficits    Life Areas  no deficits    Community and Social Life  no deficits         low   Clinical Presentation stable and uncomplicated low   Decision Making/ Complexity Score: low     Goals:  Short Term GOALS:  In 4-8 weeks, pt. will:  1. Pt will increase ROM to the left knee  to 0-0-120 in order to perform ADLs and IADLs more effectively   2. Decrease overall pain to left knee to 1-3/10 grossly throughout,  on average with daily activities   3. Increase strength to the left knee to 4+/5,  in order to  perform ADLs and IADLs more effectively   4. Improve FOTO intake score to 70 to demonstrate improvement with functional mobility and use  5. Independent with HEP  Long Term GOALS:  In 8-12 weeks, pt. will:  1. Pt will increase ROM to the left knee to WNL, in order to perform ADLs and IADLs more effectively   2. Decrease overall pain to 0-1/10,  on average with daily activities   3. Increase strength to the left knee to 5/5 grossly throughout,  in order to perform ADLs and IADLs more effectively   4. Improve FOTO intake score to 80 to demonstrate improvement with functional mobility and use  5. Independent with HEP  6. Return to full recreational exercise unrestricted       Plan   Certification Period/Plan of care expiration: 11/25/2019 to 2/17/20.    Outpatient Physical Therapy 3 times weekly for 10 weeks to include the following interventions: Aquatic Therapy, Gait Training, Manual Therapy, Moist Heat/ Ice, Neuromuscular Re-ed, Patient Education, Therapeutic Activites and Therapeutic Exercise.     Reno Domínguez, PT

## 2019-11-25 NOTE — TRANSFER OF CARE
"Anesthesia Transfer of Care Note    Patient: Jacques Munoz    Procedure(s) Performed: Procedure(s) (LRB):  ARTHROSCOPY, KNEE, WITH MENISCECTOMY (Left)  ARTHROSCOPY, KNEE, WITH CHONDROPLASTY (Left)    Patient location: PACU    Anesthesia Type: general    Transport from OR: Transported from OR on 6-10 L/min O2 by face mask with adequate spontaneous ventilation    Post pain: adequate analgesia    Post assessment: no apparent anesthetic complications    Post vital signs: stable    Level of consciousness: sedated    Nausea/Vomiting: no nausea/vomiting    Complications: none          Last vitals:   Visit Vitals  BP (!) 156/94   Pulse 69   Temp 36.7 °C (98 °F) (Oral)   Resp 18   Ht 6' 1" (1.854 m)   Wt 86.2 kg (190 lb)   SpO2 98%   BMI 25.07 kg/m²     "

## 2019-11-27 NOTE — ANESTHESIA POSTPROCEDURE EVALUATION
Anesthesia Post Evaluation    Patient: Jacques Munoz    Procedure(s) Performed: Procedure(s) (LRB):  ARTHROSCOPY, KNEE, WITH MENISCECTOMY (Left)  ARTHROSCOPY, KNEE, WITH CHONDROPLASTY (Left)    Final Anesthesia Type: general    Patient location during evaluation: PACU  Patient participation: Yes- Able to Participate  Level of consciousness: awake and alert and oriented  Post-procedure vital signs: reviewed and stable  Pain management: adequate  Airway patency: patent    PONV status at discharge: No PONV  Anesthetic complications: no      Cardiovascular status: hemodynamically stable  Respiratory status: unassisted, spontaneous ventilation and room air  Hydration status: euvolemic  Follow-up not needed.          Event Time     Out of Recovery 08:50:00          Pain/Clarence Score: No data recorded

## 2019-11-29 ENCOUNTER — CLINICAL SUPPORT (OUTPATIENT)
Dept: REHABILITATION | Facility: HOSPITAL | Age: 69
End: 2019-11-29
Attending: ORTHOPAEDIC SURGERY
Payer: COMMERCIAL

## 2019-11-29 DIAGNOSIS — M25.562 CHRONIC PAIN OF LEFT KNEE: ICD-10-CM

## 2019-11-29 DIAGNOSIS — G89.29 CHRONIC PAIN OF LEFT KNEE: ICD-10-CM

## 2019-11-29 PROCEDURE — 97110 THERAPEUTIC EXERCISES: CPT

## 2019-11-29 PROCEDURE — 97140 MANUAL THERAPY 1/> REGIONS: CPT

## 2019-11-29 NOTE — PROGRESS NOTES
"  Physical Therapy Daily Treatment Note     Name: Jacques Munoz  Clinic Number: [id number]    Therapy Diagnosis:   Encounter Diagnosis   Name Primary?    Chronic pain of left knee      Physician: Bairon Salas MD    Visit Date: 11/29/2019  Physician Orders: 2 Eval and treat  Medical Diagnosis: Chronic pain left knee   Date of Surgery: 11/22/19  Evaluation Date: 11/25/2019  Authorization Period Expiration: 12/31/19  Plan of Care Certification Period: 2/17/19  Visit # / Visits authorized: 1/ 12    Time In: 9:00 am  Time Out: 10:00 am  Total Billable Time: 60 minutes    Precautions: Standard    Subjective     Pt reports: he is doing well, and notices improvements every day.  He was compliant with home exercise program.  Response to previous treatment: no adverse effects  Functional change: none yet    Pain: 0/10  Location: left knee      Objective     Dustin received therapeutic exercises to develop strength, endurance, ROM and flexibility for 50 minutes including:  Bike- 8 min   Quad sets 40x   SAQ 2# 2x10  Seated SLR 3x10   Standing TKE green theraband 20x5"  Step up 6" x20   Sit to stand blue box 2x10  SLS on ground 2x30 sec  Tandem stance on ground 2x30 sec  Shuttle squat 37.5# 2x10    Dustin received the following manual therapy techniques: Joint mobilizations were applied to the: L knee for 10 minutes, including:  Patellar mobilizations in all directions, grade III/IV  Posterior femoral mobs, grade III/IV to improve extension     Dustin participated in gait training to improve functional mobility and safety for 8  minutes, including: -NP  Gait train to focus on heel strike and knee extenion, also performed gait over osbaldo to promote good pattern.      Home Exercises Provided and Patient Education Provided     Education provided:   - pathophysiology, expected healing times    Written Home Exercises Provided: Patient instructed to cont prior HEP.  Exercises were reviewed and Dustin was able to demonstrate them prior " to the end of the session.  Dustin demonstrated good  understanding of the education provided.     See EMR under Patient Instructions for exercises provided prior visit.    Assessment     Dustin tolerated treatment well with significant quad fatigue noted. Fair balance with single leg stance, with minimal use of hands occasionally. Extension lag with last set of SLR. Educated pt to continue focus on extension range and quad control with HEP.   Dustin is progressing well towards his goals.   Pt prognosis is Excellent.     Pt will continue to benefit from skilled outpatient physical therapy to address the deficits listed in the problem list box on initial evaluation, provide pt/family education and to maximize pt's level of independence in the home and community environment.     Pt's spiritual, cultural and educational needs considered and pt agreeable to plan of care and goals.    Anticipated barriers to physical therapy: none    Goals:   Short Term GOALS:  In 4-8 weeks, pt. will:  1. Pt will increase ROM to the left knee  to 0-0-120 in order to perform ADLs and IADLs more effectively (progressing, not met)  2. Decrease overall pain to left knee to 1-3/10 grossly throughout,  on average with daily activities (progressing, not met)  3. Increase strength to the left knee to 4+/5,  in order to perform ADLs and IADLs more effectively (progressing, not met)  4. Improve FOTO intake score to 70 to demonstrate improvement with functional mobility and use (progressing, not met)  5. Independent with HEP (progressing, not met)  Long Term GOALS:  In 8-12 weeks, pt. will:  1. Pt will increase ROM to the left knee to WNL, in order to perform ADLs and IADLs more effectively (progressing, not met)  2. Decrease overall pain to 0-1/10,  on average with daily activities (progressing, not met)  3. Increase strength to the left knee to 5/5 grossly throughout,  in order to perform ADLs and IADLs more effectively (progressing, not met)  4. Improve  FOTO intake score to 80 to demonstrate improvement with functional mobility and use (progressing, not met)  5. Independent with HEP (progressing, not met)  6. Return to full recreational exercise unrestricted (progressing, not met)    Plan     Continue per POC, addressing strength, ROM, and functional deficits as tolerated.     Lorie Schaeffer, PT

## 2019-12-02 ENCOUNTER — CLINICAL SUPPORT (OUTPATIENT)
Dept: REHABILITATION | Facility: HOSPITAL | Age: 69
End: 2019-12-02
Attending: ORTHOPAEDIC SURGERY
Payer: COMMERCIAL

## 2019-12-02 DIAGNOSIS — G89.29 CHRONIC PAIN OF LEFT KNEE: ICD-10-CM

## 2019-12-02 DIAGNOSIS — M25.562 CHRONIC PAIN OF LEFT KNEE: ICD-10-CM

## 2019-12-02 PROCEDURE — 97140 MANUAL THERAPY 1/> REGIONS: CPT

## 2019-12-02 PROCEDURE — 97110 THERAPEUTIC EXERCISES: CPT

## 2019-12-02 NOTE — PROGRESS NOTES
"  Physical Therapy Daily Treatment Note     Name: Jacques Munoz  Clinic Number: [id number]    Therapy Diagnosis:   Encounter Diagnosis   Name Primary?    Chronic pain of left knee      Physician: Bairon Salas MD    Visit Date: 12/2/2019  Physician Orders: 2 Eval and treat  Medical Diagnosis: Chronic pain left knee   Date of Surgery: 11/22/19  Evaluation Date: 11/25/2019  Authorization Period Expiration: 12/31/19  Plan of Care Certification Period: 2/17/19  Visit # / Visits authorized: 2/ 12    Time In: 9:00 am  Time Out: 10:00 am  Total Billable Time: 60 minutes    Precautions: Standard    Subjective     Pt reports: he had muscle soreness after last session, but is overall not having pain.  He was compliant with home exercise program.  Response to previous treatment: no adverse effects  Functional change: none yet    Pain: 0/10  Location: left knee      Objective     Dustin received therapeutic exercises to develop strength, endurance, ROM and flexibility for 42 minutes including:  Bike- 8 min   Quad sets 40x   SAQ 3# 2x10   Seated SLR 3x10   Standing TKE green theraband 20x5"- NP  Lateral step down 6" x20   Blue box squats 2x10  SLS on ground 2x30 sec  Tandem stance on ground 2x30 sec- NP  Shuttle squat 62.5# 3x10  SL shuttle squat 25# 3x10    Dustin received the following manual therapy techniques: Joint mobilizations were applied to the: L knee for 10 minutes, including:  Patellar mobilizations in all directions, grade III/IV  Posterior femoral mobs, grade III/IV to improve extension     Dustin participated in gait training to improve functional mobility and safety for 8  minutes, including:  Gait train to focus on heel strike and knee extenion, also performed gait over osbaldo to promote good pattern.      Home Exercises Provided and Patient Education Provided     Education provided:   - pathophysiology, expected healing times    Written Home Exercises Provided: Patient instructed to cont prior HEP.  Exercises " were reviewed and Dustin was able to demonstrate them prior to the end of the session.  Dustin demonstrated good  understanding of the education provided.     See EMR under Patient Instructions for exercises provided prior visit.    Assessment     Improved squat form today, but required cues initially to prevent hip shift to R. Tendency for L ankle eversion to clear hurdles, improved with repetitions. Able to reach passive hyperextension with manual therapy, but continues to have slight active extension lag. Will continue to promote full ROM and improved quad activation.   Dustin is progressing well towards his goals.   Pt prognosis is Excellent.     Pt will continue to benefit from skilled outpatient physical therapy to address the deficits listed in the problem list box on initial evaluation, provide pt/family education and to maximize pt's level of independence in the home and community environment.     Pt's spiritual, cultural and educational needs considered and pt agreeable to plan of care and goals.    Anticipated barriers to physical therapy: none    Goals:   Short Term GOALS:  In 4-8 weeks, pt. will:  1. Pt will increase ROM to the left knee  to 0-0-120 in order to perform ADLs and IADLs more effectively (progressing, not met)  2. Decrease overall pain to left knee to 1-3/10 grossly throughout,  on average with daily activities (progressing, not met)  3. Increase strength to the left knee to 4+/5,  in order to perform ADLs and IADLs more effectively (progressing, not met)  4. Improve FOTO intake score to 70 to demonstrate improvement with functional mobility and use (progressing, not met)  5. Independent with HEP (progressing, not met)  Long Term GOALS:  In 8-12 weeks, pt. will:  1. Pt will increase ROM to the left knee to WNL, in order to perform ADLs and IADLs more effectively (progressing, not met)  2. Decrease overall pain to 0-1/10,  on average with daily activities (progressing, not met)  3. Increase strength  to the left knee to 5/5 grossly throughout,  in order to perform ADLs and IADLs more effectively (progressing, not met)  4. Improve FOTO intake score to 80 to demonstrate improvement with functional mobility and use (progressing, not met)  5. Independent with HEP (progressing, not met)  6. Return to full recreational exercise unrestricted (progressing, not met)    Plan     Continue per POC, addressing strength, ROM, and functional deficits as tolerated.     Lorie Schaeffer, PT

## 2019-12-05 ENCOUNTER — OFFICE VISIT (OUTPATIENT)
Dept: SPORTS MEDICINE | Facility: CLINIC | Age: 69
End: 2019-12-05
Payer: COMMERCIAL

## 2019-12-05 VITALS
HEIGHT: 73 IN | DIASTOLIC BLOOD PRESSURE: 93 MMHG | HEART RATE: 69 BPM | WEIGHT: 190 LBS | BODY MASS INDEX: 25.18 KG/M2 | SYSTOLIC BLOOD PRESSURE: 136 MMHG

## 2019-12-05 DIAGNOSIS — Z98.890 S/P ARTHROSCOPIC SURGERY OF LEFT KNEE: Primary | ICD-10-CM

## 2019-12-05 PROCEDURE — 99999 PR PBB SHADOW E&M-EST. PATIENT-LVL III: CPT | Mod: PBBFAC,,, | Performed by: PHYSICIAN ASSISTANT

## 2019-12-05 PROCEDURE — 99024 PR POST-OP FOLLOW-UP VISIT: ICD-10-PCS | Mod: S$GLB,,, | Performed by: PHYSICIAN ASSISTANT

## 2019-12-05 PROCEDURE — 99024 POSTOP FOLLOW-UP VISIT: CPT | Mod: S$GLB,,, | Performed by: PHYSICIAN ASSISTANT

## 2019-12-05 PROCEDURE — 99999 PR PBB SHADOW E&M-EST. PATIENT-LVL III: ICD-10-PCS | Mod: PBBFAC,,, | Performed by: PHYSICIAN ASSISTANT

## 2019-12-05 NOTE — PROGRESS NOTES
"CC: Left knee scope post op 2 weeks    Patient returns to clinic for his 2 week post op S/P left knee arthroscopic menisectomy. He is doing PT here at [facility] and is progressing well. He is no longer taking pain medication. Denies pain, but endorses some left knee swelling and stiffness. Denies any fevers, chills, nausea, vomiting, chest pain, or SOB.     Date of Procedure: 11/22/2019     Procedure: Procedure(s) (LRB):  ARTHROSCOPY, KNEE, WITH MENISCECTOMY (Left)  ARTHROSCOPY, KNEE, WITH CHONDROPLASTY (Left)      Pre-Operative Diagnosis: Chronic pain of left knee [M25.562, G89.29]  Medial meniscus tear [S83.249A]  Lateral meniscus tear [S83.289A]  Chondromalacia, left knee [M94.262]     Post-Operative Diagnosis: Post-Op Diagnosis Codes:     * Chronic pain of left knee [M25.562, G89.29]     * Medial meniscus tear [S83.249A]     * Lateral meniscus tear [S83.289A]     * Chondromalacia, left knee [M94.262]      Physical Exam:  Constitutional:  Well-developed, well-nourished. In no apparent distress  HENT:  Normocephalic, atraumatic  Eyes:  PERRL, EOM normal, conjunctivae normal  Neck:  Supple, ROM normal  Cardiovascular:  Intact distal pulses bilaterally, Regular rate by palpation of distal pulse, normal color and temperature. No concerning varicosities on affected side. Capillary refill < 2 seconds.  Pulmonary:  Effort normal, no respiratory distress, no wheezing appreciated  Neurological:  Alert and oriented x3, no sensory deficits to affected side, normal coordination  Skin:  Warm, dry, without rash or erythema  Psychiatric:  Mood and affect normal, behavior normal, linear thought content, appropriate judgement      BP (!) 136/93   Pulse 69   Ht 6' 1" (1.854 m)   Wt 86.2 kg (190 lb)   BMI 25.07 kg/m²      Left knee:    Incision clean/dry/intact  No sign of infection  Mild swelling  Compartments soft  Neurovascular status intact in extremity    FROM  Decreased quad strength  Mild effusion    Assessment:  2 weeks " s/p left knee scope    Plan:  1.  Removed steri strips.  Wounds healing well    2.  Arthroscopic pictures reviewed and rehab plans discussed.    3.  Physical therapy for quad, ROM, modalities.  HEP for ROM, knee, VMO and hip abductor strengthening.     4.  Pain level acceptable for first post op visit.  Wean off pain medicine by next visit if still taking    5. Mild effusion to left knee which required aspiration. Patient was prepped in a sterile fashion with chlorhexidine and received 6 cc lidocaine for local anesthesia. 25cc of serosanguinous fluid drained from knee. Patient tolerated procedure well with no adverse events.     6. Return to clinic in 4 weeks at 6 weeks post-op.

## 2019-12-06 ENCOUNTER — CLINICAL SUPPORT (OUTPATIENT)
Dept: REHABILITATION | Facility: HOSPITAL | Age: 69
End: 2019-12-06
Attending: ORTHOPAEDIC SURGERY
Payer: COMMERCIAL

## 2019-12-06 DIAGNOSIS — G89.29 CHRONIC PAIN OF LEFT KNEE: ICD-10-CM

## 2019-12-06 DIAGNOSIS — M25.562 CHRONIC PAIN OF LEFT KNEE: ICD-10-CM

## 2019-12-06 PROCEDURE — 97110 THERAPEUTIC EXERCISES: CPT

## 2019-12-06 PROCEDURE — 97140 MANUAL THERAPY 1/> REGIONS: CPT

## 2019-12-06 NOTE — PROGRESS NOTES
"  Physical Therapy Daily Treatment Note     Name: Jacques Munoz  Clinic Number: [id number]    Therapy Diagnosis:   Encounter Diagnosis   Name Primary?    Chronic pain of left knee      Physician: Bairon Salas MD    Visit Date: 12/6/2019  Physician Orders: 2 Eval and treat  Medical Diagnosis: Chronic pain left knee   Date of Surgery: 11/22/19  Evaluation Date: 11/25/2019  Authorization Period Expiration: 12/31/19  Plan of Care Certification Period: 2/17/19  Visit # / Visits authorized: 4/ 12    Time In: 9:00 am  Time Out: 10:00 am  Total Billable Time: 60 minutes    Precautions: Standard    Subjective     Pt reports: he saw the doctor yesterday, who drained 25cc of fluid off his knee. He feels better from that, and overall feels his progress is going well.   He was compliant with home exercise program.  Response to previous treatment: no adverse effects  Functional change: none yet    Pain: 0/10  Location: left knee      Objective   ROM: 0/0/110    Dustin received therapeutic exercises to develop strength, endurance, ROM and flexibility for 45 minutes including:  Bike- 8 min   Quad sets 40x -NP  Seated calf stretch 3x30 sec  SAQ 5# 3x10   Seated SLR 3x10 3#  Standing TKE silver theraband 20x5"  Lateral step down 6" x20 -NP  Step up 12" box 2x10 each  SL calf raise to fatigue x1 set each  Blue box squats 2x10- NP  SLS on ground 2x30 sec- NP  Tandem stance on ground 2x30 sec- NP  Shuttle squat 75# 3x10  SL shuttle squat 32.5# 3x10  Incline calf stretch 3x30 sec    Dustin received the following manual therapy techniques: Joint mobilizations were applied to the: L knee for 15 minutes, including:  Patellar mobilizations in all directions, grade III/IV  Posterior femoral mobs, grade III/IV to improve extension     Dustin participated in gait training to improve functional mobility and safety for 8  minutes, including:- NP  Gait train to focus on heel strike and knee extenion, also performed gait over osbaldo to promote " good pattern.      Home Exercises Provided and Patient Education Provided     Education provided:   - pathophysiology, expected healing times    Written Home Exercises Provided: Patient instructed to cont prior HEP.  Exercises were reviewed and Dustin was able to demonstrate them prior to the end of the session.  Dustin demonstrated good  understanding of the education provided.     See EMR under Patient Instructions for exercises provided prior visit.    Assessment     Posterior LE muscular tightness, so initiated calf and hamstring stretching to alleviate this. Decreased active knee extension with squats. Improved quad control with repetition of single leg squats and step ups.   Dustin is progressing well towards his goals.   Pt prognosis is Excellent.     Pt will continue to benefit from skilled outpatient physical therapy to address the deficits listed in the problem list box on initial evaluation, provide pt/family education and to maximize pt's level of independence in the home and community environment.     Pt's spiritual, cultural and educational needs considered and pt agreeable to plan of care and goals.    Anticipated barriers to physical therapy: none    Goals:   Short Term GOALS:  In 4-8 weeks, pt. will:  1. Pt will increase ROM to the left knee  to 0-0-120 in order to perform ADLs and IADLs more effectively (progressing, not met)  2. Decrease overall pain to left knee to 1-3/10 grossly throughout,  on average with daily activities (progressing, not met)  3. Increase strength to the left knee to 4+/5,  in order to perform ADLs and IADLs more effectively (progressing, not met)  4. Improve FOTO intake score to 70 to demonstrate improvement with functional mobility and use (progressing, not met)  5. Independent with HEP (progressing, not met)  Long Term GOALS:  In 8-12 weeks, pt. will:  1. Pt will increase ROM to the left knee to WNL, in order to perform ADLs and IADLs more effectively (progressing, not met)  2.  Decrease overall pain to 0-1/10,  on average with daily activities (progressing, not met)  3. Increase strength to the left knee to 5/5 grossly throughout,  in order to perform ADLs and IADLs more effectively (progressing, not met)  4. Improve FOTO intake score to 80 to demonstrate improvement with functional mobility and use (progressing, not met)  5. Independent with HEP (progressing, not met)  6. Return to full recreational exercise unrestricted (progressing, not met)    Plan     Continue per POC, addressing strength, ROM, and functional deficits as tolerated.     Lorie Schaeffer, PT

## 2019-12-09 ENCOUNTER — CLINICAL SUPPORT (OUTPATIENT)
Dept: REHABILITATION | Facility: HOSPITAL | Age: 69
End: 2019-12-09
Attending: ORTHOPAEDIC SURGERY
Payer: COMMERCIAL

## 2019-12-09 DIAGNOSIS — G89.29 CHRONIC PAIN OF LEFT KNEE: ICD-10-CM

## 2019-12-09 DIAGNOSIS — M25.562 CHRONIC PAIN OF LEFT KNEE: ICD-10-CM

## 2019-12-09 PROCEDURE — 97140 MANUAL THERAPY 1/> REGIONS: CPT

## 2019-12-09 PROCEDURE — 97110 THERAPEUTIC EXERCISES: CPT

## 2019-12-13 ENCOUNTER — CLINICAL SUPPORT (OUTPATIENT)
Dept: REHABILITATION | Facility: HOSPITAL | Age: 69
End: 2019-12-13
Attending: ORTHOPAEDIC SURGERY
Payer: COMMERCIAL

## 2019-12-13 DIAGNOSIS — M25.562 CHRONIC PAIN OF LEFT KNEE: ICD-10-CM

## 2019-12-13 DIAGNOSIS — G89.29 CHRONIC PAIN OF LEFT KNEE: ICD-10-CM

## 2019-12-13 PROCEDURE — 97110 THERAPEUTIC EXERCISES: CPT

## 2019-12-13 NOTE — PROGRESS NOTES
"  Physical Therapy Daily Treatment Note     Name: Jacques Munoz  Clinic Number: [id number]    Therapy Diagnosis:   Encounter Diagnosis   Name Primary?    Chronic pain of left knee      Physician: Bairon Salas MD    Visit Date: 12/13/2019  Physician Orders: 2 Eval and treat  Medical Diagnosis: Chronic pain left knee   Date of Surgery: 11/22/19  Evaluation Date: 11/25/2019  Authorization Period Expiration: 12/31/19  Plan of Care Certification Period: 2/17/19  Visit # / Visits authorized: 6/ 12    Time In: 9:00 am  Time Out: 10:00 am  Total Billable Time: 30 minutes    Precautions: Standard    Subjective     Pt reports: he had muscle soreness after last session, but no pain, He will be more aggressive with his knee extension self mobs this weekend.   He was compliant with home exercise program.  Response to previous treatment: no adverse effects  Functional change: none yet    Pain: 0/10  Location: left knee      Objective   ROM: 0/0/110    Dustin received therapeutic exercises to develop strength, endurance, ROM and flexibility for 60 minutes including:  Bike- 8 min   Standing TKE yellow theraband 30x5"  Lateral step down 6" x20 with nuetral talar position and orange theraband  Step up 12" box 3x8 each  SL blue box squat 3x8 with UE support  DL red box squat 3x10  Lateral band walking green theraband around ankles 3x50'  Neutral talar positioning green theraband around arches 20x5"  Neutral talar positioning with BW squat x20 green theraband     Dustin received the following manual therapy techniques: Joint mobilizations were applied to the: L knee for 15 minutes, including:- NP  Patellar mobilizations in all directions, grade III/IV  Posterior femoral mobs, grade III/IV to improve extension   Lateral talar glides, grade III/IV    Dustin participated in gait training to improve functional mobility and safety for 8  minutes, including:- NP  Gait train to focus on heel strike and knee extenion, also performed gait " "over osbaldo to promote good pattern.     Not performed:   SL calf raise to fatigue x1 set each  Blue box squats 3x10  Shuttle squat 75# 3x10  SL shuttle squat 32.5# 3x10  Incline calf stretch 3x30 sec- NP   SLS on ground 2x30 sec- NP  Tandem stance on ground 2x30 sec  Seated calf stretch 3x30 sec- NP  Self knee extension mob with strap 10x5"- NP  SAQ 5# 3x10 -NP  Seated SLR 3x10 3#- NP     Home Exercises Provided and Patient Education Provided     Education provided:   - pathophysiology, expected healing times    Written Home Exercises Provided: Patient instructed to cont prior HEP.  Exercises were reviewed and Dustin was able to demonstrate them prior to the end of the session.  Dustin demonstrated good  understanding of the education provided.     See EMR under Patient Instructions for exercises provided prior visit.    Assessment     Increased focus on ankle and arch positioning to improve dynamic posture. Improved single leg control overall, but continues to have deficits on L compared to R.   Dustin is progressing well towards his goals.   Pt prognosis is Excellent.     Pt will continue to benefit from skilled outpatient physical therapy to address the deficits listed in the problem list box on initial evaluation, provide pt/family education and to maximize pt's level of independence in the home and community environment.     Pt's spiritual, cultural and educational needs considered and pt agreeable to plan of care and goals.    Anticipated barriers to physical therapy: none    Goals:   Short Term GOALS:  In 4-8 weeks, pt. will:  1. Pt will increase ROM to the left knee  to 0-0-120 in order to perform ADLs and IADLs more effectively (progressing, not met)  2. Decrease overall pain to left knee to 1-3/10 grossly throughout,  on average with daily activities (progressing, not met)  3. Increase strength to the left knee to 4+/5,  in order to perform ADLs and IADLs more effectively (progressing, not met)  4. Improve FOTO " intake score to 70 to demonstrate improvement with functional mobility and use (progressing, not met)  5. Independent with HEP (progressing, not met)  Long Term GOALS:  In 8-12 weeks, pt. will:  1. Pt will increase ROM to the left knee to WNL, in order to perform ADLs and IADLs more effectively (progressing, not met)  2. Decrease overall pain to 0-1/10,  on average with daily activities (progressing, not met)  3. Increase strength to the left knee to 5/5 grossly throughout,  in order to perform ADLs and IADLs more effectively (progressing, not met)  4. Improve FOTO intake score to 80 to demonstrate improvement with functional mobility and use (progressing, not met)  5. Independent with HEP (progressing, not met)  6. Return to full recreational exercise unrestricted (progressing, not met)    Plan     Continue per POC, addressing strength, ROM, and functional deficits as tolerated.     Lorie Schaeffer, PT

## 2019-12-16 ENCOUNTER — CLINICAL SUPPORT (OUTPATIENT)
Dept: REHABILITATION | Facility: HOSPITAL | Age: 69
End: 2019-12-16
Attending: ORTHOPAEDIC SURGERY
Payer: COMMERCIAL

## 2019-12-16 DIAGNOSIS — M25.562 CHRONIC PAIN OF LEFT KNEE: ICD-10-CM

## 2019-12-16 DIAGNOSIS — G89.29 CHRONIC PAIN OF LEFT KNEE: ICD-10-CM

## 2019-12-16 PROCEDURE — 97110 THERAPEUTIC EXERCISES: CPT

## 2019-12-16 PROCEDURE — 97140 MANUAL THERAPY 1/> REGIONS: CPT

## 2019-12-16 NOTE — PROGRESS NOTES
"  Physical Therapy Daily Treatment Note     Name: Jacques Munoz  Clinic Number: [id number]    Therapy Diagnosis:   Encounter Diagnosis   Name Primary?    Chronic pain of left knee      Physician: Bairon Salas MD    Visit Date: 12/16/2019  Physician Orders: 2 Eval and treat  Medical Diagnosis: Chronic pain left knee   Date of Surgery: 11/22/19  Evaluation Date: 11/25/2019  Authorization Period Expiration: 12/31/19  Plan of Care Certification Period: 2/17/19  Visit # / Visits authorized: 7/ 12    Time In: 9:00 am  Time Out: 9:55 am  Total Billable Time: 55 minutes    Precautions: Standard    Subjective     Pt reports: he was able to ride his bike this weekend without any issues.   He was compliant with home exercise program.  Response to previous treatment: no adverse effects  Functional change: none yet    Pain: 0/10  Location: left knee      Objective   ROM: 0/0/110    Dustin received therapeutic exercises to develop strength, endurance, ROM and flexibility for 45 minutes including:  Bike- 8 min   Standing TKE yellow theraband 30x5"  Lateral step down 6" x20 with nuetral talar position and orange theraband- NP  Step up 12" box 3x8 each  SL blue box squat 3x8 with UE support  DL red box squat 3x10 15#  3-way slider squat 2x10 each  Standing hamstring curl 3x10 each 3#  Lateral band walking green theraband around ankles 3x50'- NP  Neutral talar positioning green theraband around arches 20x5"- NP  Neutral talar positioning with BW squat x20 green theraband -NP    Dustin received the following manual therapy techniques: Joint mobilizations were applied to the: L knee for 10 minutes, including:  Patellar mobilizations in all directions, grade III/IV  Posterior femoral mobs, grade III/IV to improve extension   Lateral talar glides, grade III/IV- NP    Dustin participated in gait training to improve functional mobility and safety for 8  minutes, including:- NP  Gait train to focus on heel strike and knee extenion, also " "performed gait over osbaldo to promote good pattern.     Not performed:   SL calf raise to fatigue x1 set each  Blue box squats 3x10  Shuttle squat 75# 3x10  SL shuttle squat 32.5# 3x10  Incline calf stretch 3x30 sec- NP   SLS on ground 2x30 sec- NP  Tandem stance on ground 2x30 sec  Seated calf stretch 3x30 sec- NP  Self knee extension mob with strap 10x5"- NP  SAQ 5# 3x10 -NP  Seated SLR 3x10 3#- NP     Home Exercises Provided and Patient Education Provided     Education provided:   - pathophysiology, expected healing times    Written Home Exercises Provided: Patient instructed to cont prior HEP.  Exercises were reviewed and Dustin was able to demonstrate them prior to the end of the session.  Dustin demonstrated good  understanding of the education provided.     See EMR under Patient Instructions for exercises provided prior visit.    Assessment     Continues to require cues for improved ankle control and positioning. Improved single leg control and strength. Will continue to progress as tolerated.   Dustin is progressing well towards his goals.   Pt prognosis is Excellent.     Pt will continue to benefit from skilled outpatient physical therapy to address the deficits listed in the problem list box on initial evaluation, provide pt/family education and to maximize pt's level of independence in the home and community environment.     Pt's spiritual, cultural and educational needs considered and pt agreeable to plan of care and goals.    Anticipated barriers to physical therapy: none    Goals:   Short Term GOALS:  In 4-8 weeks, pt. will:  1. Pt will increase ROM to the left knee  to 0-0-120 in order to perform ADLs and IADLs more effectively (progressing, not met)  2. Decrease overall pain to left knee to 1-3/10 grossly throughout,  on average with daily activities (progressing, not met)  3. Increase strength to the left knee to 4+/5,  in order to perform ADLs and IADLs more effectively (progressing, not met)  4. Improve " FOTO intake score to 70 to demonstrate improvement with functional mobility and use (progressing, not met)  5. Independent with HEP (progressing, not met)  Long Term GOALS:  In 8-12 weeks, pt. will:  1. Pt will increase ROM to the left knee to WNL, in order to perform ADLs and IADLs more effectively (progressing, not met)  2. Decrease overall pain to 0-1/10,  on average with daily activities (progressing, not met)  3. Increase strength to the left knee to 5/5 grossly throughout,  in order to perform ADLs and IADLs more effectively (progressing, not met)  4. Improve FOTO intake score to 80 to demonstrate improvement with functional mobility and use (progressing, not met)  5. Independent with HEP (progressing, not met)  6. Return to full recreational exercise unrestricted (progressing, not met)    Plan     Continue per POC, addressing strength, ROM, and functional deficits as tolerated.     Lorie Schaeffer, PT

## 2019-12-20 ENCOUNTER — CLINICAL SUPPORT (OUTPATIENT)
Dept: REHABILITATION | Facility: HOSPITAL | Age: 69
End: 2019-12-20
Attending: ORTHOPAEDIC SURGERY
Payer: COMMERCIAL

## 2019-12-20 DIAGNOSIS — M25.562 CHRONIC PAIN OF LEFT KNEE: ICD-10-CM

## 2019-12-20 DIAGNOSIS — G89.29 CHRONIC PAIN OF LEFT KNEE: ICD-10-CM

## 2019-12-20 PROCEDURE — 97140 MANUAL THERAPY 1/> REGIONS: CPT

## 2019-12-20 PROCEDURE — 97110 THERAPEUTIC EXERCISES: CPT

## 2019-12-20 NOTE — PROGRESS NOTES
"  Physical Therapy Daily Treatment Note     Name: Jacques Munoz  Clinic Number: [id number]    Therapy Diagnosis:   Encounter Diagnosis   Name Primary?    Chronic pain of left knee      Physician: Bairon Salas MD    Visit Date: 12/20/2019  Physician Orders: 2 Eval and treat  Medical Diagnosis: Chronic pain left knee   Date of Surgery: 11/22/19  Evaluation Date: 11/25/2019  Authorization Period Expiration: 12/31/19  Plan of Care Certification Period: 2/17/19  Visit # / Visits authorized: 7/ 12    Time In: 10:05 am  Time Out: 11:00 am  Total Billable Time: 55 minutes    Precautions: Standard    Subjective     Pt reports: he still feels stiff in the mornings.   He was compliant with home exercise program.  Response to previous treatment: no adverse effects  Functional change: none yet    Pain: 0/10  Location: left knee      Objective   ROM: 0/0/110    Dustin received therapeutic exercises to develop strength, endurance, ROM and flexibility for 45 minutes including:  Bike- 8 min -NP  Quad set heel raise 30x5"  Standing TKE yellow theraband 30x5"  Child's pose stretch 10x10"  Lateral step down 6" x20 with nuetral talar position and orange theraband- NP  Step up 12" box 3x8 each  SL blue box squat 3x8 with UE support  DL red box squat 3x10 15#  3-way slider squat 2x10 each  Standing hamstring curl 3x10 each 3#  Lateral band walking green theraband around ankles 3x50'- NP  Neutral talar positioning green theraband around arches 20x5"- NP  Neutral talar positioning with BW squat x20 green theraband  Stool scoots 3x50'     Dustin received the following manual therapy techniques: Joint mobilizations were applied to the: L knee for 10 minutes, including:  Patellar mobilizations in all directions, grade III/IV  Posterior femoral mobs, grade III/IV to improve extension   Lateral talar glides, grade III/IV- NP    Dustin participated in gait training to improve functional mobility and safety for 8  minutes, including:- NP  Gait " "train to focus on heel strike and knee extenion, also performed gait over osbaldo to promote good pattern.     Not performed:   SL calf raise to fatigue x1 set each  Blue box squats 3x10  Shuttle squat 75# 3x10  SL shuttle squat 32.5# 3x10  Incline calf stretch 3x30 sec- NP   SLS on ground 2x30 sec- NP  Tandem stance on ground 2x30 sec  Seated calf stretch 3x30 sec- NP  Self knee extension mob with strap 10x5"- NP  SAQ 5# 3x10 -NP  Seated SLR 3x10 3#- NP     Home Exercises Provided and Patient Education Provided     Education provided:   - pathophysiology, expected healing times    Written Home Exercises Provided: Patient instructed to cont prior HEP.  Exercises were reviewed and Dustin was able to demonstrate them prior to the end of the session.  Dustin demonstrated good  understanding of the education provided.     See EMR under Patient Instructions for exercises provided prior visit.    Assessment     Reports improved tolerance to knee flexion with distraction force. Demonstrates improved single leg control with step ups and step downs, but continues to require cueing for posterior weight shift.   Dustin is progressing well towards his goals.   Pt prognosis is Excellent.     Pt will continue to benefit from skilled outpatient physical therapy to address the deficits listed in the problem list box on initial evaluation, provide pt/family education and to maximize pt's level of independence in the home and community environment.     Pt's spiritual, cultural and educational needs considered and pt agreeable to plan of care and goals.    Anticipated barriers to physical therapy: none    Goals:   Short Term GOALS:  In 4-8 weeks, pt. will:  1. Pt will increase ROM to the left knee  to 0-0-120 in order to perform ADLs and IADLs more effectively (progressing, not met)  2. Decrease overall pain to left knee to 1-3/10 grossly throughout,  on average with daily activities (progressing, not met)  3. Increase strength to the left knee " to 4+/5,  in order to perform ADLs and IADLs more effectively (progressing, not met)  4. Improve FOTO intake score to 70 to demonstrate improvement with functional mobility and use (progressing, not met)  5. Independent with HEP (progressing, not met)  Long Term GOALS:  In 8-12 weeks, pt. will:  1. Pt will increase ROM to the left knee to WNL, in order to perform ADLs and IADLs more effectively (progressing, not met)  2. Decrease overall pain to 0-1/10,  on average with daily activities (progressing, not met)  3. Increase strength to the left knee to 5/5 grossly throughout,  in order to perform ADLs and IADLs more effectively (progressing, not met)  4. Improve FOTO intake score to 80 to demonstrate improvement with functional mobility and use (progressing, not met)  5. Independent with HEP (progressing, not met)  6. Return to full recreational exercise unrestricted (progressing, not met)    Plan     Continue per POC, addressing strength, ROM, and functional deficits as tolerated.     Lorie Schaeffer, PT

## 2019-12-23 ENCOUNTER — CLINICAL SUPPORT (OUTPATIENT)
Dept: REHABILITATION | Facility: HOSPITAL | Age: 69
End: 2019-12-23
Attending: ORTHOPAEDIC SURGERY
Payer: COMMERCIAL

## 2019-12-23 DIAGNOSIS — M25.562 CHRONIC PAIN OF LEFT KNEE: ICD-10-CM

## 2019-12-23 DIAGNOSIS — G89.29 CHRONIC PAIN OF LEFT KNEE: ICD-10-CM

## 2019-12-23 PROCEDURE — 97110 THERAPEUTIC EXERCISES: CPT

## 2019-12-23 PROCEDURE — 97140 MANUAL THERAPY 1/> REGIONS: CPT

## 2019-12-23 NOTE — PROGRESS NOTES
"  Physical Therapy Daily Treatment Note     Name: Jacques Munoz  Clinic Number: [id number]    Therapy Diagnosis:   Encounter Diagnosis   Name Primary?    Chronic pain of left knee      Physician: Bairon Salas MD    Visit Date: 12/23/2019  Physician Orders: 2 Eval and treat  Medical Diagnosis: Chronic pain left knee   Date of Surgery: 11/22/19  Evaluation Date: 11/25/2019  Authorization Period Expiration: 12/31/19  Plan of Care Certification Period: 2/17/19  Visit # / Visits authorized: 8/ 12    Time In: 1302  Time Out: 1405   Total Billable Time: 33 minutes    Precautions: Standard    Subjective     Pt states feeling well w/ no c/o pn in L knee.  Pt mentioned performing a 2 mile bike ride today w/ no c/o pn.    He was compliant with home exercise program.  Response to previous treatment: no adverse effects  Functional change: improved movement and endurance    Pain: 0/10  Location: left knee      Objective       Dustin received therapeutic exercises to develop strength, endurance, ROM and flexibility for 53 minutes (23 min 1 on 1 ) including:    Bike 5 min to increase blood flow   Child's pose stretch 10x10"  Standing TKE yellow theraband 30x5"  Stool scoots 3x50'   b HR 30 x 3 sec hold   Step up 12" box 3x8 each  SL blue box squat 3x8 with UE support  3-way slider squat 2x10 each    Not performed today:   Quad set heel raise 30x5"  Neutral talar positioning with BW squat x20 green theraband  Lateral step down 6" x20 with nuetral talar position and orange theraband- NP  DL red box squat 3x10 15#  Standing hamstring curl 3x10 each 3#  Lateral band walking green theraband around ankles 3x50'- NP  Neutral talar positioning green theraband around arches 20x5"- NP      Dustin received the following manual therapy techniques: Joint mobilizations were applied to the: L knee for 10 minutes, including:  Patellar mobilizations in all directions, grade III/IV  Posterior femoral mobs, grade III/IV to improve extension " "      Dustin participated in gait training to improve functional mobility and safety for 8  minutes, including:- NP  Gait train to focus on heel strike and knee extenion, also performed gait over osbaldo to promote good pattern.     Not performed:   SL calf raise to fatigue x1 set each  Blue box squats 3x10  Shuttle squat 75# 3x10  SL shuttle squat 32.5# 3x10  Incline calf stretch 3x30 sec- NP   SLS on ground 2x30 sec- NP  Tandem stance on ground 2x30 sec  Seated calf stretch 3x30 sec- NP  Self knee extension mob with strap 10x5"- NP  SAQ 5# 3x10 -NP  Seated SLR 3x10 3#- NP     Home Exercises Provided and Patient Education Provided     Education provided:   - pt edu on proper exercise technique.      Written Home Exercises Provided: Patient instructed to cont prior HEP.  Exercises were reviewed and Dustin was able to demonstrate them prior to the end of the session.  Dustin demonstrated good  understanding of the education provided.     See EMR under Patient Instructions for exercises provided prior visit.    Assessment   Pt had no adverse effects from tx.  Pt showed increased strength and endurance during therex.  Pt cont to lack some knee ext in [facility] activities and quad strength.      Dustin is progressing well towards his goals.   Pt prognosis is Excellent.     Pt will continue to benefit from skilled outpatient physical therapy to address the deficits listed in the problem list box on initial evaluation, provide pt/family education and to maximize pt's level of independence in the home and community environment.     Pt's spiritual, cultural and educational needs considered and pt agreeable to plan of care and goals.    Anticipated barriers to physical therapy: none    Goals:   Short Term GOALS:  In 4-8 weeks, pt. will:  1. Pt will increase ROM to the left knee  to 0-0-120 in order to perform ADLs and IADLs more effectively (progressing, not met)  2. Decrease overall pain to left knee to 1-3/10 grossly throughout,  on average " with daily activities (progressing, not met)  3. Increase strength to the left knee to 4+/5,  in order to perform ADLs and IADLs more effectively (progressing, not met)  4. Improve FOTO intake score to 70 to demonstrate improvement with functional mobility and use (progressing, not met)  5. Independent with HEP (progressing, not met)  Long Term GOALS:  In 8-12 weeks, pt. will:  1. Pt will increase ROM to the left knee to WNL, in order to perform ADLs and IADLs more effectively (progressing, not met)  2. Decrease overall pain to 0-1/10,  on average with daily activities (progressing, not met)  3. Increase strength to the left knee to 5/5 grossly throughout,  in order to perform ADLs and IADLs more effectively (progressing, not met)  4. Improve FOTO intake score to 80 to demonstrate improvement with functional mobility and use (progressing, not met)  5. Independent with HEP (progressing, not met)  6. Return to full recreational exercise unrestricted (progressing, not met)    Plan     Cont to progress towards goals set by PT.  Wok to increase ROM and quad strength next visit.      Denis Quan, PTA

## 2019-12-27 ENCOUNTER — CLINICAL SUPPORT (OUTPATIENT)
Dept: REHABILITATION | Facility: HOSPITAL | Age: 69
End: 2019-12-27
Attending: ORTHOPAEDIC SURGERY
Payer: COMMERCIAL

## 2019-12-27 DIAGNOSIS — G89.29 CHRONIC PAIN OF LEFT KNEE: ICD-10-CM

## 2019-12-27 DIAGNOSIS — M25.562 CHRONIC PAIN OF LEFT KNEE: ICD-10-CM

## 2019-12-27 PROCEDURE — 97140 MANUAL THERAPY 1/> REGIONS: CPT

## 2019-12-27 PROCEDURE — 97110 THERAPEUTIC EXERCISES: CPT

## 2019-12-30 ENCOUNTER — OFFICE VISIT (OUTPATIENT)
Dept: SPORTS MEDICINE | Facility: CLINIC | Age: 69
End: 2019-12-30
Payer: COMMERCIAL

## 2019-12-30 ENCOUNTER — CLINICAL SUPPORT (OUTPATIENT)
Dept: CARDIOLOGY | Facility: CLINIC | Age: 69
End: 2019-12-30
Attending: ORTHOPAEDIC SURGERY
Payer: COMMERCIAL

## 2019-12-30 ENCOUNTER — CLINICAL SUPPORT (OUTPATIENT)
Dept: REHABILITATION | Facility: HOSPITAL | Age: 69
End: 2019-12-30
Attending: ORTHOPAEDIC SURGERY
Payer: COMMERCIAL

## 2019-12-30 DIAGNOSIS — M79.662 PAIN OF LEFT CALF: ICD-10-CM

## 2019-12-30 DIAGNOSIS — G89.29 CHRONIC PAIN OF LEFT KNEE: ICD-10-CM

## 2019-12-30 DIAGNOSIS — M79.662 PAIN OF LEFT CALF: Primary | ICD-10-CM

## 2019-12-30 DIAGNOSIS — M25.562 CHRONIC PAIN OF LEFT KNEE: ICD-10-CM

## 2019-12-30 PROCEDURE — 1159F PR MEDICATION LIST DOCUMENTED IN MEDICAL RECORD: ICD-10-PCS | Mod: S$GLB,,, | Performed by: ORTHOPAEDIC SURGERY

## 2019-12-30 PROCEDURE — 97110 THERAPEUTIC EXERCISES: CPT

## 2019-12-30 PROCEDURE — 1101F PT FALLS ASSESS-DOCD LE1/YR: CPT | Mod: CPTII,S$GLB,, | Performed by: ORTHOPAEDIC SURGERY

## 2019-12-30 PROCEDURE — 93971 EXTREMITY STUDY: CPT | Mod: LT,S$GLB,, | Performed by: INTERNAL MEDICINE

## 2019-12-30 PROCEDURE — 99024 PR POST-OP FOLLOW-UP VISIT: ICD-10-PCS | Mod: S$GLB,,, | Performed by: ORTHOPAEDIC SURGERY

## 2019-12-30 PROCEDURE — 1101F PR PT FALLS ASSESS DOC 0-1 FALLS W/OUT INJ PAST YR: ICD-10-PCS | Mod: CPTII,S$GLB,, | Performed by: ORTHOPAEDIC SURGERY

## 2019-12-30 PROCEDURE — 99024 POSTOP FOLLOW-UP VISIT: CPT | Mod: S$GLB,,, | Performed by: ORTHOPAEDIC SURGERY

## 2019-12-30 PROCEDURE — 97140 MANUAL THERAPY 1/> REGIONS: CPT

## 2019-12-30 PROCEDURE — 93971 CV US DOPPLER VENOUS LEG LEFT (CUPID ONLY): ICD-10-PCS | Mod: LT,S$GLB,, | Performed by: INTERNAL MEDICINE

## 2019-12-30 PROCEDURE — 1159F MED LIST DOCD IN RCRD: CPT | Mod: S$GLB,,, | Performed by: ORTHOPAEDIC SURGERY

## 2019-12-30 NOTE — PROGRESS NOTES
HISTORY OF PRESENT ILLNESS:   Pt is here today for unscheduled 2nd post-operative followup of his knee arthroscopy (with Dr. Salas)  We have reviewed his findings and discussed plan of care and future treatment options.  he is doing well.                                                    Review of Systems   Constitution: Negative. Negative for chills, fever and night sweats.   HENT: Negative for congestion and headaches.    Eyes: Negative for blurred vision, left vision loss and right vision loss.   Cardiovascular: Negative for chest pain and syncope.   Respiratory: Negative for cough and shortness of breath.    Endocrine: Negative for polydipsia, polyphagia and polyuria.   Hematologic/Lymphatic: Negative for bleeding problem. Does not bruise/bleed easily.   Skin: Negative for dry skin, itching and rash.   Musculoskeletal: Negative for falls and muscle weakness.   Gastrointestinal: Negative for abdominal pain and bowel incontinence.   Genitourinary: Negative for bladder incontinence and nocturia.   Neurological: Negative for disturbances in coordination, loss of balance and seizures.   Psychiatric/Behavioral: Negative for depression. The patient does not have insomnia.    Allergic/Immunologic: Negative for hives and persistent infections.     PAST MEDICAL HISTORY:   Past Medical History:   Diagnosis Date    BPH (benign prostatic hyperplasia)     High cholesterol     Hypertension     Kidney stone      PAST SURGICAL HISTORY:   Past Surgical History:   Procedure Laterality Date    ARTHROSCOPIC CHONDROPLASTY OF KNEE JOINT Left 11/22/2019    Procedure: ARTHROSCOPY, KNEE, WITH CHONDROPLASTY;  Surgeon: Bairon Salas MD;  Location: [facility] OR;  Service: Orthopedics;  Laterality: Left;    KNEE ARTHROSCOPY W/ MENISCECTOMY Left 11/22/2019    Procedure: ARTHROSCOPY, KNEE, WITH MENISCECTOMY;  Surgeon: Bairon Salas MD;  Location: [facility] OR;  Service: Orthopedics;  Laterality: Left;  Partial medial and  lateral    TONSILLECTOMY       FAMILY HISTORY:   Family History   Problem Relation Age of Onset    Hypertension Mother     Hypertension Father     Melanoma Neg Hx      SOCIAL HISTORY:   Social History     Socioeconomic History    Marital status:      Spouse name: Not on file    Number of children: Not on file    Years of education: Not on file    Highest education level: Not on file   Occupational History    Occupation: physician   Social Needs    Financial resource strain: Not on file    Food insecurity:     Worry: Not on file     Inability: Not on file    Transportation needs:     Medical: Not on file     Non-medical: Not on file   Tobacco Use    Smoking status: Never Smoker    Smokeless tobacco: Never Used   Substance and Sexual Activity    Alcohol use: Yes    Drug use: No    Sexual activity: Yes     Partners: Female   Lifestyle    Physical activity:     Days per week: Not on file     Minutes per session: Not on file    Stress: Not on file   Relationships    Social connections:     Talks on phone: Not on file     Gets together: Not on file     Attends Temple service: Not on file     Active member of club or organization: Not on file     Attends meetings of clubs or organizations: Not on file     Relationship status: Not on file   Other Topics Concern    Not on file   Social History Narrative    Not on file       MEDICATIONS:   Current Outpatient Medications:     aspirin (ECOTRIN) 325 MG EC tablet, Take 1 tablet (325 mg total) by mouth 2 (two) times daily. for 21 days, Disp: 42 tablet, Rfl: 0    atorvastatin (LIPITOR) 20 MG tablet, TAKE ONE TABLET BY MOUTH EVERY DAY, Disp: 90 tablet, Rfl: 3    famotidine (PEPCID) 20 MG tablet, Take 20 mg by mouth once daily. , Disp: , Rfl:     fish oil-omega-3 fatty acids 300-1,000 mg capsule, Take 2 g by mouth once daily., Disp: , Rfl:     fluticasone (FLONASE) 50 mcg/actuation nasal spray, 1 spray by Each Nare route once daily., Disp: 1  Bottle, Rfl: prn    loratadine 10 mg Cap, Take by mouth., Disp: , Rfl:     metoprolol succinate (TOPROL-XL) 50 MG 24 hr tablet, Take 1 tablet (50 mg total) by mouth once daily., Disp: 90 tablet, Rfl: 3    typhoid (VIVOTIF) DR capsule, Take 1 capsule by mouth every other day., Disp: 4 capsule, Rfl: 0    zolpidem (AMBIEN) 5 MG Tab, Take 1 tablet (5 mg total) by mouth nightly as needed., Disp: 30 tablet, Rfl: 4  ALLERGIES: Review of patient's allergies indicates:  No Known Allergies    VITAL SIGNS: There were no vitals taken for this visit.     1                             PHYSICAL EXAMINATION:     Incision sites healed well  No evidence of any erythema, infection or induration  Full ROM  Minimal effusion  2+ DP pulse  No swelling, no calf tenderness  Minimal quad atrophy  +/- Hohmans sign                                                                               ASSESSMENT:                                                                                                                                               1. Status post above  +/- Hohmans sign                                                                                                                     PLAN:                                                                                                                                                     1. he will f/u with Dr. Salas as needed  2. Negative US today                           3. All questions were answered and he should contact us if he  has any questions or concerns in the interim.

## 2019-12-30 NOTE — PROGRESS NOTES
"  Physical Therapy Daily Treatment Note     Name: Jacques Munoz  Clinic Number: [id number]    Therapy Diagnosis:   Encounter Diagnosis   Name Primary?    Chronic pain of left knee      Physician: Bairon Salas MD    Visit Date: 12/27/2019  Physician Orders: 2 Eval and treat  Medical Diagnosis: Chronic pain left knee   Date of Surgery: 11/22/19  Evaluation Date: 11/25/2019  Authorization Period Expiration: 12/31/19  Plan of Care Certification Period: 2/17/19  Visit # / Visits authorized: 9/ 12    Time In: 1100  Time Out: 1200   Total Billable Time: 30 minutes    Precautions: Standard    Subjective     Pt reports feeling well w/ no c/o pn in L knee.    He was compliant with home exercise program.  Response to previous treatment: no adverse effects  Functional change: no change     Pain: 0/10  Location: left knee      Objective       Dustin received therapeutic exercises to develop strength, endurance, ROM and flexibility for 50 minutes ( 20 min 1 on 1 ) including:    Bike 5 min to increase blood flow   Child's pose stretch 10x10"  Standing TKE yellow theraband 30x5"  Stool scoots 3x50'   b HR 30 x 3 sec hold   Step up 12" box 3x10 each  SL blue box squat 3x10 with UE support  3-way slider squat 3x10 each    Not performed today:   Quad set heel raise 30x5"  Neutral talar positioning with BW squat x20 green theraband  Lateral step down 6" x20 with nuetral talar position and orange theraband- NP  DL red box squat 3x10 15#  Standing hamstring curl 3x10 each 3#  Lateral band walking green theraband around ankles 3x50'- NP  Neutral talar positioning green theraband around arches 20x5"- NP      Dustin received the following manual therapy techniques: Joint mobilizations were applied to the: L knee for 10 minutes, including:  Patellar mobilizations in all directions, grade III/IV  Posterior femoral mobs, grade III/IV to improve extension       Dustin participated in gait training to improve functional mobility and safety for " "8  minutes, including:- NP  Gait train to focus on heel strike and knee extenion, also performed gait over osbaldo to promote good pattern.     Not performed:   SL calf raise to fatigue x1 set each  Blue box squats 3x10  Shuttle squat 75# 3x10  SL shuttle squat 32.5# 3x10  Incline calf stretch 3x30 sec- NP   SLS on ground 2x30 sec- NP  Tandem stance on ground 2x30 sec  Seated calf stretch 3x30 sec- NP  Self knee extension mob with strap 10x5"- NP  SAQ 5# 3x10 -NP  Seated SLR 3x10 3#- NP     Home Exercises Provided and Patient Education Provided     Education provided:   - pt edu on proper exercise technique.      Written Home Exercises Provided: Patient instructed to cont prior HEP.  Exercises were reviewed and Dustin was able to demonstrate them prior to the end of the session.  Dustin demonstrated good  understanding of the education provided.     See EMR under Patient Instructions for exercises provided prior visit.    Assessment      Pt cont to have qud weakness.  Pt frank tx well w/ no c/o pn.  Pt displayed improved quad strength and balance during therex.    Dustin is progressing well towards his goals.   Pt prognosis is Excellent.     Pt will continue to benefit from skilled outpatient physical therapy to address the deficits listed in the problem list box on initial evaluation, provide pt/family education and to maximize pt's level of independence in the home and community environment.     Pt's spiritual, cultural and educational needs considered and pt agreeable to plan of care and goals.    Anticipated barriers to physical therapy: none    Goals:   Short Term GOALS:  In 4-8 weeks, pt. will:  1. Pt will increase ROM to the left knee  to 0-0-120 in order to perform ADLs and IADLs more effectively (progressing, not met)  2. Decrease overall pain to left knee to 1-3/10 grossly throughout,  on average with daily activities (progressing, not met)  3. Increase strength to the left knee to 4+/5,  in order to perform ADLs and " IADLs more effectively (progressing, not met)  4. Improve FOTO intake score to 70 to demonstrate improvement with functional mobility and use (progressing, not met)  5. Independent with HEP (progressing, not met)  Long Term GOALS:  In 8-12 weeks, pt. will:  1. Pt will increase ROM to the left knee to WNL, in order to perform ADLs and IADLs more effectively (progressing, not met)  2. Decrease overall pain to 0-1/10,  on average with daily activities (progressing, not met)  3. Increase strength to the left knee to 5/5 grossly throughout,  in order to perform ADLs and IADLs more effectively (progressing, not met)  4. Improve FOTO intake score to 80 to demonstrate improvement with functional mobility and use (progressing, not met)  5. Independent with HEP (progressing, not met)  6. Return to full recreational exercise unrestricted (progressing, not met)    Plan     Cont to progress towards goals set by PT.  Cont to improve ROM and quad strength next visit.      Denis Quan, PTA

## 2020-01-03 ENCOUNTER — CLINICAL SUPPORT (OUTPATIENT)
Dept: REHABILITATION | Facility: HOSPITAL | Age: 70
End: 2020-01-03
Attending: ORTHOPAEDIC SURGERY
Payer: COMMERCIAL

## 2020-01-03 DIAGNOSIS — G89.29 CHRONIC PAIN OF LEFT KNEE: ICD-10-CM

## 2020-01-03 DIAGNOSIS — M25.562 CHRONIC PAIN OF LEFT KNEE: ICD-10-CM

## 2020-01-03 PROCEDURE — 97110 THERAPEUTIC EXERCISES: CPT

## 2020-01-03 PROCEDURE — 97140 MANUAL THERAPY 1/> REGIONS: CPT

## 2020-01-03 NOTE — PROGRESS NOTES
"  Physical Therapy Daily Treatment Note     Name: Jacques Munoz  Clinic Number: [id number]    Therapy Diagnosis:   Encounter Diagnosis   Name Primary?    Chronic pain of left knee      Physician: Bairon Salas MD    Visit Date: 1/3/2020  Physician Orders: 2 Eval and treat  Medical Diagnosis: Chronic pain left knee   Date of Surgery: 11/22/19  Evaluation Date: 11/25/2019  Authorization Period Expiration: 12/31/19  Plan of Care Certification Period: 2/17/19  Visit # / Visits authorized: 9/ 12    Time In: 0855  Time Out: 0950   Total Billable Time: 55 minutes    Precautions: Standard    Subjective     He fell on New Year's jennifer, and has a little increased swelling from that, but mostly soreness on his right side. He had a negative ultrasound for DVT.   He was compliant with home exercise program.  Response to previous treatment: no adverse effects  Functional change: no change     Pain: 0/10  Location: left knee      Objective   0/0/125    Dustin received therapeutic exercises to develop strength, endurance, ROM and flexibility for 45 minutes including:    Seated calf stretch 3x30 sec  Seated soleus stretch 3x30 sec  Supine hamstring stretch 3x30 sec  90/90 hamstring stretch 20x5"  SL eccentric calf raise 2x10  Step up 12" box 3x10 each  Split squat 2x10 each  Lateral step down 6" x20 with nuetral talar position and orange theraband  SL bridge 2x10 each  SL blue box squat 3x10 with UE support    Dustin received the following manual therapy techniques: Joint mobilizations were applied to the: L knee for 10 minutes, including:  Patellar mobilizations in all directions, grade III/IV  Posterior femoral mobs, grade III/IV to improve extension   IASTM to L calf to decrease trigger points -NP    Dustin participated in gait training to improve functional mobility and safety for 8  minutes, including:- NP  Gait train to focus on heel strike and knee extenion, also performed gait over osbaldo to promote good pattern.      Home " Exercises Provided and Patient Education Provided     Education provided:   - pt edu on proper exercise technique.      Written Home Exercises Provided: Patient instructed to cont prior HEP.  Exercises were reviewed and Dustin was able to demonstrate them prior to the end of the session.  Dustin demonstrated good  understanding of the education provided.     See EMR under Patient Instructions for exercises provided prior visit.    Assessment      Reported reproduction of location of symptoms with soleus stretch. Increased focus on posterior chain stretching and eccentric calf strengthening to decrease symptoms. Difficulty performing eccentric knee flexion with split squats. Will continue to progress pt towards return to gym with .   Dustin is progressing well towards his goals.   Pt prognosis is Excellent.     Pt will continue to benefit from skilled outpatient physical therapy to address the deficits listed in the problem list box on initial evaluation, provide pt/family education and to maximize pt's level of independence in the home and community environment.     Pt's spiritual, cultural and educational needs considered and pt agreeable to plan of care and goals.    Anticipated barriers to physical therapy: none    Goals:   Short Term GOALS:  In 4-8 weeks, pt. will:  1. Pt will increase ROM to the left knee  to 0-0-120 in order to perform ADLs and IADLs more effectively (progressing, not met)  2. Decrease overall pain to left knee to 1-3/10 grossly throughout,  on average with daily activities (progressing, not met)  3. Increase strength to the left knee to 4+/5,  in order to perform ADLs and IADLs more effectively (progressing, not met)  4. Improve FOTO intake score to 70 to demonstrate improvement with functional mobility and use (progressing, not met)  5. Independent with HEP (progressing, not met)  Long Term GOALS:  In 8-12 weeks, pt. will:  1. Pt will increase ROM to the left knee to WNL, in order to  perform ADLs and IADLs more effectively (progressing, not met)  2. Decrease overall pain to 0-1/10,  on average with daily activities (progressing, not met)  3. Increase strength to the left knee to 5/5 grossly throughout,  in order to perform ADLs and IADLs more effectively (progressing, not met)  4. Improve FOTO intake score to 80 to demonstrate improvement with functional mobility and use (progressing, not met)  5. Independent with HEP (progressing, not met)  6. Return to full recreational exercise unrestricted (progressing, not met)    Plan     Cont to progress towards goals.  Cont to improve ROM and quad strength next visit.      Lorie Schaeffer, PT

## 2020-01-06 ENCOUNTER — CLINICAL SUPPORT (OUTPATIENT)
Dept: REHABILITATION | Facility: HOSPITAL | Age: 70
End: 2020-01-06
Attending: ORTHOPAEDIC SURGERY
Payer: COMMERCIAL

## 2020-01-06 DIAGNOSIS — M25.562 CHRONIC PAIN OF LEFT KNEE: ICD-10-CM

## 2020-01-06 DIAGNOSIS — G89.29 CHRONIC PAIN OF LEFT KNEE: ICD-10-CM

## 2020-01-06 PROCEDURE — 97116 GAIT TRAINING THERAPY: CPT

## 2020-01-06 PROCEDURE — 97140 MANUAL THERAPY 1/> REGIONS: CPT

## 2020-01-06 PROCEDURE — 97110 THERAPEUTIC EXERCISES: CPT

## 2020-01-06 NOTE — PROGRESS NOTES
"  Physical Therapy Daily Treatment Note     Name: Jacques Munoz  Clinic Number: [id number]    Therapy Diagnosis:   Encounter Diagnosis   Name Primary?    Chronic pain of left knee      Physician: Bairon Salas MD    Visit Date: 1/6/2020  Physician Orders: 2 Eval and treat  Medical Diagnosis: Chronic pain left knee   Date of Surgery: 11/22/19  Evaluation Date: 11/25/2019  Authorization Period Expiration: 12/31/19  Plan of Care Certification Period: 2/17/19  Visit # / Visits authorized: 9/ 12    Time In: 1100  Time Out: 1155   Total Billable Time: 55 minutes    Precautions: Standard    Subjective     His calf pain feels 75% better. He sees Dr. Salas tomorrow for a follow-up.   He was compliant with home exercise program.  Response to previous treatment: no adverse effects  Functional change: no change     Pain: 0/10  Location: left knee      Objective   0/0/125    Dustin received therapeutic exercises to develop strength, endurance, ROM and flexibility for 37 minutes including:    Seated calf stretch 3x30 sec  Seated soleus stretch 3x30 sec  Supine hamstring stretch 3x30 sec  90/90 hamstring stretch 20x5"  SL eccentric calf raise 2x10 off step  SL eccentric soleus calf raise 2x10 off step  Step up 12" box 3x10 each  Split squat 2x10 each  DL squat 3x10 10#  Shuttle squat 87.5# 3x10    Dustin received the following manual therapy techniques: Joint mobilizations were applied to the: L knee for 10 minutes, including:  Patellar mobilizations in all directions, grade III/IV  Posterior femoral mobs, grade III/IV to improve extension   IASTM to L calf to decrease trigger points -NP    Dustin participated in gait training to improve functional mobility and safety for 8  minutes, including:  Gait train to focus on heel strike and knee extenion, also performed gait over osbaldo to promote good pattern.      Home Exercises Provided and Patient Education Provided     Education provided:   - pt edu on proper exercise " technique.      Written Home Exercises Provided: Patient instructed to cont prior HEP.  Exercises were reviewed and Dustin was able to demonstrate them prior to the end of the session.  Dustin demonstrated good  understanding of the education provided.     See EMR under Patient Instructions for exercises provided prior visit.    Assessment      Decreased antalgic gait after manual therapy, stretching, and gait training over hurdles. Increased focus on gastroc/soleus strength and mobility, as well as progressing LE strengthening for return to gym activities. Tried squats without the box today, with tendency for anterior weight shift. Educated pt on performing hip hinge first, then knee flexion.   Dustin is progressing well towards his goals.   Pt prognosis is Excellent.     Pt will continue to benefit from skilled outpatient physical therapy to address the deficits listed in the problem list box on initial evaluation, provide pt/family education and to maximize pt's level of independence in the home and community environment.     Pt's spiritual, cultural and educational needs considered and pt agreeable to plan of care and goals.    Anticipated barriers to physical therapy: none    Goals:   Short Term GOALS:  In 4-8 weeks, pt. will:  1. Pt will increase ROM to the left knee  to 0-0-120 in order to perform ADLs and IADLs more effectively (progressing, not met)  2. Decrease overall pain to left knee to 1-3/10 grossly throughout,  on average with daily activities (progressing, not met)  3. Increase strength to the left knee to 4+/5,  in order to perform ADLs and IADLs more effectively (progressing, not met)  4. Improve FOTO intake score to 70 to demonstrate improvement with functional mobility and use (progressing, not met)  5. Independent with HEP (progressing, not met)  Long Term GOALS:  In 8-12 weeks, pt. will:  1. Pt will increase ROM to the left knee to WNL, in order to perform ADLs and IADLs more effectively (progressing,  not met)  2. Decrease overall pain to 0-1/10,  on average with daily activities (progressing, not met)  3. Increase strength to the left knee to 5/5 grossly throughout,  in order to perform ADLs and IADLs more effectively (progressing, not met)  4. Improve FOTO intake score to 80 to demonstrate improvement with functional mobility and use (progressing, not met)  5. Independent with HEP (progressing, not met)  6. Return to full recreational exercise unrestricted (progressing, not met)    Plan     Cont to progress towards goals.  Cont to improve ROM and quad strength next visit.      Lorie Schaeffer, PT

## 2020-01-07 ENCOUNTER — OFFICE VISIT (OUTPATIENT)
Dept: SPORTS MEDICINE | Facility: CLINIC | Age: 70
End: 2020-01-07
Payer: COMMERCIAL

## 2020-01-07 VITALS
HEART RATE: 61 BPM | HEIGHT: 73 IN | WEIGHT: 190 LBS | SYSTOLIC BLOOD PRESSURE: 149 MMHG | DIASTOLIC BLOOD PRESSURE: 95 MMHG | BODY MASS INDEX: 25.18 KG/M2

## 2020-01-07 DIAGNOSIS — Z98.890 S/P MEDIAL MENISCECTOMY OF LEFT KNEE: ICD-10-CM

## 2020-01-07 DIAGNOSIS — Z98.890 S/P LATERAL MENISCECTOMY OF LEFT KNEE: ICD-10-CM

## 2020-01-07 DIAGNOSIS — Z98.890 POST-OPERATIVE STATE: Primary | ICD-10-CM

## 2020-01-07 PROCEDURE — 99024 PR POST-OP FOLLOW-UP VISIT: ICD-10-PCS | Mod: S$GLB,,, | Performed by: ORTHOPAEDIC SURGERY

## 2020-01-07 PROCEDURE — 99999 PR PBB SHADOW E&M-EST. PATIENT-LVL III: ICD-10-PCS | Mod: PBBFAC,,, | Performed by: ORTHOPAEDIC SURGERY

## 2020-01-07 PROCEDURE — 20610 DRAIN/INJ JOINT/BURSA W/O US: CPT | Mod: 58,LT,S$GLB, | Performed by: ORTHOPAEDIC SURGERY

## 2020-01-07 PROCEDURE — 99024 POSTOP FOLLOW-UP VISIT: CPT | Mod: S$GLB,,, | Performed by: ORTHOPAEDIC SURGERY

## 2020-01-07 PROCEDURE — 20610 LARGE JOINT ASPIRATION/INJECTION: L KNEE: ICD-10-PCS | Mod: 58,LT,S$GLB, | Performed by: ORTHOPAEDIC SURGERY

## 2020-01-07 PROCEDURE — 99999 PR PBB SHADOW E&M-EST. PATIENT-LVL III: CPT | Mod: PBBFAC,,, | Performed by: ORTHOPAEDIC SURGERY

## 2020-01-07 RX ORDER — TRIAMCINOLONE ACETONIDE 40 MG/ML
40 INJECTION, SUSPENSION INTRA-ARTICULAR; INTRAMUSCULAR
Status: DISCONTINUED | OUTPATIENT
Start: 2020-01-07 | End: 2020-01-07 | Stop reason: HOSPADM

## 2020-01-07 RX ADMIN — TRIAMCINOLONE ACETONIDE 40 MG: 40 INJECTION, SUSPENSION INTRA-ARTICULAR; INTRAMUSCULAR at 02:01

## 2020-01-07 NOTE — PROGRESS NOTES
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 Since last visit he saw Dr. Mae with concern for lower extremity swelling, US was negative for DVT.     DATE OF PROCEDURE:  11/22/2019      PREOPERATIVE DIAGNOSES:   1. Left knee chondromalacia  2. Left knee medial meniscus tear.   3. Left knee lateral meniscus tear     POSTOPERATIVE DIAGNOSES:   1. Left knee chondromalacia  2. Left knee medial meniscus tear.   3. Left knee lateral meniscus tear     PROCEDURES:   1. Left knee arthroscopic chondroplasty  2. Left knee arthroscopic partial medial and lateral meniscectomies     SURGEON: Bairon Salas M.D.     PE:    BP (!) 149/95   Pulse 61   Ht 6' 1" (1.854 m)   Wt 86.2 kg (190 lb)   BMI 25.07 kg/m²      Left knee:    Incision clean/dry/intact  No sign of infection  Mild swelling  Compartments soft  Neurovascular status intact in extremity    FROM  Good quad strength  No effusion  No tenderness over medial or lateral joint lines    Assessment:  7 weeks s/p left knee scope    Plan:  1. Joint aspiration today - 20cc  2. Cortisone injection today  3. JENNIFER hose provided to patient  4. Follow up as needed        "

## 2020-01-07 NOTE — PROCEDURES
Large Joint Aspiration/Injection: L knee  Date/Time: 1/7/2020 2:30 PM  Performed by: Bairon Salas MD  Authorized by: Bairon Salas MD     Consent Done?:  Yes (Verbal)  Indications:  Pain and joint swelling  Procedure site marked: Yes    Timeout: Prior to procedure the correct patient, procedure, and site was verified      Location:  Knee  Site:  L knee  Prep: Patient was prepped and draped in usual sterile fashion    Needle size:  22 G  Ultrasonic Guidance for needle placement: No  Approach:  Superior  Medications:  40 mg triamcinolone acetonide 40 mg/mL  Aspirate amount (ml):  20  Aspirate:  Serous  Patient tolerance:  Patient tolerated the procedure well with no immediate complications

## 2020-01-10 ENCOUNTER — CLINICAL SUPPORT (OUTPATIENT)
Dept: REHABILITATION | Facility: HOSPITAL | Age: 70
End: 2020-01-10
Attending: ORTHOPAEDIC SURGERY
Payer: COMMERCIAL

## 2020-01-10 DIAGNOSIS — G89.29 CHRONIC PAIN OF LEFT KNEE: ICD-10-CM

## 2020-01-10 DIAGNOSIS — M25.562 CHRONIC PAIN OF LEFT KNEE: ICD-10-CM

## 2020-01-10 PROCEDURE — 97530 THERAPEUTIC ACTIVITIES: CPT

## 2020-01-10 NOTE — PROGRESS NOTES
"  Physical Therapy Daily Treatment Note and Discharge     Name: Jacques Munoz  Clinic Number: [id number]    Therapy Diagnosis:   Encounter Diagnosis   Name Primary?    Chronic pain of left knee      Physician: Bairon Salas MD    Visit Date: 1/10/2020  Physician Orders: 2 Eval and treat  Medical Diagnosis: Chronic pain left knee   Date of Surgery: 11/22/19  Evaluation Date: 11/25/2019  Authorization Period Expiration: 12/31/19  Plan of Care Certification Period: 2/17/19  Visit # / Visits authorized: 10/ 12    Time In: 1000  Time Out: 1100   Total Billable Time: 20 minutes    Precautions: Standard    Subjective     He had a follow-up with Dr. Salas, and he drained 20 cc and gave him a steroid injection. He feels much better now, with no problems. He wants to return to the gym with his .   He was compliant with home exercise program.  Response to previous treatment: no adverse effects  Functional change: no change     Pain: 0/10  Location: left knee      Objective     AROM Right Left Comment   Knee Extension: 5 degrees 5 degrees    Knee Flexion: 135 degrees 125 degrees          PROM Right Left    Knee Extension: 5 degrees 5 degrees    Knee Flexion: 138 degrees 135 degrees    *pain     Right Left Comment   Hip Flexion: 5/5 5/5    Hip ABD: 5/5 5/5    Hip ADD: 5/5 5/5    Hip Extension: 5/5 5/5    Knee Ext: 5/5 5/5    Knee Flex: 5/5 5/5        Dustin received the following manual therapy techniques: Joint mobilizations were applied to the: L knee for 10 minutes, including:  Patellar mobilizations in all directions, grade III/IV  Posterior femoral mobs, grade III/IV to improve extension     Dustin received 50 min therapeutic activities, including:  Step up 12" 3x10  3-way slider squat x10 each  Hip hinge with dowel x20  DL squat 3x10 20#     Home Exercises Provided and Patient Education Provided     Education provided:   - pt edu on proper exercise technique.      Written Home Exercises Provided: Patient " instructed to cont prior HEP.  Exercises were reviewed and Dustin was able to demonstrate them prior to the end of the session.  Dustin demonstrated good  understanding of the education provided.     See EMR under Patient Instructions for exercises provided prior visit.    Assessment      No gait abnormalities noted today. Improved squat form after education on hip hinge. Pt is ready for discharge with return to  activities at the gym. PT will contact the  for any questions she may have about his care.   Dustin is progressing well towards his goals.   Pt prognosis is Excellent.     Pt will continue to benefit from skilled outpatient physical therapy to address the deficits listed in the problem list box on initial evaluation, provide pt/family education and to maximize pt's level of independence in the home and community environment.     Pt's spiritual, cultural and educational needs considered and pt agreeable to plan of care and goals.    Anticipated barriers to physical therapy: none    Goals:   Short Term GOALS:  In 4-8 weeks, pt. will:  1. Pt will increase ROM to the left knee  to 0-0-120 in order to perform ADLs and IADLs more effectively (progressing, not met)  2. Decrease overall pain to left knee to 1-3/10 grossly throughout,  on average with daily activities (progressing, not met)  3. Increase strength to the left knee to 4+/5,  in order to perform ADLs and IADLs more effectively (progressing, not met)  4. Improve FOTO intake score to 70 to demonstrate improvement with functional mobility and use (progressing, not met)  5. Independent with HEP (progressing, not met)  Long Term GOALS:  In 8-12 weeks, pt. will:  1. Pt will increase ROM to the left knee to WNL, in order to perform ADLs and IADLs more effectively (progressing, not met)  2. Decrease overall pain to 0-1/10,  on average with daily activities (progressing, not met)  3. Increase strength to the left knee to 5/5 grossly throughout,  in order to  perform ADLs and IADLs more effectively (progressing, not met)  4. Improve FOTO intake score to 80 to demonstrate improvement with functional mobility and use (progressing, not met)  5. Independent with HEP (progressing, not met)  6. Return to full recreational exercise unrestricted (progressing, not met)    Plan     Cont to progress towards goals.  Cont to improve ROM and quad strength next visit.      Lorie Schaeffer, PT

## 2020-01-20 ENCOUNTER — CLINICAL SUPPORT (OUTPATIENT)
Dept: AUDIOLOGY | Facility: CLINIC | Age: 70
End: 2020-01-20
Payer: COMMERCIAL

## 2020-01-20 DIAGNOSIS — H90.3 SENSORINEURAL HEARING LOSS, BILATERAL: Primary | ICD-10-CM

## 2020-01-20 PROCEDURE — 99999 PR PBB SHADOW E&M-EST. PATIENT-LVL I: CPT | Mod: PBBFAC,,, | Performed by: PHYSICIAN ASSISTANT

## 2020-01-20 PROCEDURE — 92567 PR TYMPA2METRY: ICD-10-PCS | Mod: S$GLB,,, | Performed by: PHYSICIAN ASSISTANT

## 2020-01-20 PROCEDURE — 92567 TYMPANOMETRY: CPT | Mod: S$GLB,,, | Performed by: PHYSICIAN ASSISTANT

## 2020-01-20 PROCEDURE — 92557 PR COMPREHENSIVE HEARING TEST: ICD-10-PCS | Mod: S$GLB,,, | Performed by: PHYSICIAN ASSISTANT

## 2020-01-20 PROCEDURE — 92557 COMPREHENSIVE HEARING TEST: CPT | Mod: S$GLB,,, | Performed by: PHYSICIAN ASSISTANT

## 2020-01-20 PROCEDURE — 99999 PR PBB SHADOW E&M-EST. PATIENT-LVL I: ICD-10-PCS | Mod: PBBFAC,,, | Performed by: PHYSICIAN ASSISTANT

## 2020-01-20 NOTE — PROGRESS NOTES
Jacques Munoz was seen today in the clinic for an audiologic evaluation.  His main complaints were hearing loss, tinnitus and difficulty with understanding speech in noisy environments.       Tympanometry revealed Type A in the right ear and Type A in the left ear.  Audiogram results revealed a mild, mixed hearing loss in the right ear and a mild/moderate primarily sensorineural hearing loss in the left ear.  Speech reception thresholds were noted at 15dB in the right ear and 20dB in the left ear.  Speech discrimination scores were 96% bilaterally.    Recommendations:  1. Otologic evaluation if tinnitus persists or worsens  2. Annual audiogram  3. Noise protection when in noise  4. Hearing aid consultation

## 2020-01-30 DIAGNOSIS — E78.00 PURE HYPERCHOLESTEROLEMIA: ICD-10-CM

## 2020-01-30 DIAGNOSIS — I10 ESSENTIAL HYPERTENSION: ICD-10-CM

## 2020-01-30 RX ORDER — METOPROLOL SUCCINATE 50 MG/1
50 TABLET, EXTENDED RELEASE ORAL DAILY
Qty: 90 TABLET | Refills: 3 | Status: SHIPPED | OUTPATIENT
Start: 2020-01-30 | End: 2021-01-26 | Stop reason: SDUPTHER

## 2020-01-30 RX ORDER — ATORVASTATIN CALCIUM 20 MG/1
TABLET, FILM COATED ORAL
Qty: 90 TABLET | Refills: 3 | Status: SHIPPED | OUTPATIENT
Start: 2020-01-30 | End: 2021-01-26 | Stop reason: SDUPTHER

## 2020-02-18 ENCOUNTER — CLINICAL SUPPORT (OUTPATIENT)
Dept: AUDIOLOGY | Facility: CLINIC | Age: 70
End: 2020-02-18
Payer: COMMERCIAL

## 2020-02-18 DIAGNOSIS — H90.3 SENSORINEURAL HEARING LOSS, BILATERAL: Primary | ICD-10-CM

## 2020-02-18 PROCEDURE — 99499 UNLISTED E&M SERVICE: CPT | Mod: S$GLB,,, | Performed by: AUDIOLOGIST

## 2020-02-18 PROCEDURE — 99499 NO LOS: ICD-10-PCS | Mod: S$GLB,,, | Performed by: AUDIOLOGIST

## 2020-02-18 NOTE — PROGRESS NOTES
Jacques Munoz was seen today for a hearing aid consult. 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 If a sooner time becomes available we will notify Dr. Munoz.

## 2020-03-09 ENCOUNTER — CLINICAL SUPPORT (OUTPATIENT)
Dept: AUDIOLOGY | Facility: CLINIC | Age: 70
End: 2020-03-09
Payer: COMMERCIAL

## 2020-03-09 DIAGNOSIS — H90.3 SENSORINEURAL HEARING LOSS, BILATERAL: Primary | ICD-10-CM

## 2020-03-09 PROCEDURE — 99499 UNLISTED E&M SERVICE: CPT | Mod: S$GLB,,, | Performed by: AUDIOLOGIST

## 2020-03-09 PROCEDURE — 99499 NO LOS: ICD-10-PCS | Mod: S$GLB,,, | Performed by: AUDIOLOGIST

## 2020-03-09 NOTE — PROGRESS NOTES
Jacques Munoz was seen today for a hearing aid fitting. Jacques Munoz was fit with the following:     ReSound Quattro 9-R   Medium Blonde   Rt SN [id number]   Lt SN [id number]   : 3LP   Warranty Exp 3/19/23      SN [id number]       Jacques Munoz was counseled on care, use and maintenance of the hearing aids. The hearing aids were also paired to the iPhone and Jacques Munoz was counseled on the Resound Smart 3D blas. Jacques Munoz  will return in 3 weeks for a follow-up

## 2020-03-12 NOTE — PROGRESS NOTES
"  Physical Therapy Daily Treatment Note     Name: Jacques Munoz  Clinic Number: [id number]    Therapy Diagnosis:   Encounter Diagnosis   Name Primary?    Chronic pain of left knee      Physician: Bairon Salas MD    Visit Date: 12/30/2019  Physician Orders: 2 Eval and treat  Medical Diagnosis: Chronic pain left knee   Date of Surgery: 11/22/19  Evaluation Date: 11/25/2019  Authorization Period Expiration: 12/31/19  Plan of Care Certification Period: 2/17/19  Visit # / Visits authorized: 9/ 12    Time In: 0855  Time Out: 0950   Total Billable Time: 55 minutes    Precautions: Standard    Subjective     He has been having lateral calf cramping since last session, but his knee is feeling great.   He was compliant with home exercise program.  Response to previous treatment: no adverse effects  Functional change: no change     Pain: 0/10  Location: left knee      Objective       Dustin received therapeutic exercises to develop strength, endurance, ROM and flexibility for 40 minutes including:    Bike 8 min to increase blood flow   Step up 12" box 3x10 each  Lateral step down 6" x20 with nuetral talar position and orange theraband- NP  DL red box squat 3x10 15#  SL blue box squat 3x10 with UE support  3-way slider squat 3x10 each  Calf stretch off step 3x30 sec each  DL shuttle squat 75# 3x10  SL shuttle squat 37.5# 3x10  Tandem stance balance with red med ball throws x30 each    Dustin received the following manual therapy techniques: Joint mobilizations were applied to the: L knee for 15 minutes, including:  Patellar mobilizations in all directions, grade III/IV  Posterior femoral mobs, grade III/IV to improve extension   IASTM to L calf to decrease trigger points     Dustin participated in gait training to improve functional mobility and safety for 8  minutes, including:- NP  Gait train to focus on heel strike and knee extenion, also performed gait over osbaldo to promote good pattern.     Not performed:    Child's pose " "stretch 10x10"  Standing TKE yellow theraband 30x5"  Stool scoots 3x50'   b HR 30 x 3 sec hold   Quad set heel raise 30x5"  Neutral talar positioning with BW squat x20 green theraband  Standing hamstring curl 3x10 each 3#  Lateral band walking green theraband around ankles 3x50'- NP  Neutral talar positioning green theraband around arches 20x5"- NP  SL calf raise to fatigue x1 set each  Blue box squats 3x10  Shuttle squat 75# 3x10  SL shuttle squat 32.5# 3x10  Incline calf stretch 3x30 sec- NP   SLS on ground 2x30 sec- NP  Tandem stance on ground 2x30 sec  Seated calf stretch 3x30 sec- NP  Self knee extension mob with strap 10x5"- NP  SAQ 5# 3x10 -NP  Seated SLR 3x10 3#- NP     Home Exercises Provided and Patient Education Provided     Education provided:   - pt edu on proper exercise technique.      Written Home Exercises Provided: Patient instructed to cont prior HEP.  Exercises were reviewed and Dustin was able to demonstrate them prior to the end of the session.  Dustin demonstrated good  understanding of the education provided.     See EMR under Patient Instructions for exercises provided prior visit.    Assessment      Limited by calf pain with single leg squatting activities. Improved overall swelling and joint mobility. Tenderness to calf but no redness, heat, or increased swelling noted. Educated patient he can still talk to physician for peace of mind due to DVT risk after surgery.   Dustin is progressing well towards his goals.   Pt prognosis is Excellent.     Pt will continue to benefit from skilled outpatient physical therapy to address the deficits listed in the problem list box on initial evaluation, provide pt/family education and to maximize pt's level of independence in the home and community environment.     Pt's spiritual, cultural and educational needs considered and pt agreeable to plan of care and goals.    Anticipated barriers to physical therapy: none    Goals:   Short Term GOALS:  In 4-8 weeks, pt. " will:  1. Pt will increase ROM to the left knee  to 0-0-120 in order to perform ADLs and IADLs more effectively (progressing, not met)  2. Decrease overall pain to left knee to 1-3/10 grossly throughout,  on average with daily activities (progressing, not met)  3. Increase strength to the left knee to 4+/5,  in order to perform ADLs and IADLs more effectively (progressing, not met)  4. Improve FOTO intake score to 70 to demonstrate improvement with functional mobility and use (progressing, not met)  5. Independent with HEP (progressing, not met)  Long Term GOALS:  In 8-12 weeks, pt. will:  1. Pt will increase ROM to the left knee to WNL, in order to perform ADLs and IADLs more effectively (progressing, not met)  2. Decrease overall pain to 0-1/10,  on average with daily activities (progressing, not met)  3. Increase strength to the left knee to 5/5 grossly throughout,  in order to perform ADLs and IADLs more effectively (progressing, not met)  4. Improve FOTO intake score to 80 to demonstrate improvement with functional mobility and use (progressing, not met)  5. Independent with HEP (progressing, not met)  6. Return to full recreational exercise unrestricted (progressing, not met)    Plan     Cont to progress towards goals set by PT.  Cont to improve ROM and quad strength next visit.      Lorie Schaeffer, PT   Benzoyl Peroxide Counseling: Patient counseled that medicine may cause skin irritation and bleach clothing.  In the event of skin irritation, the patient was advised to reduce the amount of the drug applied or use it less frequently.   The patient verbalized understanding of the proper use and possible adverse effects of benzoyl peroxide.  All of the patient's questions and concerns were addressed.

## 2020-03-18 ENCOUNTER — PATIENT MESSAGE (OUTPATIENT)
Dept: AUDIOLOGY | Facility: CLINIC | Age: 70
End: 2020-03-18

## 2020-04-21 DIAGNOSIS — Z01.84 ANTIBODY RESPONSE EXAMINATION: ICD-10-CM

## 2020-04-23 ENCOUNTER — LAB VISIT (OUTPATIENT)
Dept: LAB | Facility: HOSPITAL | Age: 70
End: 2020-04-23
Attending: INTERNAL MEDICINE
Payer: COMMERCIAL

## 2020-04-23 DIAGNOSIS — Z01.84 ANTIBODY RESPONSE EXAMINATION: ICD-10-CM

## 2020-04-23 LAB — SARS-COV-2 IGG SERPL QL IA: NEGATIVE

## 2020-04-23 PROCEDURE — 36415 COLL VENOUS BLD VENIPUNCTURE: CPT

## 2020-04-23 PROCEDURE — 86769 SARS-COV-2 COVID-19 ANTIBODY: CPT

## 2020-07-17 NOTE — PROGRESS NOTES
Subjective:       Patient ID: Jacques Munoz is a 70 y.o. male.    Chief Complaint: No chief complaint on file.    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    Overall, he continues to do well.  He has gained a few pounds over the last 6 months .  He is due for follow-up in Cardiology.  Last colonoscopy was 2011.    Review of Systems   Constitutional: Negative for appetite change and unexpected weight change.   Eyes: Negative for visual disturbance.   Respiratory: Negative for cough and shortness of breath.    Cardiovascular: Negative for chest pain.   Gastrointestinal: Negative for abdominal pain and diarrhea.   Genitourinary: Negative for frequency.   Musculoskeletal: Negative for back pain.   Skin: Negative for rash.   Neurological: Negative for headaches.   Hematological: Negative for adenopathy.   Psychiatric/Behavioral: The patient is not nervous/anxious.        Objective:      Physical Exam  Vitals signs reviewed.   Constitutional:       Appearance: He is well-developed.   HENT:      Head: Normocephalic.      Mouth/Throat:      Pharynx: No oropharyngeal exudate.   Eyes:      General: No scleral icterus.  Neck:      Vascular: No JVD.   Cardiovascular:      Rate and Rhythm: Normal rate and regular rhythm.   Pulmonary:      Effort: Pulmonary effort is normal.      Breath sounds: Normal breath sounds.   Abdominal:      General: Bowel sounds are normal.      Palpations: Abdomen is soft.      Hernia: There is no hernia in the left inguinal area.   Genitourinary:     Scrotum/Testes:         Right: Mass not present.         Left: Mass not present.      Prostate: Normal. Not enlarged and not tender.      Rectum: Guaiac result negative.   Lymphadenopathy:      Cervical: No cervical adenopathy.   Neurological:      Mental Status: He is alert and oriented to person,  place, and time.   Psychiatric:         Behavior: Behavior normal.         Thought Content: Thought content normal.         Assessment:     CBC is normal, metabolic profile is normal other than bilirubin 1.8, cholesterol 188 with an HDL of 76 and LDL of 99, TSH is normal  1. Essential hypertension    2. Pure hypercholesterolemia        Plan:       Return to clinic in 1 year with labs.    C-scope in 2011.  TDAP

## 2020-07-20 ENCOUNTER — LAB VISIT (OUTPATIENT)
Dept: LAB | Facility: HOSPITAL | Age: 70
End: 2020-07-20
Attending: INTERNAL MEDICINE
Payer: COMMERCIAL

## 2020-07-20 ENCOUNTER — OFFICE VISIT (OUTPATIENT)
Dept: HEMATOLOGY/ONCOLOGY | Facility: CLINIC | Age: 70
End: 2020-07-20
Payer: COMMERCIAL

## 2020-07-20 VITALS
OXYGEN SATURATION: 98 % | RESPIRATION RATE: 16 BRPM | DIASTOLIC BLOOD PRESSURE: 96 MMHG | WEIGHT: 198.88 LBS | BODY MASS INDEX: 26.36 KG/M2 | HEART RATE: 63 BPM | HEIGHT: 73 IN | SYSTOLIC BLOOD PRESSURE: 158 MMHG

## 2020-07-20 DIAGNOSIS — I10 ESSENTIAL HYPERTENSION: Primary | ICD-10-CM

## 2020-07-20 DIAGNOSIS — E78.00 PURE HYPERCHOLESTEROLEMIA: ICD-10-CM

## 2020-07-20 DIAGNOSIS — Z12.5 PROSTATE CANCER SCREENING: ICD-10-CM

## 2020-07-20 DIAGNOSIS — N40.1 BENIGN PROSTATIC HYPERPLASIA WITH NOCTURIA: ICD-10-CM

## 2020-07-20 DIAGNOSIS — R35.1 BENIGN PROSTATIC HYPERPLASIA WITH NOCTURIA: ICD-10-CM

## 2020-07-20 LAB
ALBUMIN SERPL BCP-MCNC: 3.8 G/DL (ref 3.5–5.2)
ALP SERPL-CCNC: 74 U/L (ref 55–135)
ALT SERPL W/O P-5'-P-CCNC: 19 U/L (ref 10–44)
ANION GAP SERPL CALC-SCNC: 7 MMOL/L (ref 8–16)
AST SERPL-CCNC: 22 U/L (ref 10–40)
BILIRUB SERPL-MCNC: 1.8 MG/DL (ref 0.1–1)
BUN SERPL-MCNC: 11 MG/DL (ref 8–23)
CALCIUM SERPL-MCNC: 9 MG/DL (ref 8.7–10.5)
CHLORIDE SERPL-SCNC: 104 MMOL/L (ref 95–110)
CHOLEST SERPL-MCNC: 188 MG/DL (ref 120–199)
CHOLEST/HDLC SERPL: 2.5 {RATIO} (ref 2–5)
CO2 SERPL-SCNC: 29 MMOL/L (ref 23–29)
CREAT SERPL-MCNC: 0.9 MG/DL (ref 0.5–1.4)
ERYTHROCYTE [DISTWIDTH] IN BLOOD BY AUTOMATED COUNT: 12 % (ref 11.5–14.5)
EST. GFR  (AFRICAN AMERICAN): >60 ML/MIN/1.73 M^2
EST. GFR  (NON AFRICAN AMERICAN): >60 ML/MIN/1.73 M^2
GLUCOSE SERPL-MCNC: 103 MG/DL (ref 70–110)
HCT VFR BLD AUTO: 47.4 % (ref 40–54)
HDLC SERPL-MCNC: 76 MG/DL (ref 40–75)
HDLC SERPL: 40.4 % (ref 20–50)
HGB BLD-MCNC: 15.7 G/DL (ref 14–18)
IMM GRANULOCYTES # BLD AUTO: 0 K/UL (ref 0–0.04)
LDLC SERPL CALC-MCNC: 99.4 MG/DL (ref 63–159)
MCH RBC QN AUTO: 32.6 PG (ref 27–31)
MCHC RBC AUTO-ENTMCNC: 33.1 G/DL (ref 32–36)
MCV RBC AUTO: 99 FL (ref 82–98)
NEUTROPHILS # BLD AUTO: 2.2 K/UL (ref 1.8–7.7)
NONHDLC SERPL-MCNC: 112 MG/DL
PLATELET # BLD AUTO: 209 K/UL (ref 150–350)
PMV BLD AUTO: 9.7 FL (ref 9.2–12.9)
POTASSIUM SERPL-SCNC: 4.3 MMOL/L (ref 3.5–5.1)
PROT SERPL-MCNC: 6.5 G/DL (ref 6–8.4)
RBC # BLD AUTO: 4.81 M/UL (ref 4.6–6.2)
SODIUM SERPL-SCNC: 140 MMOL/L (ref 136–145)
TRIGL SERPL-MCNC: 63 MG/DL (ref 30–150)
TSH SERPL DL<=0.005 MIU/L-ACNC: 1.5 UIU/ML (ref 0.4–4)
WBC # BLD AUTO: 5.06 K/UL (ref 3.9–12.7)

## 2020-07-20 PROCEDURE — 80061 LIPID PANEL: CPT

## 2020-07-20 PROCEDURE — 1126F PR PAIN SEVERITY QUANTIFIED, NO PAIN PRESENT: ICD-10-PCS | Mod: S$GLB,,, | Performed by: INTERNAL MEDICINE

## 2020-07-20 PROCEDURE — 3008F PR BODY MASS INDEX (BMI) DOCUMENTED: ICD-10-PCS | Mod: CPTII,S$GLB,, | Performed by: INTERNAL MEDICINE

## 2020-07-20 PROCEDURE — 3077F PR MOST RECENT SYSTOLIC BLOOD PRESSURE >= 140 MM HG: ICD-10-PCS | Mod: CPTII,S$GLB,, | Performed by: INTERNAL MEDICINE

## 2020-07-20 PROCEDURE — 1126F AMNT PAIN NOTED NONE PRSNT: CPT | Mod: S$GLB,,, | Performed by: INTERNAL MEDICINE

## 2020-07-20 PROCEDURE — 1101F PT FALLS ASSESS-DOCD LE1/YR: CPT | Mod: CPTII,S$GLB,, | Performed by: INTERNAL MEDICINE

## 2020-07-20 PROCEDURE — 36415 COLL VENOUS BLD VENIPUNCTURE: CPT

## 2020-07-20 PROCEDURE — 3080F PR MOST RECENT DIASTOLIC BLOOD PRESSURE >= 90 MM HG: ICD-10-PCS | Mod: CPTII,S$GLB,, | Performed by: INTERNAL MEDICINE

## 2020-07-20 PROCEDURE — 1101F PR PT FALLS ASSESS DOC 0-1 FALLS W/OUT INJ PAST YR: ICD-10-PCS | Mod: CPTII,S$GLB,, | Performed by: INTERNAL MEDICINE

## 2020-07-20 PROCEDURE — 99999 PR PBB SHADOW E&M-EST. PATIENT-LVL IV: ICD-10-PCS | Mod: PBBFAC,,, | Performed by: INTERNAL MEDICINE

## 2020-07-20 PROCEDURE — 85027 COMPLETE CBC AUTOMATED: CPT

## 2020-07-20 PROCEDURE — 80053 COMPREHEN METABOLIC PANEL: CPT

## 2020-07-20 PROCEDURE — 3077F SYST BP >= 140 MM HG: CPT | Mod: CPTII,S$GLB,, | Performed by: INTERNAL MEDICINE

## 2020-07-20 PROCEDURE — 99213 PR OFFICE/OUTPT VISIT, EST, LEVL III, 20-29 MIN: ICD-10-PCS | Mod: S$GLB,,, | Performed by: INTERNAL MEDICINE

## 2020-07-20 PROCEDURE — 84443 ASSAY THYROID STIM HORMONE: CPT

## 2020-07-20 PROCEDURE — 1159F MED LIST DOCD IN RCRD: CPT | Mod: S$GLB,,, | Performed by: INTERNAL MEDICINE

## 2020-07-20 PROCEDURE — 3008F BODY MASS INDEX DOCD: CPT | Mod: CPTII,S$GLB,, | Performed by: INTERNAL MEDICINE

## 2020-07-20 PROCEDURE — 1159F PR MEDICATION LIST DOCUMENTED IN MEDICAL RECORD: ICD-10-PCS | Mod: S$GLB,,, | Performed by: INTERNAL MEDICINE

## 2020-07-20 PROCEDURE — 99213 OFFICE O/P EST LOW 20 MIN: CPT | Mod: S$GLB,,, | Performed by: INTERNAL MEDICINE

## 2020-07-20 PROCEDURE — 99999 PR PBB SHADOW E&M-EST. PATIENT-LVL IV: CPT | Mod: PBBFAC,,, | Performed by: INTERNAL MEDICINE

## 2020-07-20 PROCEDURE — 3080F DIAST BP >= 90 MM HG: CPT | Mod: CPTII,S$GLB,, | Performed by: INTERNAL MEDICINE

## 2020-07-21 ENCOUNTER — LAB VISIT (OUTPATIENT)
Dept: LAB | Facility: HOSPITAL | Age: 70
End: 2020-07-21
Attending: INTERNAL MEDICINE
Payer: COMMERCIAL

## 2020-07-21 DIAGNOSIS — Z12.5 SPECIAL SCREENING, PROSTATE CANCER: ICD-10-CM

## 2020-07-21 DIAGNOSIS — Z12.5 SPECIAL SCREENING, PROSTATE CANCER: Primary | ICD-10-CM

## 2020-07-21 LAB — COMPLEXED PSA SERPL-MCNC: 0.99 NG/ML (ref 0–4)

## 2020-07-21 PROCEDURE — 84153 ASSAY OF PSA TOTAL: CPT

## 2020-07-21 PROCEDURE — 36415 COLL VENOUS BLD VENIPUNCTURE: CPT

## 2020-08-06 ENCOUNTER — CLINICAL SUPPORT (OUTPATIENT)
Dept: AUDIOLOGY | Facility: CLINIC | Age: 70
End: 2020-08-06
Payer: COMMERCIAL

## 2020-08-06 ENCOUNTER — CLINICAL SUPPORT (OUTPATIENT)
Dept: INTERNAL MEDICINE | Facility: CLINIC | Age: 70
End: 2020-08-06
Payer: COMMERCIAL

## 2020-08-06 DIAGNOSIS — H90.3 SENSORINEURAL HEARING LOSS, BILATERAL: Primary | ICD-10-CM

## 2020-08-06 PROCEDURE — 99499 UNLISTED E&M SERVICE: CPT | Mod: S$GLB,,, | Performed by: AUDIOLOGIST

## 2020-08-06 PROCEDURE — 90715 TDAP VACCINE 7 YRS/> IM: CPT | Mod: S$GLB,,, | Performed by: INTERNAL MEDICINE

## 2020-08-06 PROCEDURE — 90715 TDAP VACCINE GREATER THAN OR EQUAL TO 7YO IM: ICD-10-PCS | Mod: S$GLB,,, | Performed by: INTERNAL MEDICINE

## 2020-08-06 PROCEDURE — 99499 NO LOS: ICD-10-PCS | Mod: S$GLB,,, | Performed by: AUDIOLOGIST

## 2020-08-06 PROCEDURE — 90471 IMMUNIZATION ADMIN: CPT | Mod: S$GLB,,, | Performed by: INTERNAL MEDICINE

## 2020-08-06 PROCEDURE — 90471 TDAP VACCINE GREATER THAN OR EQUAL TO 7YO IM: ICD-10-PCS | Mod: S$GLB,,, | Performed by: INTERNAL MEDICINE

## 2020-08-06 NOTE — PROGRESS NOTES
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 Today we reviewed the Resound Smart 3D blas and it's features. Dr. Munoz stated he hasn't been about to use the hearing aids in background noise settings like he wanted (I.e. restaurants) due to COVID-19, but stated he's going to start wearing them more at work. No adjustments were made. He will follow-up PRN.

## 2020-08-13 ENCOUNTER — OFFICE VISIT (OUTPATIENT)
Dept: PODIATRY | Facility: CLINIC | Age: 70
End: 2020-08-13
Payer: COMMERCIAL

## 2020-08-13 VITALS
SYSTOLIC BLOOD PRESSURE: 130 MMHG | DIASTOLIC BLOOD PRESSURE: 85 MMHG | WEIGHT: 197 LBS | HEIGHT: 73 IN | HEART RATE: 60 BPM | BODY MASS INDEX: 26.11 KG/M2

## 2020-08-13 DIAGNOSIS — M21.6X2 PRONATION OF BOTH FEET: ICD-10-CM

## 2020-08-13 DIAGNOSIS — M21.6X1 PRONATION OF BOTH FEET: ICD-10-CM

## 2020-08-13 DIAGNOSIS — M24.573 EQUINUS CONTRACTURE OF ANKLE: ICD-10-CM

## 2020-08-13 DIAGNOSIS — M79.672 LEFT FOOT PAIN: Primary | ICD-10-CM

## 2020-08-13 DIAGNOSIS — M19.072 ARTHRITIS OF FOOT, LEFT: ICD-10-CM

## 2020-08-13 PROCEDURE — 99999 PR PBB SHADOW E&M-EST. PATIENT-LVL IV: ICD-10-PCS | Mod: PBBFAC,,, | Performed by: PODIATRIST

## 2020-08-13 PROCEDURE — 99999 PR PBB SHADOW E&M-EST. PATIENT-LVL IV: CPT | Mod: PBBFAC,,, | Performed by: PODIATRIST

## 2020-08-13 PROCEDURE — 3079F DIAST BP 80-89 MM HG: CPT | Mod: CPTII,S$GLB,, | Performed by: PODIATRIST

## 2020-08-13 PROCEDURE — 3079F PR MOST RECENT DIASTOLIC BLOOD PRESSURE 80-89 MM HG: ICD-10-PCS | Mod: CPTII,S$GLB,, | Performed by: PODIATRIST

## 2020-08-13 PROCEDURE — 1126F AMNT PAIN NOTED NONE PRSNT: CPT | Mod: S$GLB,,, | Performed by: PODIATRIST

## 2020-08-13 PROCEDURE — 3008F PR BODY MASS INDEX (BMI) DOCUMENTED: ICD-10-PCS | Mod: CPTII,S$GLB,, | Performed by: PODIATRIST

## 2020-08-13 PROCEDURE — 3075F PR MOST RECENT SYSTOLIC BLOOD PRESS GE 130-139MM HG: ICD-10-PCS | Mod: CPTII,S$GLB,, | Performed by: PODIATRIST

## 2020-08-13 PROCEDURE — 1126F PR PAIN SEVERITY QUANTIFIED, NO PAIN PRESENT: ICD-10-PCS | Mod: S$GLB,,, | Performed by: PODIATRIST

## 2020-08-13 PROCEDURE — 1101F PR PT FALLS ASSESS DOC 0-1 FALLS W/OUT INJ PAST YR: ICD-10-PCS | Mod: CPTII,S$GLB,, | Performed by: PODIATRIST

## 2020-08-13 PROCEDURE — 99203 OFFICE O/P NEW LOW 30 MIN: CPT | Mod: S$GLB,,, | Performed by: PODIATRIST

## 2020-08-13 PROCEDURE — 3075F SYST BP GE 130 - 139MM HG: CPT | Mod: CPTII,S$GLB,, | Performed by: PODIATRIST

## 2020-08-13 PROCEDURE — 1101F PT FALLS ASSESS-DOCD LE1/YR: CPT | Mod: CPTII,S$GLB,, | Performed by: PODIATRIST

## 2020-08-13 PROCEDURE — 99203 PR OFFICE/OUTPT VISIT, NEW, LEVL III, 30-44 MIN: ICD-10-PCS | Mod: S$GLB,,, | Performed by: PODIATRIST

## 2020-08-13 PROCEDURE — 1159F MED LIST DOCD IN RCRD: CPT | Mod: S$GLB,,, | Performed by: PODIATRIST

## 2020-08-13 PROCEDURE — 3008F BODY MASS INDEX DOCD: CPT | Mod: CPTII,S$GLB,, | Performed by: PODIATRIST

## 2020-08-13 PROCEDURE — 1159F PR MEDICATION LIST DOCUMENTED IN MEDICAL RECORD: ICD-10-PCS | Mod: S$GLB,,, | Performed by: PODIATRIST

## 2020-08-13 NOTE — PATIENT INSTRUCTIONS
"Recommend [employer] tennis shoes and fit flops    Require 1-2" heel height at all times      Lower Body Exercises: Calf Stretch    This exercise both stretches and strengthens your lower body to help your back. Do the exercise as often as suggested by your healthcare provider. As you work out, dont rush or strain. Use an exercise mat, pillow, or folded towel to protect your knees and other sensitive areas.  · Face a wall 2 feet away. Step toward the wall with one foot.  · Place both palms on the wall and bend your front knee.  · Lean forward, keeping the back leg straight and the heel on the floor.  · Hold for 10 to 30 seconds. Switch legs.  Date Last Reviewed: 11/14/2015  © 3981-7576 [employer], [employer]. [address] [place] [address], [place] [address]. All rights reserved. This information is not intended as a substitute for professional medical care. Always follow your healthcare professional's instructions.        "

## 2020-08-14 NOTE — PROGRESS NOTES
"Subjective:      Patient ID: Jacques Munoz is a 70 y.o. male.    Chief Complaint: Foot Problem (Bilateral) and Flat Foot (Bilteral mostly left)    Presents today complaining of intermittent pain to the left foot over the past several years and is aggravated by extended periods of standing or walking.  Says that he does not have any significant pain today however he attend a trip not long ago were flip-flops which caused him several days of pain.  Relates he feels better when he wears a stable shoe with a higher heel and has padding underneath the heel to lifted up further.  History of obtaining previous orthotics from [facility] along [address].  Denies any trauma.  Pain is alleviated by rest    Vitals:    08/13/20 1544   BP: 130/85   Pulse: 60   Weight: 89.4 kg (197 lb)   Height: 6' 1" (1.854 m)   PainSc: 0-No pain      Past Medical History:   Diagnosis Date    BPH (benign prostatic hyperplasia)     High cholesterol     Hypertension     Kidney stone        Past Surgical History:   Procedure Laterality Date    ARTHROSCOPIC CHONDROPLASTY OF KNEE JOINT Left 11/22/2019    Procedure: ARTHROSCOPY, KNEE, WITH CHONDROPLASTY;  Surgeon: Bairon Salas MD;  Location: [facility] OR;  Service: Orthopedics;  Laterality: Left;    KNEE ARTHROSCOPY W/ MENISCECTOMY Left 11/22/2019    Procedure: ARTHROSCOPY, KNEE, WITH MENISCECTOMY;  Surgeon: Bairon Salas MD;  Location: [facility] OR;  Service: Orthopedics;  Laterality: Left;  Partial medial and lateral    TONSILLECTOMY         Family History   Problem Relation Age of Onset    Hypertension Mother     Hypertension Father     Melanoma Neg Hx        Social History     Socioeconomic History    Marital status:      Spouse name: Not on file    Number of children: Not on file    Years of education: Not on file    Highest education level: Not on file   Occupational History    Occupation: physician   Social Needs    Financial resource strain: Not on " file    Food insecurity     Worry: Not on file     Inability: Not on file    Transportation needs     Medical: Not on file     Non-medical: Not on file   Tobacco Use    Smoking status: Never Smoker    Smokeless tobacco: Never Used   Substance and Sexual Activity    Alcohol use: Yes    Drug use: No    Sexual activity: Yes     Partners: Female   Lifestyle    Physical activity     Days per week: Not on file     Minutes per session: Not on file    Stress: Not on file   Relationships    Social connections     Talks on phone: Not on file     Gets together: Not on file     Attends Yarsanism service: Not on file     Active member of club or organization: Not on file     Attends meetings of clubs or organizations: Not on file     Relationship status: Not on file   Other Topics Concern    Not on file   Social History Narrative    Not on file       Current Outpatient Medications   Medication Sig Dispense Refill    atorvastatin (LIPITOR) 20 MG tablet TAKE ONE TABLET BY MOUTH EVERY DAY 90 tablet 3    famotidine (PEPCID) 20 MG tablet Take 20 mg by mouth once daily.       fish oil-omega-3 fatty acids 300-1,000 mg capsule Take 2 g by mouth once daily.      fluticasone (FLONASE) 50 mcg/actuation nasal spray 1 spray by Each Nare route once daily. 1 Bottle prn    loratadine 10 mg Cap Take by mouth.      metoprolol succinate (TOPROL-XL) 50 MG 24 hr tablet Take 1 tablet (50 mg total) by mouth once daily. 90 tablet 3    zolpidem (AMBIEN) 5 MG Tab Take 1 tablet (5 mg total) by mouth nightly as needed. 30 tablet 4     No current facility-administered medications for this visit.        Review of patient's allergies indicates:  No Known Allergies      Review of Systems   Constitution: Negative for chills, fever and malaise/fatigue.   HENT: Negative for congestion and hearing loss.    Cardiovascular: Negative for chest pain and claudication.   Respiratory: Negative for cough and shortness of breath.    Skin: Negative for  color change, itching and poor wound healing.   Musculoskeletal: Positive for joint pain. Negative for back pain and muscle weakness.   Neurological: Negative for numbness and paresthesias.           Objective:      Physical Exam  Constitutional:       General: He is not in acute distress.     Appearance: Normal appearance. He is not ill-appearing.   Cardiovascular:      Pulses:           Dorsalis pedis pulses are 2+ on the right side and 2+ on the left side.        Posterior tibial pulses are 2+ on the right side and 2+ on the left side.      Comments: Normal hair growth distribution bilateral lower extremity    No significant lower extremity edema bilateral.    Skin temp is warm to foot bilateral.  Musculoskeletal:      Comments: Palpable enlargement of the navicular tuberosity left foot with slight pain on palpation.  No pain with active resisted inversion left foot palpable bony enlargement dorsal medial left midfoot with reduced midtarsal joint range of motion an overall forefoot varus noted.    Reducible gastrocnemius equinus bilateral left greater than right noting negative 10-15 degrees dorsiflexion left ankle with the knee extended.    Left foot noted to shane abduct a moderate amount with weight-bearing   Skin:     General: Skin is warm.      Capillary Refill: Capillary refill takes less than 2 seconds.      Findings: No ecchymosis or erythema.      Nails: There is no clubbing.     Neurological:      Mental Status: He is alert.               Assessment:       Encounter Diagnoses   Name Primary?    Left foot pain Yes    Equinus contracture of ankle     Pronation of both feet     Arthritis of foot, left          Plan:       Jacques DALE was seen today for foot problem and flat foot.    Diagnoses and all orders for this visit:    Left foot pain  -     ORTHOTIC DEVICE (DME)    Equinus contracture of ankle    Pronation of both feet  -     ORTHOTIC DEVICE (DME)    Arthritis of foot, left  -     ORTHOTIC DEVICE  (DME)      I counseled the patient on his conditions, their implications and medical management.    Discussed with the patient that he has significant equinus to the left ankle which is contributing to his compensated pronation left foot and increased pain during increased periods of standing or walking.  We did discuss conservative options such as avoiding flat shoes and barefoot walking recommending up to 2 in heel height at all times.  Recommended Hoka tennis shoes.  Patient currently wearing echo casual shoes which seem to help his feet plus he has over-the-counter soft accommodative insoles.  Also briefly discussed surgical intervention such as gastrocnemius recession left leg to help improve his ankle range of motion reduced the pronation left foot taken better tolerate shoes and orthotics.    Prescription given for custom-molded orthotics to neutral made of graphite or similar products for sports/dress orthotic.    RTC p.r.n. as discussed.    .

## 2020-08-21 ENCOUNTER — CLINICAL SUPPORT (OUTPATIENT)
Dept: RESEARCH | Facility: CLINIC | Age: 70
End: 2020-08-21
Payer: COMMERCIAL

## 2020-08-21 DIAGNOSIS — Z23 NEED FOR VACCINATION: Primary | ICD-10-CM

## 2020-08-21 PROCEDURE — 91300 COVID-19, MRNA, LNP-S, PF, 30 MCG/0.3 ML DOSE VACCINE: ICD-10-PCS | Mod: ,,, | Performed by: INTERNAL MEDICINE

## 2020-08-21 PROCEDURE — 99999 PR PBB SHADOW E&M-EST. PATIENT-LVL I: CPT | Mod: PBBFAC,,,

## 2020-08-21 PROCEDURE — 91300 COVID-19, MRNA, LNP-S, PF, 30 MCG/0.3 ML DOSE VACCINE: CPT | Mod: ,,, | Performed by: INTERNAL MEDICINE

## 2020-08-21 PROCEDURE — 99999 PR PBB SHADOW E&M-EST. PATIENT-LVL I: ICD-10-PCS | Mod: PBBFAC,,,

## 2020-08-21 NOTE — PROGRESS NOTES
Study title: A Phase 1/2/3. Placebo Controlled, Randomized, Observer-Blind, Dose-Finding Study to Describe the Safety, Tolerability, Immunogenicity and Potential Efficacy of SARS-COV-2 RNA Vaccine Candidates Against COVID-19 in Health Adults   IRB #: 2020.198  Sponsor: Pfizer   Sponsor's Protocol: [id number]  Site Number: [id number]  Subject ID: [id number]    Visit Assessments; 8/21/2020    Screening of inclusion/exclusion criteria evaluation for [id number] has been reviewed by Bozena Pino. At this time, Jacques Munoz meets all inclusion and does not meet any one of the exclusion criteria.   Screening procedures completed during this visit include the following:   Demographic data (including gender, age, ethnicity, date of birth);    Height and weight obtained;   Vital Signs;  o B/P: Automatic BP at 14:36- 155/093 Manual BP at 14:39- 138/088  o Temperature: 036.8  o Pulse: 075   Reviewed and confirmed medical history in the past 6 months;   Review and confirmed of concomitant medications in the past 6 months;   Contraceptive discussion with Jacques Munoz. Patient expressed full understanding of adequate contraceptive measures and will contact site immediately if any changes are made in contraceptives;   Was UPT completed? No  o If no, UPT was not performed because subject is a Male.     Physical exam deemed NOT necessary per P.I. discretion. I, Dr Oliver reviewed eligibility and agree with assessments. Subject will be randomized.

## 2020-08-21 NOTE — PROGRESS NOTES
Date consent signed: 8/21/2020    Study title: A Phase 1/2/3. Placebo Controlled, Randomized, Observer-Blind, Dose-Finding Study to Describe the Safety, Tolerability, Immunogenicity and Potential Efficacy of SARS-COV-2 RNA Vaccine Candidates Against COVID-19 in Health Adults   IRB #: 2020.198  Sponsor: Pfizer   Sponsor's Protocol: [id number]  Site Number: [phone]  Subject ID: [id number]    Informed Consent Process  Present for discussion: myself, patient, patient's wife, Shellie Gupta  Was the consent done electronically: yes     Prior to the Informed Consent (IC) being signed, or any protocol required testing, procedure, or intervention being performed, the following was done or discussed:  · Purpose of the Study, Qualifications to Participate: yes  · Study Design, Schedule and Procedures: yes  · Risks, Benefits, Alternative Treatments, Compensation and Costs: yes  · Confidentiality and HIPAA Authorization for Release of Medical Records for the research trial/subject's right/study related injury: yes  · Study related contact information: yes  · Voluntary Participation and Withdrawal from the research trial at any time: yes  · Patient has been offered the opportunity to ask questions regarding the study and all questions were answered satisfactorily: yes  · CRC and PI contact information given to patient: yes  · Signed copy given to patient: yes  · Copy in patient's chart and original uploaded to Epic: yes    Patient able to adequately summarize: the purpose of the study, the risks associated with the study, and all procedures, testing, and follow-ups associated with the study: yes    Jacques Moses Tammy electronically signed the informed consent form with an IRB approval date of 8/6/2020. Each slide of the eConsent form was reviewed with him and all questions answered satisfactorily. He then electronically signed the consent form, which was countersigned by the CRC on this day. A copy of the fully executed ICF was then  provided to the subject via e-mail. The original consent was electronically saved and uploaded to the [facility] EMR ([employer]) and filed appropriately.    After signing consent, patient signed the Medical Billing Release form and Registration Authorization form.     Person Obtaining Consent: Aman Armstrong

## 2020-09-11 ENCOUNTER — OFFICE VISIT (OUTPATIENT)
Dept: RESEARCH | Facility: CLINIC | Age: 70
End: 2020-09-11

## 2020-09-11 DIAGNOSIS — Z00.6 RESEARCH SUBJECT: Primary | ICD-10-CM

## 2020-09-11 DIAGNOSIS — Z23 NEED FOR VACCINATION: ICD-10-CM

## 2020-09-11 PROCEDURE — 99999 PR PBB SHADOW E&M-EST. PATIENT-LVL I: CPT | Mod: PBBFAC,,,

## 2020-09-11 PROCEDURE — 99999 PR PBB SHADOW E&M-EST. PATIENT-LVL I: ICD-10-PCS | Mod: PBBFAC,,,

## 2020-09-11 PROCEDURE — 91300 COVID-19, MRNA, LNP-S, PF, 30 MCG/0.3 ML DOSE VACCINE: ICD-10-PCS | Mod: S$GLB,,, | Performed by: INTERNAL MEDICINE

## 2020-09-11 PROCEDURE — 91300 COVID-19, MRNA, LNP-S, PF, 30 MCG/0.3 ML DOSE VACCINE: CPT | Mod: S$GLB,,, | Performed by: INTERNAL MEDICINE

## 2020-09-11 NOTE — PROGRESS NOTES
Study title: A Phase 1/2/3. Placebo Controlled, Randomized, Observer-Blind, Dose-Finding Study to Describe the Safety, Tolerability, Immunogenicity and Potential Efficacy of SARS-COV-2 RNA Vaccine Candidates Against COVID-19 in Health Adults   IRB #: 2020.198  Sponsor: Pfizer   Sponsor's Protocol: [id number]  Site Number: [id number]  Subject ID: [id number]    Visit Assessments; 9/11/2020    The Subject presented for Visit 2 appointment as previously scheduled. Present during the visit are , Wen Craig and participant, Jacques Munoz MD. Participant wishes to continue participation in the trial, understands participation is voluntary and can withdraw at any point during the trial.     Visit 2 procedures completed during this visit include the following:      Review of Inclusion and Exclusion to determine the Subject's continued eligibility for study participation. Patient continues to meet eligibility. PI/Sub-I confirms and agrees to continued eligibility.    Review of Delay Criteria to confirm that the Subject is eligible for their second vaccination during today's visit.    Reviewed of adverse events since the Subject's Screening Visit.    Review for changes in concomitant medications since Subject's Screening Visit.    Review for changes in Contraceptive with Jacques Munoz MD. If patient of childbearing potential or male capable of producing, adequate contraceptive refresher discussion occurred. Patient expressed full understanding of adequate contraceptive measures and will contact site immediately if any changes are made in contraceptives.    Was UPT completed? no  o UPT was not performed because subject is male    Per protocol, no physical required at V2 unless new or major health changes since last visit  or at PI discretion.     All visit assessment procedures have been completed according to protocol, IP order will be placed, and second vaccine will be prepared.

## 2020-09-16 ENCOUNTER — CLINICAL SUPPORT (OUTPATIENT)
Dept: URGENT CARE | Facility: CLINIC | Age: 70
End: 2020-09-16
Payer: COMMERCIAL

## 2020-09-16 DIAGNOSIS — Z03.818 ENCOUNTER FOR OBSERVATION FOR SUSPECTED EXPOSURE TO OTHER BIOLOGICAL AGENTS RULED OUT: ICD-10-CM

## 2020-09-16 LAB — SARS-COV-2 RNA RESP QL NAA+PROBE: NOT DETECTED

## 2020-09-16 PROCEDURE — U0003 INFECTIOUS AGENT DETECTION BY NUCLEIC ACID (DNA OR RNA); SEVERE ACUTE RESPIRATORY SYNDROME CORONAVIRUS 2 (SARS-COV-2) (CORONAVIRUS DISEASE [COVID-19]), AMPLIFIED PROBE TECHNIQUE, MAKING USE OF HIGH THROUGHPUT TECHNOLOGIES AS DESCRIBED BY CMS-2020-01-R: HCPCS

## 2020-09-16 PROCEDURE — 99211 OFF/OP EST MAY X REQ PHY/QHP: CPT | Mod: S$GLB,,, | Performed by: STUDENT IN AN ORGANIZED HEALTH CARE EDUCATION/TRAINING PROGRAM

## 2020-09-16 PROCEDURE — 99211 PR OFFICE/OUTPT VISIT, EST, LEVL I: ICD-10-PCS | Mod: S$GLB,,, | Performed by: STUDENT IN AN ORGANIZED HEALTH CARE EDUCATION/TRAINING PROGRAM

## 2020-09-29 ENCOUNTER — LAB VISIT (OUTPATIENT)
Dept: LAB | Facility: HOSPITAL | Age: 70
End: 2020-09-29
Attending: INTERNAL MEDICINE
Payer: COMMERCIAL

## 2020-09-29 LAB — SARS-COV-2 IGG SERPLBLD QL IA.RAPID: NEGATIVE

## 2020-09-29 PROCEDURE — 36415 COLL VENOUS BLD VENIPUNCTURE: CPT

## 2020-09-29 PROCEDURE — 86769 SARS-COV-2 COVID-19 ANTIBODY: CPT

## 2020-10-01 DIAGNOSIS — Z00.00 PREVENTATIVE HEALTH CARE: Primary | ICD-10-CM

## 2020-10-02 ENCOUNTER — LAB VISIT (OUTPATIENT)
Dept: LAB | Facility: HOSPITAL | Age: 70
End: 2020-10-02
Attending: INTERNAL MEDICINE
Payer: COMMERCIAL

## 2020-10-02 DIAGNOSIS — Z00.00 PREVENTATIVE HEALTH CARE: ICD-10-CM

## 2020-10-02 PROCEDURE — 86769 SARS-COV-2 COVID-19 ANTIBODY: CPT

## 2020-10-02 PROCEDURE — 30000890 MAYO MISCELLANEOUS TEST (REFLEX)

## 2020-10-05 LAB — MAYO MISCELLANEOUS RESULT (REF): NORMAL

## 2020-10-13 ENCOUNTER — RESEARCH ENCOUNTER (OUTPATIENT)
Dept: RESEARCH | Facility: CLINIC | Age: 70
End: 2020-10-13
Payer: COMMERCIAL

## 2020-10-13 NOTE — PROGRESS NOTES
Study title: A Phase 1/2/3. Placebo Controlled, Randomized, Observer-Blind, Dose-Finding Study to Describe the Safety, Tolerability, Immunogenicity and Potential Efficacy of SARS-COV-2 RNA Vaccine Candidates Against COVID-19 in Health Adults   IRB #: 2020.198  Sponsor: Pfizer   Sponsor's Protocol: [id number]  Site Number: [id number]  Subject ID: [id number]    Visit Assessments; 10/13/2020    The Subject presented for Visit 3 appointment as previously scheduled. Present during the visit are , Wen Stoll and participant, Jacques Munoz MD. Participant wishes to continue participation in the trial, understands participation is voluntary and can withdraw at any point during the trial.     Visit 3 procedures completed during this visit include the following:      GAVIOTA Raymundo -Reviewed of adverse events since the Subject's Screening Visit.    GAVIOTA Raymundo -Review for changes in concomitant medications since Subject's Screening Visit.    GAVIOTA Raymundo -Review for changes in Contraceptive with Jacques Munoz MD. If patient of childbearing potential or male capable of producing, adequate contraceptive refresher discussion occurred. Patient expressed full understanding of adequate contraceptive measures and will contact site immediately if any changes are made in contraceptives.        All visit assessment procedures have been completed according to protocol.

## 2020-11-23 DIAGNOSIS — F51.04 PSYCHOPHYSIOLOGICAL INSOMNIA: Primary | ICD-10-CM

## 2020-11-23 RX ORDER — ZALEPLON 10 MG/1
10 CAPSULE ORAL NIGHTLY
Qty: 30 CAPSULE | Refills: 0 | Status: SHIPPED | OUTPATIENT
Start: 2020-11-23 | End: 2020-12-24

## 2020-12-18 ENCOUNTER — RESEARCH ENCOUNTER (OUTPATIENT)
Dept: RESEARCH | Facility: HOSPITAL | Age: 70
End: 2020-12-18

## 2020-12-18 NOTE — PROGRESS NOTES
Study title: A Phase 1/2/3. Placebo Controlled, Randomized, Observer-Blind, Dose-Finding Study to Describe the Safety, Tolerability, Immunogenicity and Potential Efficacy of SARS-COV-2 RNA Vaccine Candidates Against COVID-19 in Healthy Adults   IRB #: 2020.198  Sponsor: Pfizer   Sponsor's Protocol: [id number]  Site Number: [id number]  Subject ID: [id number]     Subject contacted site regarding Pfizer's COVID-19 Vaccine Emergency Use Authorization (EUA). Site explained the process of unbinding at two potential time points. Jacques Munoz MD stated he understands.     Does subject wish to be unblinded? yes    Jacques Munoz MD was informed that unblinded site staff will perform unblinding procedures and site will follow up shortly.

## 2020-12-18 NOTE — PROGRESS NOTES
Study title: A Phase 1/2/3. Placebo Controlled, Randomized, Observer-Blind, Dose-Finding Study to Describe the Safety, Tolerability, Immunogenicity and Potential Efficacy of SARS-COV-2 RNA Vaccine Candidates Against COVID-19 in Health Adults   IRB #: 2020.198  Sponsor: Pfizer   Sponsor's Protocol: [id number]  Site Number: [id number]  Subject ID: [id number]    Site follow up phone conversation about Emergency Use Authorization Initial Contact. Based on previous conversation, Jacques Munoz MD wishes to be unblinded. Site discussed that subject received [id number] at Vaccination 1/2.    Participant is willing to return for Vaccination 3? yes   Participant is: eligible and NOT confirmed to have received only placebo at Vaccination 1/2    Vaccination 3 scheduled? No   Date: N/A    If subject received [id number] at Vaccination 1/2:   Subject voices understanding that they will remain in study and follow the original schedule of events. yes

## 2021-01-25 DIAGNOSIS — Z00.00 PREVENTATIVE HEALTH CARE: Primary | ICD-10-CM

## 2021-01-26 DIAGNOSIS — E78.00 PURE HYPERCHOLESTEROLEMIA: ICD-10-CM

## 2021-01-26 DIAGNOSIS — I10 ESSENTIAL HYPERTENSION: ICD-10-CM

## 2021-01-26 RX ORDER — METOPROLOL SUCCINATE 50 MG/1
50 TABLET, EXTENDED RELEASE ORAL DAILY
Qty: 90 TABLET | Refills: 3 | Status: SHIPPED | OUTPATIENT
Start: 2021-01-26 | End: 2022-01-18 | Stop reason: SDUPTHER

## 2021-01-26 RX ORDER — ATORVASTATIN CALCIUM 20 MG/1
TABLET, FILM COATED ORAL
Qty: 90 TABLET | Refills: 3 | Status: SHIPPED | OUTPATIENT
Start: 2021-01-26 | End: 2022-01-18 | Stop reason: SDUPTHER

## 2021-01-29 ENCOUNTER — LAB VISIT (OUTPATIENT)
Dept: LAB | Facility: HOSPITAL | Age: 71
End: 2021-01-29
Attending: INTERNAL MEDICINE
Payer: COMMERCIAL

## 2021-01-29 DIAGNOSIS — Z00.00 PREVENTATIVE HEALTH CARE: ICD-10-CM

## 2021-01-29 PROCEDURE — 86769 SARS-COV-2 COVID-19 ANTIBODY: CPT

## 2021-01-30 LAB — MAYO MISCELLANEOUS RESULT (REF): NORMAL

## 2021-03-09 ENCOUNTER — RESEARCH ENCOUNTER (OUTPATIENT)
Dept: RESEARCH | Facility: CLINIC | Age: 71
End: 2021-03-09

## 2021-05-10 DIAGNOSIS — J01.90 ACUTE BACTERIAL SINUSITIS: Primary | ICD-10-CM

## 2021-05-10 DIAGNOSIS — B96.89 ACUTE BACTERIAL SINUSITIS: Primary | ICD-10-CM

## 2021-05-10 RX ORDER — LEVOFLOXACIN 500 MG/1
500 TABLET, FILM COATED ORAL DAILY
Qty: 7 TABLET | Refills: 0 | Status: SHIPPED | OUTPATIENT
Start: 2021-05-10 | End: 2021-05-18

## 2021-05-11 NOTE — PROGRESS NOTES
HPI     wu referral    Pt here for K-abrasion OS;  PT states OS is feeling much better. Pt has a bandage contact lens in OS.    Meds: Vigamox tid os    Last edited by Viv Vasquez MD on 6/2/2017  3:03 PM. (History)            Assessment /Plan     For exam results, see Encounter Report.    Herpes keratitis of left eye    Other orders  -     valacyclovir (VALTREX) 500 MG tablet; Take 1 tablet (500 mg total) by mouth 3 (three) times daily.  Dispense: 30 tablet; Refill: 0      HSV OS  - 1st episode  - valtrex as above, BCL d/c'ed today. Okay to stop vigamox.    F/up as needed, discussed need for prophylactic tx if recurrent dz occurs.                  Double Island Pedicle Flap Text: The defect edges were debeveled with a #15 scalpel blade.  Given the location of the defect, shape of the defect and the proximity to free margins a double island pedicle advancement flap was deemed most appropriate.  Using a sterile surgical marker, an appropriate advancement flap was drawn incorporating the defect, outlining the appropriate donor tissue and placing the expected incisions within the relaxed skin tension lines where possible.    The area thus outlined was incised deep to adipose tissue with a #15 scalpel blade.  The skin margins were undermined to an appropriate distance in all directions around the primary defect and laterally outward around the island pedicle utilizing iris scissors.  There was minimal undermining beneath the pedicle flap.

## 2021-05-24 DIAGNOSIS — F51.01 PRIMARY INSOMNIA: Primary | ICD-10-CM

## 2021-05-24 RX ORDER — ZALEPLON 10 MG/1
10 CAPSULE ORAL NIGHTLY PRN
Qty: 30 CAPSULE | Refills: 5 | Status: SHIPPED | OUTPATIENT
Start: 2021-05-24 | End: 2021-06-25

## 2021-07-12 DIAGNOSIS — Z12.11 ENCOUNTER FOR SCREENING COLONOSCOPY: Primary | ICD-10-CM

## 2021-07-13 ENCOUNTER — PATIENT MESSAGE (OUTPATIENT)
Dept: HEMATOLOGY/ONCOLOGY | Facility: CLINIC | Age: 71
End: 2021-07-13

## 2021-07-14 ENCOUNTER — PATIENT MESSAGE (OUTPATIENT)
Dept: HEMATOLOGY/ONCOLOGY | Facility: CLINIC | Age: 71
End: 2021-07-14

## 2021-07-14 ENCOUNTER — TELEPHONE (OUTPATIENT)
Dept: ENDOSCOPY | Facility: HOSPITAL | Age: 71
End: 2021-07-14

## 2021-07-14 ENCOUNTER — TELEPHONE (OUTPATIENT)
Dept: GASTROENTEROLOGY | Facility: CLINIC | Age: 71
End: 2021-07-14

## 2021-07-14 DIAGNOSIS — Z12.11 ENCOUNTER FOR SCREENING COLONOSCOPY: Primary | ICD-10-CM

## 2021-07-14 DIAGNOSIS — Z12.5 SPECIAL SCREENING, PROSTATE CANCER: Primary | ICD-10-CM

## 2021-07-21 ENCOUNTER — TELEPHONE (OUTPATIENT)
Dept: RESEARCH | Facility: CLINIC | Age: 71
End: 2021-07-21

## 2021-07-27 DIAGNOSIS — I10 ESSENTIAL HYPERTENSION: Primary | ICD-10-CM

## 2021-07-29 ENCOUNTER — LAB VISIT (OUTPATIENT)
Dept: LAB | Facility: HOSPITAL | Age: 71
End: 2021-07-29
Attending: INTERNAL MEDICINE
Payer: COMMERCIAL

## 2021-07-29 DIAGNOSIS — I10 ESSENTIAL HYPERTENSION: ICD-10-CM

## 2021-07-29 DIAGNOSIS — Z12.5 SPECIAL SCREENING, PROSTATE CANCER: ICD-10-CM

## 2021-07-29 LAB
ALBUMIN SERPL BCP-MCNC: 3.6 G/DL (ref 3.5–5.2)
ALP SERPL-CCNC: 73 U/L (ref 55–135)
ALT SERPL W/O P-5'-P-CCNC: 24 U/L (ref 10–44)
ANION GAP SERPL CALC-SCNC: 6 MMOL/L (ref 8–16)
AST SERPL-CCNC: 24 U/L (ref 10–40)
BILIRUB SERPL-MCNC: 1.8 MG/DL (ref 0.1–1)
BUN SERPL-MCNC: 11 MG/DL (ref 8–23)
CALCIUM SERPL-MCNC: 9.1 MG/DL (ref 8.7–10.5)
CHLORIDE SERPL-SCNC: 108 MMOL/L (ref 95–110)
CHOLEST SERPL-MCNC: 190 MG/DL (ref 120–199)
CHOLEST/HDLC SERPL: 2.5 {RATIO} (ref 2–5)
CO2 SERPL-SCNC: 28 MMOL/L (ref 23–29)
COMPLEXED PSA SERPL-MCNC: 1 NG/ML (ref 0–4)
CREAT SERPL-MCNC: 0.9 MG/DL (ref 0.5–1.4)
ERYTHROCYTE [DISTWIDTH] IN BLOOD BY AUTOMATED COUNT: 12.7 % (ref 11.5–14.5)
EST. GFR  (AFRICAN AMERICAN): >60 ML/MIN/1.73 M^2
EST. GFR  (NON AFRICAN AMERICAN): >60 ML/MIN/1.73 M^2
GLUCOSE SERPL-MCNC: 94 MG/DL (ref 70–110)
HCT VFR BLD AUTO: 43.7 % (ref 40–54)
HDLC SERPL-MCNC: 76 MG/DL (ref 40–75)
HDLC SERPL: 40 % (ref 20–50)
HGB BLD-MCNC: 15.3 G/DL (ref 14–18)
IMM GRANULOCYTES # BLD AUTO: 0.01 K/UL (ref 0–0.04)
LDLC SERPL CALC-MCNC: 102.4 MG/DL (ref 63–159)
MCH RBC QN AUTO: 33.7 PG (ref 27–31)
MCHC RBC AUTO-ENTMCNC: 35 G/DL (ref 32–36)
MCV RBC AUTO: 96 FL (ref 82–98)
NEUTROPHILS # BLD AUTO: 2.5 K/UL (ref 1.8–7.7)
NONHDLC SERPL-MCNC: 114 MG/DL
PLATELET # BLD AUTO: 197 K/UL (ref 150–450)
PMV BLD AUTO: 9.8 FL (ref 9.2–12.9)
POTASSIUM SERPL-SCNC: 4.3 MMOL/L (ref 3.5–5.1)
PROT SERPL-MCNC: 6.2 G/DL (ref 6–8.4)
RBC # BLD AUTO: 4.54 M/UL (ref 4.6–6.2)
SODIUM SERPL-SCNC: 142 MMOL/L (ref 136–145)
TRIGL SERPL-MCNC: 58 MG/DL (ref 30–150)
TSH SERPL DL<=0.005 MIU/L-ACNC: 1.76 UIU/ML (ref 0.4–4)
WBC # BLD AUTO: 5.46 K/UL (ref 3.9–12.7)

## 2021-07-29 PROCEDURE — 80061 LIPID PANEL: CPT | Performed by: INTERNAL MEDICINE

## 2021-07-29 PROCEDURE — 80053 COMPREHEN METABOLIC PANEL: CPT | Performed by: INTERNAL MEDICINE

## 2021-07-29 PROCEDURE — 85027 COMPLETE CBC AUTOMATED: CPT | Performed by: INTERNAL MEDICINE

## 2021-07-29 PROCEDURE — 86769 SARS-COV-2 COVID-19 ANTIBODY: CPT | Performed by: INTERNAL MEDICINE

## 2021-07-29 PROCEDURE — 36415 COLL VENOUS BLD VENIPUNCTURE: CPT | Performed by: INTERNAL MEDICINE

## 2021-07-29 PROCEDURE — 84153 ASSAY OF PSA TOTAL: CPT | Performed by: INTERNAL MEDICINE

## 2021-07-29 PROCEDURE — 84443 ASSAY THYROID STIM HORMONE: CPT | Performed by: INTERNAL MEDICINE

## 2021-07-30 ENCOUNTER — RESEARCH ENCOUNTER (OUTPATIENT)
Dept: RESEARCH | Facility: CLINIC | Age: 71
End: 2021-07-30
Payer: COMMERCIAL

## 2021-07-31 LAB
SARS-COV-2 IGG SERPL IA-ACNC: 326 AU/ML
SARS-COV-2 IGG SERPL QL IA: POSITIVE

## 2021-08-03 ENCOUNTER — OFFICE VISIT (OUTPATIENT)
Dept: HEMATOLOGY/ONCOLOGY | Facility: CLINIC | Age: 71
End: 2021-08-03
Payer: COMMERCIAL

## 2021-08-03 VITALS
SYSTOLIC BLOOD PRESSURE: 176 MMHG | HEIGHT: 73 IN | DIASTOLIC BLOOD PRESSURE: 101 MMHG | HEART RATE: 59 BPM | BODY MASS INDEX: 26.27 KG/M2 | TEMPERATURE: 98 F | WEIGHT: 198.19 LBS

## 2021-08-03 DIAGNOSIS — I10 ESSENTIAL HYPERTENSION: Primary | ICD-10-CM

## 2021-08-03 DIAGNOSIS — E78.00 PURE HYPERCHOLESTEROLEMIA: ICD-10-CM

## 2021-08-03 PROCEDURE — 1160F RVW MEDS BY RX/DR IN RCRD: CPT | Mod: CPTII,S$GLB,, | Performed by: INTERNAL MEDICINE

## 2021-08-03 PROCEDURE — 3080F PR MOST RECENT DIASTOLIC BLOOD PRESSURE >= 90 MM HG: ICD-10-PCS | Mod: CPTII,S$GLB,, | Performed by: INTERNAL MEDICINE

## 2021-08-03 PROCEDURE — 3288F PR FALLS RISK ASSESSMENT DOCUMENTED: ICD-10-PCS | Mod: CPTII,S$GLB,, | Performed by: INTERNAL MEDICINE

## 2021-08-03 PROCEDURE — 3077F SYST BP >= 140 MM HG: CPT | Mod: CPTII,S$GLB,, | Performed by: INTERNAL MEDICINE

## 2021-08-03 PROCEDURE — 99999 PR PBB SHADOW E&M-EST. PATIENT-LVL III: CPT | Mod: PBBFAC,,, | Performed by: INTERNAL MEDICINE

## 2021-08-03 PROCEDURE — 3008F BODY MASS INDEX DOCD: CPT | Mod: CPTII,S$GLB,, | Performed by: INTERNAL MEDICINE

## 2021-08-03 PROCEDURE — 1101F PR PT FALLS ASSESS DOC 0-1 FALLS W/OUT INJ PAST YR: ICD-10-PCS | Mod: CPTII,S$GLB,, | Performed by: INTERNAL MEDICINE

## 2021-08-03 PROCEDURE — 1160F PR REVIEW ALL MEDS BY PRESCRIBER/CLIN PHARMACIST DOCUMENTED: ICD-10-PCS | Mod: CPTII,S$GLB,, | Performed by: INTERNAL MEDICINE

## 2021-08-03 PROCEDURE — 1126F PR PAIN SEVERITY QUANTIFIED, NO PAIN PRESENT: ICD-10-PCS | Mod: CPTII,S$GLB,, | Performed by: INTERNAL MEDICINE

## 2021-08-03 PROCEDURE — 3080F DIAST BP >= 90 MM HG: CPT | Mod: CPTII,S$GLB,, | Performed by: INTERNAL MEDICINE

## 2021-08-03 PROCEDURE — 3008F PR BODY MASS INDEX (BMI) DOCUMENTED: ICD-10-PCS | Mod: CPTII,S$GLB,, | Performed by: INTERNAL MEDICINE

## 2021-08-03 PROCEDURE — 99999 PR PBB SHADOW E&M-EST. PATIENT-LVL III: ICD-10-PCS | Mod: PBBFAC,,, | Performed by: INTERNAL MEDICINE

## 2021-08-03 PROCEDURE — 1101F PT FALLS ASSESS-DOCD LE1/YR: CPT | Mod: CPTII,S$GLB,, | Performed by: INTERNAL MEDICINE

## 2021-08-03 PROCEDURE — 99213 PR OFFICE/OUTPT VISIT, EST, LEVL III, 20-29 MIN: ICD-10-PCS | Mod: S$GLB,,, | Performed by: INTERNAL MEDICINE

## 2021-08-03 PROCEDURE — 1126F AMNT PAIN NOTED NONE PRSNT: CPT | Mod: CPTII,S$GLB,, | Performed by: INTERNAL MEDICINE

## 2021-08-03 PROCEDURE — 3288F FALL RISK ASSESSMENT DOCD: CPT | Mod: CPTII,S$GLB,, | Performed by: INTERNAL MEDICINE

## 2021-08-03 PROCEDURE — 1159F MED LIST DOCD IN RCRD: CPT | Mod: CPTII,S$GLB,, | Performed by: INTERNAL MEDICINE

## 2021-08-03 PROCEDURE — 3077F PR MOST RECENT SYSTOLIC BLOOD PRESSURE >= 140 MM HG: ICD-10-PCS | Mod: CPTII,S$GLB,, | Performed by: INTERNAL MEDICINE

## 2021-08-03 PROCEDURE — 99213 OFFICE O/P EST LOW 20 MIN: CPT | Mod: S$GLB,,, | Performed by: INTERNAL MEDICINE

## 2021-08-03 PROCEDURE — 1159F PR MEDICATION LIST DOCUMENTED IN MEDICAL RECORD: ICD-10-PCS | Mod: CPTII,S$GLB,, | Performed by: INTERNAL MEDICINE

## 2021-08-10 ENCOUNTER — TELEPHONE (OUTPATIENT)
Dept: GASTROENTEROLOGY | Facility: CLINIC | Age: 71
End: 2021-08-10

## 2021-08-12 ENCOUNTER — TELEPHONE (OUTPATIENT)
Dept: ENDOSCOPY | Facility: HOSPITAL | Age: 71
End: 2021-08-12

## 2021-08-12 DIAGNOSIS — Z00.00 PREVENTATIVE HEALTH CARE: Primary | ICD-10-CM

## 2021-08-12 DIAGNOSIS — Z01.812 PRE-PROCEDURE LAB EXAM: Primary | ICD-10-CM

## 2021-08-13 ENCOUNTER — TELEPHONE (OUTPATIENT)
Dept: ENDOSCOPY | Facility: HOSPITAL | Age: 71
End: 2021-08-13

## 2021-08-13 ENCOUNTER — PATIENT MESSAGE (OUTPATIENT)
Dept: ENDOSCOPY | Facility: HOSPITAL | Age: 71
End: 2021-08-13

## 2021-08-13 ENCOUNTER — LAB VISIT (OUTPATIENT)
Dept: LAB | Facility: HOSPITAL | Age: 71
End: 2021-08-13
Attending: INTERNAL MEDICINE
Payer: COMMERCIAL

## 2021-08-13 DIAGNOSIS — Z01.812 PRE-PROCEDURE LAB EXAM: ICD-10-CM

## 2021-08-13 DIAGNOSIS — Z00.00 PREVENTATIVE HEALTH CARE: ICD-10-CM

## 2021-08-13 DIAGNOSIS — Z12.11 SPECIAL SCREENING FOR MALIGNANT NEOPLASMS, COLON: Primary | ICD-10-CM

## 2021-08-13 LAB
SARS-COV-2 IGG SERPL IA-ACNC: 301.5 AU/ML
SARS-COV-2 IGG SERPL QL IA: POSITIVE

## 2021-08-13 PROCEDURE — 86769 SARS-COV-2 COVID-19 ANTIBODY: CPT | Performed by: INTERNAL MEDICINE

## 2021-08-13 PROCEDURE — 36415 COLL VENOUS BLD VENIPUNCTURE: CPT | Performed by: INTERNAL MEDICINE

## 2021-08-13 RX ORDER — SODIUM, POTASSIUM,MAG SULFATES 17.5-3.13G
1 SOLUTION, RECONSTITUTED, ORAL ORAL DAILY
Qty: 354 ML | Refills: 0 | Status: SHIPPED | OUTPATIENT
Start: 2021-08-13 | End: 2021-08-15

## 2021-08-13 RX ORDER — SODIUM, POTASSIUM,MAG SULFATES 17.5-3.13G
1 SOLUTION, RECONSTITUTED, ORAL ORAL DAILY
Qty: 1 KIT | Refills: 0 | Status: SHIPPED | OUTPATIENT
Start: 2021-08-13 | End: 2021-08-13

## 2021-08-19 ENCOUNTER — RESEARCH ENCOUNTER (OUTPATIENT)
Dept: RESEARCH | Facility: CLINIC | Age: 71
End: 2021-08-19

## 2021-09-01 DIAGNOSIS — J32.9 SINUSITIS, UNSPECIFIED CHRONICITY, UNSPECIFIED LOCATION: Primary | ICD-10-CM

## 2021-09-01 RX ORDER — METHYLPREDNISOLONE 4 MG/1
TABLET ORAL
Qty: 1 PACKAGE | Refills: 0 | Status: SHIPPED | OUTPATIENT
Start: 2021-09-01 | End: 2021-09-22

## 2021-09-08 ENCOUNTER — RESEARCH ENCOUNTER (OUTPATIENT)
Dept: RESEARCH | Facility: HOSPITAL | Age: 71
End: 2021-09-08

## 2021-09-29 ENCOUNTER — RESEARCH ENCOUNTER (OUTPATIENT)
Dept: RESEARCH | Facility: HOSPITAL | Age: 71
End: 2021-09-29

## 2021-09-29 ENCOUNTER — TELEPHONE (OUTPATIENT)
Dept: RESEARCH | Facility: CLINIC | Age: 71
End: 2021-09-29

## 2021-10-04 ENCOUNTER — OFFICE VISIT (OUTPATIENT)
Dept: OPHTHALMOLOGY | Facility: CLINIC | Age: 71
End: 2021-10-04
Payer: COMMERCIAL

## 2021-10-04 DIAGNOSIS — H53.10 SUBJECTIVE VISUAL DISTURBANCE OF BOTH EYES: ICD-10-CM

## 2021-10-04 DIAGNOSIS — H04.123 INSUFFICIENCY OF TEAR FILM OF BOTH EYES: ICD-10-CM

## 2021-10-04 DIAGNOSIS — H25.13 NUCLEAR SCLEROTIC CATARACT OF BOTH EYES: Primary | ICD-10-CM

## 2021-10-04 PROCEDURE — 3288F FALL RISK ASSESSMENT DOCD: CPT | Mod: CPTII,S$GLB,, | Performed by: OPHTHALMOLOGY

## 2021-10-04 PROCEDURE — 1160F RVW MEDS BY RX/DR IN RCRD: CPT | Mod: CPTII,S$GLB,, | Performed by: OPHTHALMOLOGY

## 2021-10-04 PROCEDURE — 1160F PR REVIEW ALL MEDS BY PRESCRIBER/CLIN PHARMACIST DOCUMENTED: ICD-10-PCS | Mod: CPTII,S$GLB,, | Performed by: OPHTHALMOLOGY

## 2021-10-04 PROCEDURE — 1126F AMNT PAIN NOTED NONE PRSNT: CPT | Mod: CPTII,S$GLB,, | Performed by: OPHTHALMOLOGY

## 2021-10-04 PROCEDURE — 1159F MED LIST DOCD IN RCRD: CPT | Mod: CPTII,S$GLB,, | Performed by: OPHTHALMOLOGY

## 2021-10-04 PROCEDURE — 99214 PR OFFICE/OUTPT VISIT, EST, LEVL IV, 30-39 MIN: ICD-10-PCS | Mod: S$GLB,,, | Performed by: OPHTHALMOLOGY

## 2021-10-04 PROCEDURE — 1101F PT FALLS ASSESS-DOCD LE1/YR: CPT | Mod: CPTII,S$GLB,, | Performed by: OPHTHALMOLOGY

## 2021-10-04 PROCEDURE — 99214 OFFICE O/P EST MOD 30 MIN: CPT | Mod: S$GLB,,, | Performed by: OPHTHALMOLOGY

## 2021-10-04 PROCEDURE — 1159F PR MEDICATION LIST DOCUMENTED IN MEDICAL RECORD: ICD-10-PCS | Mod: CPTII,S$GLB,, | Performed by: OPHTHALMOLOGY

## 2021-10-04 PROCEDURE — 99999 PR PBB SHADOW E&M-EST. PATIENT-LVL III: CPT | Mod: PBBFAC,,, | Performed by: OPHTHALMOLOGY

## 2021-10-04 PROCEDURE — 1101F PR PT FALLS ASSESS DOC 0-1 FALLS W/OUT INJ PAST YR: ICD-10-PCS | Mod: CPTII,S$GLB,, | Performed by: OPHTHALMOLOGY

## 2021-10-04 PROCEDURE — 99999 PR PBB SHADOW E&M-EST. PATIENT-LVL III: ICD-10-PCS | Mod: PBBFAC,,, | Performed by: OPHTHALMOLOGY

## 2021-10-04 PROCEDURE — 3288F PR FALLS RISK ASSESSMENT DOCUMENTED: ICD-10-PCS | Mod: CPTII,S$GLB,, | Performed by: OPHTHALMOLOGY

## 2021-10-04 PROCEDURE — 1126F PR PAIN SEVERITY QUANTIFIED, NO PAIN PRESENT: ICD-10-PCS | Mod: CPTII,S$GLB,, | Performed by: OPHTHALMOLOGY

## 2021-10-08 ENCOUNTER — RESEARCH ENCOUNTER (OUTPATIENT)
Dept: RESEARCH | Facility: HOSPITAL | Age: 71
End: 2021-10-08

## 2021-10-08 ENCOUNTER — TELEPHONE (OUTPATIENT)
Dept: RESEARCH | Facility: CLINIC | Age: 71
End: 2021-10-08

## 2021-10-11 ENCOUNTER — OFFICE VISIT (OUTPATIENT)
Dept: DERMATOLOGY | Facility: CLINIC | Age: 71
End: 2021-10-11
Payer: COMMERCIAL

## 2021-10-11 DIAGNOSIS — D18.01 ANGIOMA OF SKIN: ICD-10-CM

## 2021-10-11 DIAGNOSIS — Z12.83 SCREENING EXAM FOR SKIN CANCER: ICD-10-CM

## 2021-10-11 DIAGNOSIS — L82.1 SK (SEBORRHEIC KERATOSIS): ICD-10-CM

## 2021-10-11 DIAGNOSIS — D48.5 NEOPLASM OF UNCERTAIN BEHAVIOR OF SKIN: Primary | ICD-10-CM

## 2021-10-11 PROBLEM — B76.9: Status: ACTIVE | Noted: 2021-10-11

## 2021-10-11 PROBLEM — B76.9: Status: RESOLVED | Noted: 2021-10-11 | Resolved: 2021-10-11

## 2021-10-11 PROCEDURE — 99213 PR OFFICE/OUTPT VISIT, EST, LEVL III, 20-29 MIN: ICD-10-PCS | Mod: 25,S$GLB,, | Performed by: DERMATOLOGY

## 2021-10-11 PROCEDURE — 17110 DESTRUCTION B9 LES UP TO 14: CPT | Mod: S$GLB,,, | Performed by: DERMATOLOGY

## 2021-10-11 PROCEDURE — 99213 OFFICE O/P EST LOW 20 MIN: CPT | Mod: 25,S$GLB,, | Performed by: DERMATOLOGY

## 2021-10-11 PROCEDURE — 1126F PR PAIN SEVERITY QUANTIFIED, NO PAIN PRESENT: ICD-10-PCS | Mod: CPTII,S$GLB,, | Performed by: DERMATOLOGY

## 2021-10-11 PROCEDURE — 88342 IMHCHEM/IMCYTCHM 1ST ANTB: CPT | Mod: 26,,, | Performed by: PATHOLOGY

## 2021-10-11 PROCEDURE — 99999 PR PBB SHADOW E&M-EST. PATIENT-LVL III: ICD-10-PCS | Mod: PBBFAC,,, | Performed by: DERMATOLOGY

## 2021-10-11 PROCEDURE — 88305 TISSUE EXAM BY PATHOLOGIST: CPT | Performed by: PATHOLOGY

## 2021-10-11 PROCEDURE — 88342 IMHCHEM/IMCYTCHM 1ST ANTB: CPT | Performed by: PATHOLOGY

## 2021-10-11 PROCEDURE — 1126F AMNT PAIN NOTED NONE PRSNT: CPT | Mod: CPTII,S$GLB,, | Performed by: DERMATOLOGY

## 2021-10-11 PROCEDURE — 88342 CHG IMMUNOCYTOCHEMISTRY: ICD-10-PCS | Mod: 26,,, | Performed by: PATHOLOGY

## 2021-10-11 PROCEDURE — 1160F PR REVIEW ALL MEDS BY PRESCRIBER/CLIN PHARMACIST DOCUMENTED: ICD-10-PCS | Mod: CPTII,S$GLB,, | Performed by: DERMATOLOGY

## 2021-10-11 PROCEDURE — 88305 TISSUE EXAM BY PATHOLOGIST: CPT | Mod: 26,,, | Performed by: PATHOLOGY

## 2021-10-11 PROCEDURE — 1159F PR MEDICATION LIST DOCUMENTED IN MEDICAL RECORD: ICD-10-PCS | Mod: CPTII,S$GLB,, | Performed by: DERMATOLOGY

## 2021-10-11 PROCEDURE — 88305 TISSUE EXAM BY PATHOLOGIST: ICD-10-PCS | Mod: 26,,, | Performed by: PATHOLOGY

## 2021-10-11 PROCEDURE — 17110 PR DESTRUCTION BENIGN LESIONS UP TO 14: ICD-10-PCS | Mod: S$GLB,,, | Performed by: DERMATOLOGY

## 2021-10-11 PROCEDURE — 1101F PR PT FALLS ASSESS DOC 0-1 FALLS W/OUT INJ PAST YR: ICD-10-PCS | Mod: CPTII,S$GLB,, | Performed by: DERMATOLOGY

## 2021-10-11 PROCEDURE — 3288F PR FALLS RISK ASSESSMENT DOCUMENTED: ICD-10-PCS | Mod: CPTII,S$GLB,, | Performed by: DERMATOLOGY

## 2021-10-11 PROCEDURE — 99999 PR PBB SHADOW E&M-EST. PATIENT-LVL III: CPT | Mod: PBBFAC,,, | Performed by: DERMATOLOGY

## 2021-10-11 PROCEDURE — 1159F MED LIST DOCD IN RCRD: CPT | Mod: CPTII,S$GLB,, | Performed by: DERMATOLOGY

## 2021-10-11 PROCEDURE — 3288F FALL RISK ASSESSMENT DOCD: CPT | Mod: CPTII,S$GLB,, | Performed by: DERMATOLOGY

## 2021-10-11 PROCEDURE — 1101F PT FALLS ASSESS-DOCD LE1/YR: CPT | Mod: CPTII,S$GLB,, | Performed by: DERMATOLOGY

## 2021-10-11 PROCEDURE — 1160F RVW MEDS BY RX/DR IN RCRD: CPT | Mod: CPTII,S$GLB,, | Performed by: DERMATOLOGY

## 2021-10-15 LAB
FINAL PATHOLOGIC DIAGNOSIS: NORMAL
GROSS: NORMAL
Lab: NORMAL
MICROSCOPIC EXAM: NORMAL

## 2021-11-08 ENCOUNTER — PROCEDURE VISIT (OUTPATIENT)
Dept: DERMATOLOGY | Facility: CLINIC | Age: 71
End: 2021-11-08
Payer: COMMERCIAL

## 2021-11-08 VITALS
SYSTOLIC BLOOD PRESSURE: 158 MMHG | WEIGHT: 198 LBS | DIASTOLIC BLOOD PRESSURE: 90 MMHG | HEART RATE: 60 BPM | HEIGHT: 73 IN | BODY MASS INDEX: 26.24 KG/M2

## 2021-11-08 DIAGNOSIS — D22.39 ATYPICAL NEVUS OF NOSE: Primary | ICD-10-CM

## 2021-11-08 PROCEDURE — 88342 IMHCHEM/IMCYTCHM 1ST ANTB: CPT | Mod: 26,,, | Performed by: PATHOLOGY

## 2021-11-08 PROCEDURE — 88342 IMHCHEM/IMCYTCHM 1ST ANTB: CPT | Performed by: PATHOLOGY

## 2021-11-08 PROCEDURE — 88341 IMHCHEM/IMCYTCHM EA ADD ANTB: CPT | Performed by: PATHOLOGY

## 2021-11-08 PROCEDURE — 99499 NO LOS: ICD-10-PCS | Mod: S$GLB,,, | Performed by: DERMATOLOGY

## 2021-11-08 PROCEDURE — 99499 UNLISTED E&M SERVICE: CPT | Mod: S$GLB,,, | Performed by: DERMATOLOGY

## 2021-11-08 PROCEDURE — 88341 PR IHC OR ICC EACH ADD'L SINGLE ANTIBODY  STAINPR: ICD-10-PCS | Mod: 26,,, | Performed by: PATHOLOGY

## 2021-11-08 PROCEDURE — 88305 TISSUE EXAM BY PATHOLOGIST: ICD-10-PCS | Mod: 26,,, | Performed by: PATHOLOGY

## 2021-11-08 PROCEDURE — 11442 PR EXC SKIN BENIG 1.1-2 CM FACE,FACIAL: ICD-10-PCS | Mod: S$GLB,,, | Performed by: DERMATOLOGY

## 2021-11-08 PROCEDURE — 11442 EXC FACE-MM B9+MARG 1.1-2 CM: CPT | Mod: S$GLB,,, | Performed by: DERMATOLOGY

## 2021-11-08 PROCEDURE — 88305 TISSUE EXAM BY PATHOLOGIST: CPT | Performed by: PATHOLOGY

## 2021-11-08 PROCEDURE — 88341 IMHCHEM/IMCYTCHM EA ADD ANTB: CPT | Mod: 26,,, | Performed by: PATHOLOGY

## 2021-11-08 PROCEDURE — 88305 TISSUE EXAM BY PATHOLOGIST: CPT | Mod: 26,,, | Performed by: PATHOLOGY

## 2021-11-08 PROCEDURE — 88342 CHG IMMUNOCYTOCHEMISTRY: ICD-10-PCS | Mod: 26,,, | Performed by: PATHOLOGY

## 2021-11-11 ENCOUNTER — ANESTHESIA EVENT (OUTPATIENT)
Dept: ENDOSCOPY | Facility: HOSPITAL | Age: 71
End: 2021-11-11
Payer: COMMERCIAL

## 2021-11-12 ENCOUNTER — ANESTHESIA (OUTPATIENT)
Dept: ENDOSCOPY | Facility: HOSPITAL | Age: 71
End: 2021-11-12
Payer: COMMERCIAL

## 2021-11-12 ENCOUNTER — HOSPITAL ENCOUNTER (OUTPATIENT)
Facility: HOSPITAL | Age: 71
Discharge: HOME | End: 2021-11-12
Attending: INTERNAL MEDICINE | Admitting: INTERNAL MEDICINE
Payer: COMMERCIAL

## 2021-11-12 VITALS
HEIGHT: 73 IN | TEMPERATURE: 98 F | BODY MASS INDEX: 25.18 KG/M2 | HEART RATE: 68 BPM | SYSTOLIC BLOOD PRESSURE: 156 MMHG | RESPIRATION RATE: 16 BRPM | DIASTOLIC BLOOD PRESSURE: 99 MMHG | WEIGHT: 190 LBS | OXYGEN SATURATION: 100 %

## 2021-11-12 DIAGNOSIS — Z12.11 SPECIAL SCREENING FOR MALIGNANT NEOPLASMS, COLON: Primary | ICD-10-CM

## 2021-11-12 DIAGNOSIS — Z12.11 COLON CANCER SCREENING: ICD-10-CM

## 2021-11-12 LAB
FINAL PATHOLOGIC DIAGNOSIS: NORMAL
GROSS: NORMAL
Lab: NORMAL
MICROSCOPIC EXAM: NORMAL

## 2021-11-12 PROCEDURE — E9220 PRA ENDO ANESTHESIA: ICD-10-PCS | Mod: ,,, | Performed by: NURSE ANESTHETIST, CERTIFIED REGISTERED

## 2021-11-12 PROCEDURE — G0121 COLON CA SCRN NOT HI RSK IND: ICD-10-PCS | Mod: ,,, | Performed by: INTERNAL MEDICINE

## 2021-11-12 PROCEDURE — G0121 COLON CA SCRN NOT HI RSK IND: HCPCS | Performed by: INTERNAL MEDICINE

## 2021-11-12 PROCEDURE — 63600175 PHARM REV CODE 636 W HCPCS: Performed by: NURSE ANESTHETIST, CERTIFIED REGISTERED

## 2021-11-12 PROCEDURE — 25000003 PHARM REV CODE 250: Performed by: NURSE ANESTHETIST, CERTIFIED REGISTERED

## 2021-11-12 PROCEDURE — E9220 PRA ENDO ANESTHESIA: HCPCS | Mod: ,,, | Performed by: NURSE ANESTHETIST, CERTIFIED REGISTERED

## 2021-11-12 PROCEDURE — G0121 COLON CA SCRN NOT HI RSK IND: HCPCS | Mod: ,,, | Performed by: INTERNAL MEDICINE

## 2021-11-12 PROCEDURE — 37000009 HC ANESTHESIA EA ADD 15 MINS: Performed by: INTERNAL MEDICINE

## 2021-11-12 PROCEDURE — 37000008 HC ANESTHESIA 1ST 15 MINUTES: Performed by: INTERNAL MEDICINE

## 2021-11-12 RX ORDER — PROPOFOL 10 MG/ML
VIAL (ML) INTRAVENOUS
Status: DISCONTINUED | OUTPATIENT
Start: 2021-11-12 | End: 2021-11-12

## 2021-11-12 RX ORDER — LIDOCAINE HYDROCHLORIDE 20 MG/ML
INJECTION INTRAVENOUS
Status: DISCONTINUED | OUTPATIENT
Start: 2021-11-12 | End: 2021-11-12

## 2021-11-12 RX ORDER — PROPOFOL 10 MG/ML
VIAL (ML) INTRAVENOUS CONTINUOUS PRN
Status: DISCONTINUED | OUTPATIENT
Start: 2021-11-12 | End: 2021-11-12

## 2021-11-12 RX ADMIN — SODIUM CHLORIDE: 0.9 INJECTION, SOLUTION INTRAVENOUS at 07:11

## 2021-11-12 RX ADMIN — PROPOFOL 30 MG: 10 INJECTION, EMULSION INTRAVENOUS at 07:11

## 2021-11-12 RX ADMIN — Medication 150 MCG/KG/MIN: at 07:11

## 2021-11-12 RX ADMIN — LIDOCAINE HYDROCHLORIDE 60 MG: 20 INJECTION, SOLUTION INTRAVENOUS at 07:11

## 2021-11-12 RX ADMIN — PROPOFOL 140 MG: 10 INJECTION, EMULSION INTRAVENOUS at 07:11

## 2021-11-14 ENCOUNTER — PATIENT MESSAGE (OUTPATIENT)
Dept: DERMATOLOGY | Facility: CLINIC | Age: 71
End: 2021-11-14
Payer: COMMERCIAL

## 2021-11-16 ENCOUNTER — PATIENT MESSAGE (OUTPATIENT)
Dept: DERMATOLOGY | Facility: CLINIC | Age: 71
End: 2021-11-16
Payer: COMMERCIAL

## 2021-11-30 ENCOUNTER — PROCEDURE VISIT (OUTPATIENT)
Dept: DERMATOLOGY | Facility: CLINIC | Age: 71
End: 2021-11-30
Payer: COMMERCIAL

## 2021-11-30 DIAGNOSIS — D22.39 ATYPICAL NEVUS OF NOSE: Primary | ICD-10-CM

## 2021-11-30 PROCEDURE — 99212 PR OFFICE/OUTPT VISIT, EST, LEVL II, 10-19 MIN: ICD-10-PCS | Mod: S$GLB,,, | Performed by: DERMATOLOGY

## 2021-11-30 PROCEDURE — 99212 OFFICE O/P EST SF 10 MIN: CPT | Mod: S$GLB,,, | Performed by: DERMATOLOGY

## 2021-12-20 ENCOUNTER — TELEPHONE (OUTPATIENT)
Dept: CARDIOLOGY | Facility: HOSPITAL | Age: 71
End: 2021-12-20
Payer: COMMERCIAL

## 2021-12-20 DIAGNOSIS — I10 PRIMARY HYPERTENSION: Primary | ICD-10-CM

## 2021-12-20 DIAGNOSIS — U07.1 COVID: Primary | ICD-10-CM

## 2021-12-20 RX ORDER — CHLORTHALIDONE 25 MG/1
25 TABLET ORAL DAILY
Qty: 30 TABLET | Refills: 5 | Status: SHIPPED | OUTPATIENT
Start: 2021-12-20 | End: 2022-01-18

## 2021-12-30 ENCOUNTER — LAB VISIT (OUTPATIENT)
Dept: LAB | Facility: HOSPITAL | Age: 71
End: 2021-12-30
Attending: INTERNAL MEDICINE
Payer: COMMERCIAL

## 2021-12-30 DIAGNOSIS — U07.1 COVID: ICD-10-CM

## 2021-12-30 DIAGNOSIS — I10 PRIMARY HYPERTENSION: ICD-10-CM

## 2021-12-30 LAB
ANION GAP SERPL CALC-SCNC: 9 MMOL/L (ref 8–16)
BUN SERPL-MCNC: 10 MG/DL (ref 8–23)
CALCIUM SERPL-MCNC: 9.2 MG/DL (ref 8.7–10.5)
CHLORIDE SERPL-SCNC: 87 MMOL/L (ref 95–110)
CO2 SERPL-SCNC: 32 MMOL/L (ref 23–29)
CREAT SERPL-MCNC: 0.9 MG/DL (ref 0.5–1.4)
EST. GFR  (AFRICAN AMERICAN): >60 ML/MIN/1.73 M^2
EST. GFR  (NON AFRICAN AMERICAN): >60 ML/MIN/1.73 M^2
GLUCOSE SERPL-MCNC: 88 MG/DL (ref 70–110)
POTASSIUM SERPL-SCNC: 3.2 MMOL/L (ref 3.5–5.1)
SARS-COV-2 IGG SERPL IA-ACNC: 4666 AU/ML
SARS-COV-2 IGG SERPL QL IA: POSITIVE
SODIUM SERPL-SCNC: 128 MMOL/L (ref 136–145)

## 2021-12-30 PROCEDURE — 86769 SARS-COV-2 COVID-19 ANTIBODY: CPT | Performed by: INTERNAL MEDICINE

## 2021-12-30 PROCEDURE — 36415 COLL VENOUS BLD VENIPUNCTURE: CPT | Performed by: INTERNAL MEDICINE

## 2021-12-30 PROCEDURE — 80048 BASIC METABOLIC PNL TOTAL CA: CPT | Performed by: INTERNAL MEDICINE

## 2021-12-31 DIAGNOSIS — E87.6 HYPOKALEMIA: ICD-10-CM

## 2022-01-11 ENCOUNTER — LAB VISIT (OUTPATIENT)
Dept: LAB | Facility: HOSPITAL | Age: 72
End: 2022-01-11
Attending: INTERNAL MEDICINE
Payer: COMMERCIAL

## 2022-01-11 DIAGNOSIS — E87.6 HYPOKALEMIA: ICD-10-CM

## 2022-01-11 LAB
ANION GAP SERPL CALC-SCNC: 8 MMOL/L (ref 8–16)
BUN SERPL-MCNC: 7 MG/DL (ref 8–23)
CALCIUM SERPL-MCNC: 9.8 MG/DL (ref 8.7–10.5)
CHLORIDE SERPL-SCNC: 94 MMOL/L (ref 95–110)
CO2 SERPL-SCNC: 30 MMOL/L (ref 23–29)
CREAT SERPL-MCNC: 0.8 MG/DL (ref 0.5–1.4)
EST. GFR  (AFRICAN AMERICAN): >60 ML/MIN/1.73 M^2
EST. GFR  (NON AFRICAN AMERICAN): >60 ML/MIN/1.73 M^2
GLUCOSE SERPL-MCNC: 89 MG/DL (ref 70–110)
POTASSIUM SERPL-SCNC: 3.9 MMOL/L (ref 3.5–5.1)
SODIUM SERPL-SCNC: 132 MMOL/L (ref 136–145)

## 2022-01-11 PROCEDURE — 36415 COLL VENOUS BLD VENIPUNCTURE: CPT | Performed by: INTERNAL MEDICINE

## 2022-01-11 PROCEDURE — 80048 BASIC METABOLIC PNL TOTAL CA: CPT | Performed by: INTERNAL MEDICINE

## 2022-01-18 DIAGNOSIS — I10 PRIMARY HYPERTENSION: ICD-10-CM

## 2022-01-18 DIAGNOSIS — E78.00 PURE HYPERCHOLESTEROLEMIA: ICD-10-CM

## 2022-01-18 DIAGNOSIS — I10 ESSENTIAL HYPERTENSION: ICD-10-CM

## 2022-01-18 RX ORDER — CHLORTHALIDONE 25 MG/1
12.5 TABLET ORAL DAILY
Qty: 45 TABLET | Refills: 3 | Status: SHIPPED | OUTPATIENT
Start: 2022-01-18 | End: 2022-06-27 | Stop reason: SINTOL

## 2022-01-19 RX ORDER — ATORVASTATIN CALCIUM 20 MG/1
TABLET, FILM COATED ORAL
Qty: 90 TABLET | Refills: 3 | Status: SHIPPED | OUTPATIENT
Start: 2022-01-19 | End: 2022-12-07 | Stop reason: SDUPTHER

## 2022-01-19 RX ORDER — METOPROLOL SUCCINATE 50 MG/1
50 TABLET, EXTENDED RELEASE ORAL DAILY
Qty: 90 TABLET | Refills: 3 | Status: SHIPPED | OUTPATIENT
Start: 2022-01-19 | End: 2022-12-07 | Stop reason: SDUPTHER

## 2022-01-28 ENCOUNTER — TELEPHONE (OUTPATIENT)
Dept: RESEARCH | Facility: HOSPITAL | Age: 72
End: 2022-01-28
Payer: COMMERCIAL

## 2022-01-28 NOTE — TELEPHONE ENCOUNTER
Study title: A PHASE 3 MASTER PROTOCOL TO EVALUATE ADDITIONAL DOSE(S) OF [id number] IN HEALTHY INDIVIDUALS PREVIOUSLY VACCINATED WITH [id number]  IRB #: 2021.146  Sponsor: Pfizer   Sponsor's Protocol: [id number]  Site Number: [id number]  Subject ID: [id number]    Reminder phone call for appointment on Monday, 31 Jan 2022 at 0900.    Subject will most likely need to reschedule. Appt left as a placeholder until confirmation is made

## 2022-01-31 ENCOUNTER — TELEPHONE (OUTPATIENT)
Dept: RESEARCH | Facility: HOSPITAL | Age: 72
End: 2022-01-31
Payer: COMMERCIAL

## 2022-01-31 NOTE — TELEPHONE ENCOUNTER
Study title: A PHASE 3 MASTER PROTOCOL TO EVALUATE ADDITIONAL DOSE(S) OF [id number] IN HEALTHY INDIVIDUALS PREVIOUSLY VACCINATED WITH [id number]  IRB #: 2021.146  Sponsor: Pfizer   Sponsor's Protocol: [id number]  Site Number: [id number]  Subject ID: [id number]    Subject's [id number] V3 rescheduled to Friday, Feb 4 at 1100.

## 2022-02-03 ENCOUNTER — TELEPHONE (OUTPATIENT)
Dept: RESEARCH | Facility: HOSPITAL | Age: 72
End: 2022-02-03
Payer: COMMERCIAL

## 2022-02-03 NOTE — TELEPHONE ENCOUNTER
Study title: A PHASE 3 MASTER PROTOCOL TO EVALUATE ADDITIONAL DOSE(S) OF [id number] IN HEALTHY INDIVIDUALS PREVIOUSLY VACCINATED WITH [id number]  IRB #: 2021.146  Sponsor: Pfizer   Sponsor's Protocol: [id number]  Site Number: [id number]  Subject ID: [id number]    Reminder phone call for appointment on Friday, 4 Feb 2022 at 1100.

## 2022-02-04 ENCOUNTER — RESEARCH ENCOUNTER (OUTPATIENT)
Dept: RESEARCH | Facility: CLINIC | Age: 72
End: 2022-02-04
Payer: COMMERCIAL

## 2022-02-04 NOTE — PROGRESS NOTES
Date consent signed: 2/4/2022    Consent     Study title: A PHASE 3 MASTER PROTOCOL TO EVALUATE ADDITIONAL DOSE(S) OF [id number] IN HEALTHY INDIVIDUALS PREVIOUSLY VACCINATED WITH [id number]  IRB #: 2021.146  Sponsor: Pfizer   Sponsor's Protocol: [id number]  Site Number: [id number]  Subject ID: [id number]    Informed Consent Process-Reconsent  Present for discussion: Jacques Rodriguez  Was the consent done electronically: no     Prior to the Informed Consent (IC) being signed, or any protocol required testing, procedure, or intervention being performed, the following was done or discussed:  · Purpose of the Study, Qualifications to Participate: yes  · Study Design, Schedule and Procedures: yes  · Risks, Benefits, Alternative Treatments, Compensation and Costs: yes  · Confidentiality and HIPAA Authorization for Release of Medical Records for the research trial/subject's right/study related injury: yes  · Study related contact information: yes  · Voluntary Participation and Withdrawal from the research trial at any time: yes  · Patient has been offered the opportunity to ask questions regarding the study and all questions were answered satisfactorily: yes  · CRC and PI contact information given to patient: yes  · Signed copy given to patient: yes  · Copy in patient's chart and original uploaded to Epic: yes    Patient able to adequately summarize: the purpose of the study, the risks associated with the study, and all procedures, testing, and follow-ups associated with the study: yes    Jacques Munoz MD signed the current IRB approved ICF. Each portion of the consent form was reviewed with him and all questions answered satisfactorily. He then signed the consent form, which was countersigned by the CRC on this day. A copy of the fully executed ICF was then provided to the subject via email. The original consent was electronically saved and uploaded to the [facility] EMR ([employer]) and filed appropriately.     Person  Obtaining Consent: Laura Balderas     Visit 3 (6- Month Follow-up)    Study title: A PHASE 3 MASTER PROTOCOL TO EVALUATE ADDITIONAL DOSE(S) OF [id number] IN HEALTHY INDIVIDUALS PREVIOUSLY VACCINATED WITH [id number]  IRB #: 2021.146  Sponsor: Pfizer   Sponsor's Protocol: [id number]  Site Number: [id number]  Subject ID: [id number]    Visit Assessments; 2/4/2022    The Subject presented for Visit 3 appointment as previously scheduled. Present during the visit are , Laura Balderas and participant, Jacques Munoz MD. Participant wishes to continue participation in the trial, understands participation is voluntary and can withdraw at any point during the trial.     Visit 3 procedures completed during this visit include the following:      Review of serious adverse events since the Subject's Previous Visit.    Review for changes in prohibited medications since Subject's Previous Visit.       All visit assessment procedures have been completed according to protocol.

## 2022-03-09 ENCOUNTER — TELEPHONE (OUTPATIENT)
Dept: RESEARCH | Facility: HOSPITAL | Age: 72
End: 2022-03-09
Payer: COMMERCIAL

## 2022-03-09 NOTE — TELEPHONE ENCOUNTER
Study title: A PHASE 3 MASTER PROTOCOL TO EVALUATE ADDITIONAL DOSE(S) OF [id number] IN HEALTHY INDIVIDUALS PREVIOUSLY VACCINATED WITH [id number]  IRB #: 2021.146  Sponsor: Pfizer   Sponsor's Protocol: [id number]  Site Number: [id number]  Subject ID: [id number]    Patient has not completed COVID-19 Illness Diary in [facility] for over one week.    Patient reminded to complete COVID-19 Illness Diary at least once a week, or anytime they experience symptoms.     Left voicemail.

## 2022-03-21 ENCOUNTER — LAB VISIT (OUTPATIENT)
Dept: INTERNAL MEDICINE | Facility: CLINIC | Age: 72
End: 2022-03-21
Payer: COMMERCIAL

## 2022-03-21 DIAGNOSIS — Z11.52 ENCOUNTER FOR SCREENING FOR COVID-19: Primary | ICD-10-CM

## 2022-03-21 LAB
CTP QC/QA: YES
SARS-COV-2 RDRP RESP QL NAA+PROBE: NEGATIVE

## 2022-03-21 PROCEDURE — U0002: ICD-10-PCS | Mod: QW,S$GLB,, | Performed by: INTERNAL MEDICINE

## 2022-03-21 PROCEDURE — U0002 COVID-19 LAB TEST NON-CDC: HCPCS | Mod: QW,S$GLB,, | Performed by: INTERNAL MEDICINE

## 2022-04-07 ENCOUNTER — PATIENT MESSAGE (OUTPATIENT)
Dept: HEMATOLOGY/ONCOLOGY | Facility: CLINIC | Age: 72
End: 2022-04-07
Payer: COMMERCIAL

## 2022-04-07 DIAGNOSIS — J01.10 ACUTE FRONTAL SINUSITIS, RECURRENCE NOT SPECIFIED: Primary | ICD-10-CM

## 2022-04-07 RX ORDER — LEVOFLOXACIN 750 MG/1
750 TABLET ORAL DAILY
Qty: 7 TABLET | Refills: 0 | Status: SHIPPED | OUTPATIENT
Start: 2022-04-07 | End: 2022-04-14

## 2022-05-12 ENCOUNTER — IMMUNIZATION (OUTPATIENT)
Dept: INTERNAL MEDICINE | Facility: CLINIC | Age: 72
End: 2022-05-12
Payer: COMMERCIAL

## 2022-05-12 DIAGNOSIS — Z23 NEED FOR VACCINATION: Primary | ICD-10-CM

## 2022-05-12 PROCEDURE — 91300 COVID-19, MRNA, LNP-S, PF, 30 MCG/0.3 ML DOSE VACCINE: CPT | Mod: PBBFAC | Performed by: INTERNAL MEDICINE

## 2022-05-21 ENCOUNTER — PATIENT MESSAGE (OUTPATIENT)
Dept: HEMATOLOGY/ONCOLOGY | Facility: CLINIC | Age: 72
End: 2022-05-21
Payer: COMMERCIAL

## 2022-05-23 DIAGNOSIS — K21.9 GASTROESOPHAGEAL REFLUX DISEASE WITHOUT ESOPHAGITIS: Primary | ICD-10-CM

## 2022-05-23 RX ORDER — OMEPRAZOLE 20 MG/1
20 CAPSULE, DELAYED RELEASE ORAL DAILY
Qty: 90 CAPSULE | Refills: 3 | Status: SHIPPED | OUTPATIENT
Start: 2022-05-23 | End: 2023-06-21 | Stop reason: SDUPTHER

## 2022-06-27 DIAGNOSIS — I10 PRIMARY HYPERTENSION: Primary | ICD-10-CM

## 2022-06-27 RX ORDER — VALSARTAN 80 MG/1
80 TABLET ORAL DAILY
Qty: 90 TABLET | Refills: 3 | Status: SHIPPED | OUTPATIENT
Start: 2022-06-27 | End: 2022-10-11 | Stop reason: DRUGHIGH

## 2022-08-04 ENCOUNTER — PATIENT MESSAGE (OUTPATIENT)
Dept: HEMATOLOGY/ONCOLOGY | Facility: CLINIC | Age: 72
End: 2022-08-04
Payer: COMMERCIAL

## 2022-08-04 DIAGNOSIS — E78.1 PURE HYPERTRIGLYCERIDEMIA: ICD-10-CM

## 2022-08-04 DIAGNOSIS — I10 PRIMARY HYPERTENSION: Primary | ICD-10-CM

## 2022-09-16 ENCOUNTER — IMMUNIZATION (OUTPATIENT)
Dept: PRIMARY CARE CLINIC | Facility: CLINIC | Age: 72
End: 2022-09-16
Payer: COMMERCIAL

## 2022-09-16 DIAGNOSIS — Z23 NEED FOR VACCINATION: Primary | ICD-10-CM

## 2022-09-16 PROCEDURE — 0124A COVID-19, MRNA, LNP-S, BIVALENT BOOSTER, PF, 30 MCG/0.3 ML DOSE: CPT | Mod: PBBFAC | Performed by: INTERNAL MEDICINE

## 2022-09-16 PROCEDURE — 91312 COVID-19, MRNA, LNP-S, BIVALENT BOOSTER, PF, 30 MCG/0.3 ML DOSE: CPT | Mod: PBBFAC | Performed by: INTERNAL MEDICINE

## 2022-10-07 ENCOUNTER — LAB VISIT (OUTPATIENT)
Dept: LAB | Facility: HOSPITAL | Age: 72
End: 2022-10-07
Attending: INTERNAL MEDICINE
Payer: COMMERCIAL

## 2022-10-07 DIAGNOSIS — I10 PRIMARY HYPERTENSION: ICD-10-CM

## 2022-10-07 DIAGNOSIS — E78.1 PURE HYPERTRIGLYCERIDEMIA: ICD-10-CM

## 2022-10-07 LAB
ALBUMIN SERPL BCP-MCNC: 4 G/DL (ref 3.5–5.2)
ALP SERPL-CCNC: 72 U/L (ref 55–135)
ALT SERPL W/O P-5'-P-CCNC: 21 U/L (ref 10–44)
ANION GAP SERPL CALC-SCNC: 9 MMOL/L (ref 8–16)
AST SERPL-CCNC: 25 U/L (ref 10–40)
BILIRUB SERPL-MCNC: 2.1 MG/DL (ref 0.1–1)
BUN SERPL-MCNC: 12 MG/DL (ref 8–23)
CALCIUM SERPL-MCNC: 9.3 MG/DL (ref 8.7–10.5)
CHLORIDE SERPL-SCNC: 105 MMOL/L (ref 95–110)
CHOLEST SERPL-MCNC: 205 MG/DL (ref 120–199)
CHOLEST/HDLC SERPL: 2.5 {RATIO} (ref 2–5)
CO2 SERPL-SCNC: 27 MMOL/L (ref 23–29)
COMPLEXED PSA SERPL-MCNC: 1.4 NG/ML (ref 0–4)
CREAT SERPL-MCNC: 0.9 MG/DL (ref 0.5–1.4)
ERYTHROCYTE [DISTWIDTH] IN BLOOD BY AUTOMATED COUNT: 12.2 % (ref 11.5–14.5)
EST. GFR  (NO RACE VARIABLE): >60 ML/MIN/1.73 M^2
GLUCOSE SERPL-MCNC: 93 MG/DL (ref 70–110)
HCT VFR BLD AUTO: 44.3 % (ref 40–54)
HDLC SERPL-MCNC: 83 MG/DL (ref 40–75)
HDLC SERPL: 40.5 % (ref 20–50)
HGB BLD-MCNC: 15.4 G/DL (ref 14–18)
IMM GRANULOCYTES # BLD AUTO: 0.01 K/UL (ref 0–0.04)
LDLC SERPL CALC-MCNC: 106 MG/DL (ref 63–159)
MCH RBC QN AUTO: 33.5 PG (ref 27–31)
MCHC RBC AUTO-ENTMCNC: 34.8 G/DL (ref 32–36)
MCV RBC AUTO: 96 FL (ref 82–98)
NEUTROPHILS # BLD AUTO: 3.2 K/UL (ref 1.8–7.7)
NONHDLC SERPL-MCNC: 122 MG/DL
PLATELET # BLD AUTO: 232 K/UL (ref 150–450)
PMV BLD AUTO: 9.6 FL (ref 9.2–12.9)
POTASSIUM SERPL-SCNC: 3.9 MMOL/L (ref 3.5–5.1)
PROT SERPL-MCNC: 6.5 G/DL (ref 6–8.4)
RBC # BLD AUTO: 4.6 M/UL (ref 4.6–6.2)
SODIUM SERPL-SCNC: 141 MMOL/L (ref 136–145)
TRIGL SERPL-MCNC: 80 MG/DL (ref 30–150)
TSH SERPL DL<=0.005 MIU/L-ACNC: 1.96 UIU/ML (ref 0.4–4)
WBC # BLD AUTO: 6.36 K/UL (ref 3.9–12.7)

## 2022-10-07 PROCEDURE — 80061 LIPID PANEL: CPT | Performed by: INTERNAL MEDICINE

## 2022-10-07 PROCEDURE — 36415 COLL VENOUS BLD VENIPUNCTURE: CPT | Performed by: INTERNAL MEDICINE

## 2022-10-07 PROCEDURE — 85027 COMPLETE CBC AUTOMATED: CPT | Performed by: INTERNAL MEDICINE

## 2022-10-07 PROCEDURE — 80053 COMPREHEN METABOLIC PANEL: CPT | Performed by: INTERNAL MEDICINE

## 2022-10-07 PROCEDURE — 84153 ASSAY OF PSA TOTAL: CPT | Performed by: INTERNAL MEDICINE

## 2022-10-07 PROCEDURE — 84443 ASSAY THYROID STIM HORMONE: CPT | Performed by: INTERNAL MEDICINE

## 2022-10-10 ENCOUNTER — OFFICE VISIT (OUTPATIENT)
Dept: HEMATOLOGY/ONCOLOGY | Facility: CLINIC | Age: 72
End: 2022-10-10
Payer: COMMERCIAL

## 2022-10-10 VITALS
HEART RATE: 69 BPM | SYSTOLIC BLOOD PRESSURE: 186 MMHG | OXYGEN SATURATION: 98 % | RESPIRATION RATE: 18 BRPM | BODY MASS INDEX: 26.85 KG/M2 | WEIGHT: 202.63 LBS | DIASTOLIC BLOOD PRESSURE: 104 MMHG | HEIGHT: 73 IN

## 2022-10-10 DIAGNOSIS — I10 PRIMARY HYPERTENSION: Primary | ICD-10-CM

## 2022-10-10 DIAGNOSIS — E78.00 PURE HYPERCHOLESTEROLEMIA: ICD-10-CM

## 2022-10-10 DIAGNOSIS — Z12.5 ENCOUNTER FOR PROSTATE CANCER SCREENING: ICD-10-CM

## 2022-10-10 PROCEDURE — 3080F PR MOST RECENT DIASTOLIC BLOOD PRESSURE >= 90 MM HG: ICD-10-PCS | Mod: CPTII,S$GLB,, | Performed by: INTERNAL MEDICINE

## 2022-10-10 PROCEDURE — 3008F BODY MASS INDEX DOCD: CPT | Mod: CPTII,S$GLB,, | Performed by: INTERNAL MEDICINE

## 2022-10-10 PROCEDURE — 3008F PR BODY MASS INDEX (BMI) DOCUMENTED: ICD-10-PCS | Mod: CPTII,S$GLB,, | Performed by: INTERNAL MEDICINE

## 2022-10-10 PROCEDURE — 3077F PR MOST RECENT SYSTOLIC BLOOD PRESSURE >= 140 MM HG: ICD-10-PCS | Mod: CPTII,S$GLB,, | Performed by: INTERNAL MEDICINE

## 2022-10-10 PROCEDURE — 3077F SYST BP >= 140 MM HG: CPT | Mod: CPTII,S$GLB,, | Performed by: INTERNAL MEDICINE

## 2022-10-10 PROCEDURE — 3080F DIAST BP >= 90 MM HG: CPT | Mod: CPTII,S$GLB,, | Performed by: INTERNAL MEDICINE

## 2022-10-10 PROCEDURE — 99999 PR PBB SHADOW E&M-EST. PATIENT-LVL III: CPT | Mod: PBBFAC,,, | Performed by: INTERNAL MEDICINE

## 2022-10-10 PROCEDURE — 99214 OFFICE O/P EST MOD 30 MIN: CPT | Mod: S$GLB,,, | Performed by: INTERNAL MEDICINE

## 2022-10-10 PROCEDURE — 1126F AMNT PAIN NOTED NONE PRSNT: CPT | Mod: CPTII,S$GLB,, | Performed by: INTERNAL MEDICINE

## 2022-10-10 PROCEDURE — 99214 PR OFFICE/OUTPT VISIT, EST, LEVL IV, 30-39 MIN: ICD-10-PCS | Mod: S$GLB,,, | Performed by: INTERNAL MEDICINE

## 2022-10-10 PROCEDURE — 1126F PR PAIN SEVERITY QUANTIFIED, NO PAIN PRESENT: ICD-10-PCS | Mod: CPTII,S$GLB,, | Performed by: INTERNAL MEDICINE

## 2022-10-10 PROCEDURE — 4010F PR ACE/ARB THEARPY RXD/TAKEN: ICD-10-PCS | Mod: CPTII,S$GLB,, | Performed by: INTERNAL MEDICINE

## 2022-10-10 PROCEDURE — 99999 PR PBB SHADOW E&M-EST. PATIENT-LVL III: ICD-10-PCS | Mod: PBBFAC,,, | Performed by: INTERNAL MEDICINE

## 2022-10-10 PROCEDURE — 4010F ACE/ARB THERAPY RXD/TAKEN: CPT | Mod: CPTII,S$GLB,, | Performed by: INTERNAL MEDICINE

## 2022-10-10 NOTE — PROGRESS NOTES
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    Chief Complaint: No chief complaint on file.    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    Overall, he has been doing well.  He had negative colonoscopy last year.  Is exercising on a regular basis.  He did had additional blood pressure medications started which has helped his blood pressure, generally running around 130/80 at home.  Review of Systems   Constitutional:  Negative for appetite change and unexpected weight change.   Eyes:  Negative for visual disturbance.   Respiratory:  Negative for cough and shortness of breath.    Cardiovascular:  Negative for chest pain.   Gastrointestinal:  Negative for abdominal pain and diarrhea.   Genitourinary:  Negative for frequency.   Musculoskeletal:  Negative for back pain.   Skin:  Negative for rash.   Neurological:  Negative for headaches.   Hematological:  Negative for adenopathy.   Psychiatric/Behavioral:  The patient is not nervous/anxious.      Objective:    Blood pressure 160/110 right arm sitting  Physical Exam  Vitals reviewed.   Constitutional:       Appearance: He is well-developed.   HENT:      Head: Normocephalic.      Mouth/Throat:      Pharynx: No oropharyngeal exudate.   Eyes:      General: No scleral icterus.  Neck:      Vascular: No JVD.   Cardiovascular:      Rate and Rhythm: Normal rate and regular rhythm.   Pulmonary:      Effort: Pulmonary effort is normal.      Breath sounds: Normal breath sounds.   Abdominal:      General: Bowel sounds are normal.      Palpations: Abdomen is soft.      Hernia: There is no hernia in the left inguinal area.   Lymphadenopathy:      Cervical: No cervical adenopathy.   Neurological:      Mental Status: He is alert and oriented to  person, place, and time.   Psychiatric:         Behavior: Behavior normal.         Thought Content: Thought content normal.       Assessment:     CBC normal, CMP Amor;i 2.1 - otherwise normal, Chol 205 with HDL 83, , TSH and PSA normal  1. Primary hypertension    2. Pure hypercholesterolemia        Plan:     Shingrix  Return to clinic in 1 year with labs.      Route Chart for Scheduling    Med Onc Chart Routing      Follow up with physician 1 year.   Follow up with [facility]    Infusion scheduling note    Injection scheduling note    Labs CBC, CMP, TSH, PSA and other   Lab interval:     Imaging    Pharmacy appointment    Other referrals

## 2022-10-11 ENCOUNTER — TELEPHONE (OUTPATIENT)
Dept: CARDIOLOGY | Facility: HOSPITAL | Age: 72
End: 2022-10-11
Payer: COMMERCIAL

## 2022-10-11 ENCOUNTER — PATIENT MESSAGE (OUTPATIENT)
Dept: CARDIOLOGY | Facility: CLINIC | Age: 72
End: 2022-10-11
Payer: COMMERCIAL

## 2022-10-11 ENCOUNTER — TELEPHONE (OUTPATIENT)
Dept: CARDIOLOGY | Facility: CLINIC | Age: 72
End: 2022-10-11
Payer: COMMERCIAL

## 2022-10-11 DIAGNOSIS — I10 PRIMARY HYPERTENSION: Primary | ICD-10-CM

## 2022-10-11 RX ORDER — VALSARTAN 160 MG/1
160 TABLET ORAL DAILY
Qty: 90 TABLET | Refills: 3 | Status: SHIPPED | OUTPATIENT
Start: 2022-10-11 | End: 2023-09-11 | Stop reason: SDUPTHER

## 2022-10-11 RX ORDER — HYDROCHLOROTHIAZIDE 12.5 MG/1
12.5 TABLET ORAL DAILY
Qty: 30 TABLET | Refills: 11 | Status: SHIPPED | OUTPATIENT
Start: 2022-10-11 | End: 2023-09-12 | Stop reason: SDUPTHER

## 2022-10-24 ENCOUNTER — HOSPITAL ENCOUNTER (OUTPATIENT)
Dept: CARDIOLOGY | Facility: HOSPITAL | Age: 72
Discharge: HOME | End: 2022-10-24
Attending: INTERNAL MEDICINE
Payer: COMMERCIAL

## 2022-10-24 DIAGNOSIS — I10 PRIMARY HYPERTENSION: ICD-10-CM

## 2022-10-24 LAB
ABDOMINAL AORTA MID EDV: 0 CM/S
ABDOMINAL AORTA MID PSV: 78 CM/S
LEFT RENAL DIST DIAS: 14 CM/S
LEFT RENAL DIST SYS: 49 CM/S
LEFT RENAL MID DIAS: 22 CM/S
LEFT RENAL MID RAR: 0.88
LEFT RENAL MID SYS: 69 CM/S
LEFT RENAL ORIGIN DIAS: 22 CM/S
LEFT RENAL ORIGIN SYS: 63 CM/S
LEFT RENAL PROX DIAS: 20 CM/S
LEFT RENAL PROX SYS: 65 CM/S
LEFT RENAL ULTRASOUND ACCELERATION TIME MEASUREMENT 1: 29 MS
LEFT RENAL ULTRASOUND ACCELERATION TIME MEASUREMENT 2: 29 MS
LEFT RENAL ULTRASOUND ACCELERATION TIME MEASUREMENT 3: 31 MS
LEFT RENAL ULTRASOUND ACCELERATION TIME MEASUREMENT AVERAGE: 31 MS
LEFT RENAL ULTRASOUND KIDNEY SIZE MEASUREMENT 1: 12.2 CM
LEFT RENAL ULTRASOUND KIDNEY SIZE MEASUREMENT 2: 12.2 CM
LEFT RENAL ULTRASOUND KIDNEY SIZE MEASUREMENT 3: 12.2 CM
LEFT RENAL ULTRASOUND KIDNEY SIZE MEASUREMENT AVERAGE: 12.2 CM
LEFT RENAL ULTRASOUND RESISTIVE INDEX MEASUREMENT 1: 0.64
LEFT RENAL ULTRASOUND RESISTIVE INDEX MEASUREMENT 2: 0.62
LEFT RENAL ULTRASOUND RESISTIVE INDEX MEASUREMENT 3: 0.67
LEFT RENAL ULTRASOUND RESISTIVE INDEX MEASUREMENT AVERAGE: 0.67
OHS CV LEFT RENAL RAR: 0.88
OHS CV RIGHT RENAL RAR: 0.99
OHS CV US LEFT RENAL HIGHEST EDV: 22
OHS CV US LEFT RENAL HIGHEST PSV: 69
OHS CV US RIGHT RENAL HIGHEST EDV: 24
OHS CV US RIGHT RENAL HIGHEST PSV: 77
RIGHT RENAL DIST DIAS: 9 CM/S
RIGHT RENAL DIST SYS: 31 CM/S
RIGHT RENAL MID DIAS: 24 CM/S
RIGHT RENAL MID RAR: 0.9
RIGHT RENAL MID SYS: 70 CM/S
RIGHT RENAL ORIGIN DIAS: 14 CM/S
RIGHT RENAL ORIGIN SYS: 63 CM/S
RIGHT RENAL PROX DIAS: 19 CM/S
RIGHT RENAL PROX SYS: 77 CM/S
RIGHT RENAL ULTRASOUND ACCELERATION TIME MEASUREMENT 1: 17 MS
RIGHT RENAL ULTRASOUND ACCELERATION TIME MEASUREMENT 2: 37 MS
RIGHT RENAL ULTRASOUND ACCELERATION TIME MEASUREMENT 3: 40 MS
RIGHT RENAL ULTRASOUND ACCELERATION TIME MEASUREMENT AVERAGE: 40 MS
RIGHT RENAL ULTRASOUND KIDNEY SIZE MEASUREMENT 1: 11.7 CM
RIGHT RENAL ULTRASOUND KIDNEY SIZE MEASUREMENT 2: 11.7 CM
RIGHT RENAL ULTRASOUND KIDNEY SIZE MEASUREMENT 3: 11.7 CM
RIGHT RENAL ULTRASOUND KIDNEY SIZE MEASUREMENT AVERAGE: 11.7 CM
RIGHT RENAL ULTRASOUND RESISTIVE INDEX MEASUREMENT 1: 0.67
RIGHT RENAL ULTRASOUND RESISTIVE INDEX MEASUREMENT 2: 0.64
RIGHT RENAL ULTRASOUND RESISTIVE INDEX MEASUREMENT 3: 0.61
RIGHT RENAL ULTRASOUND RESISTIVE INDEX MEASUREMENT AVERAGE: 0.67

## 2022-10-24 PROCEDURE — 93975 VASCULAR STUDY: CPT

## 2022-10-24 PROCEDURE — 93975 VASCULAR STUDY: CPT | Mod: 26,,, | Performed by: INTERNAL MEDICINE

## 2022-10-24 PROCEDURE — 93975 CV US RENAL ARTERY STENOSIS HYPERTENSION COMPLETE (CUPID ONLY): ICD-10-PCS | Mod: 26,,, | Performed by: INTERNAL MEDICINE

## 2022-11-30 ENCOUNTER — PATIENT MESSAGE (OUTPATIENT)
Dept: HEMATOLOGY/ONCOLOGY | Facility: CLINIC | Age: 72
End: 2022-11-30
Payer: COMMERCIAL

## 2022-11-30 DIAGNOSIS — J06.9 UPPER RESPIRATORY TRACT INFECTION, UNSPECIFIED TYPE: Primary | ICD-10-CM

## 2022-11-30 RX ORDER — LEVOFLOXACIN 500 MG/1
500 TABLET, FILM COATED ORAL DAILY
Qty: 14 TABLET | Refills: 0 | Status: SHIPPED | OUTPATIENT
Start: 2022-11-30 | End: 2022-12-14

## 2022-12-07 DIAGNOSIS — E78.00 PURE HYPERCHOLESTEROLEMIA: ICD-10-CM

## 2022-12-07 DIAGNOSIS — I10 ESSENTIAL HYPERTENSION: ICD-10-CM

## 2022-12-09 RX ORDER — ATORVASTATIN CALCIUM 20 MG/1
TABLET, FILM COATED ORAL
Qty: 90 TABLET | Refills: 3 | Status: SHIPPED | OUTPATIENT
Start: 2022-12-09 | End: 2023-12-11 | Stop reason: SDUPTHER

## 2022-12-09 RX ORDER — METOPROLOL SUCCINATE 50 MG/1
50 TABLET, EXTENDED RELEASE ORAL DAILY
Qty: 90 TABLET | Refills: 3 | Status: SHIPPED | OUTPATIENT
Start: 2022-12-09 | End: 2024-01-08 | Stop reason: SDUPTHER

## 2023-01-09 ENCOUNTER — CLINICAL SUPPORT (OUTPATIENT)
Dept: INTERNAL MEDICINE | Facility: CLINIC | Age: 73
End: 2023-01-09
Payer: COMMERCIAL

## 2023-01-09 DIAGNOSIS — Z23 NEED FOR SHINGLES VACCINE: Primary | ICD-10-CM

## 2023-01-09 PROCEDURE — 90750 ZOSTER RECOMBINANT VACCINE: ICD-10-PCS | Mod: S$GLB,,, | Performed by: INTERNAL MEDICINE

## 2023-01-09 PROCEDURE — 90471 IMMUNIZATION ADMIN: CPT | Mod: S$GLB,,, | Performed by: INTERNAL MEDICINE

## 2023-01-09 PROCEDURE — 90471 ZOSTER RECOMBINANT VACCINE: ICD-10-PCS | Mod: S$GLB,,, | Performed by: INTERNAL MEDICINE

## 2023-01-09 PROCEDURE — 90750 HZV VACC RECOMBINANT IM: CPT | Mod: S$GLB,,, | Performed by: INTERNAL MEDICINE

## 2023-03-06 ENCOUNTER — OFFICE VISIT (OUTPATIENT)
Dept: DERMATOLOGY | Facility: CLINIC | Age: 73
End: 2023-03-06
Payer: COMMERCIAL

## 2023-03-06 DIAGNOSIS — Z12.83 SCREENING EXAM FOR SKIN CANCER: ICD-10-CM

## 2023-03-06 DIAGNOSIS — L82.1 SK (SEBORRHEIC KERATOSIS): ICD-10-CM

## 2023-03-06 DIAGNOSIS — L91.8 SKIN TAG: ICD-10-CM

## 2023-03-06 DIAGNOSIS — L57.0 AK (ACTINIC KERATOSIS): Primary | ICD-10-CM

## 2023-03-06 PROCEDURE — 1101F PT FALLS ASSESS-DOCD LE1/YR: CPT | Mod: CPTII,S$GLB,, | Performed by: DERMATOLOGY

## 2023-03-06 PROCEDURE — 17000 DESTRUCT PREMALG LESION: CPT | Mod: S$GLB,,, | Performed by: DERMATOLOGY

## 2023-03-06 PROCEDURE — 1159F MED LIST DOCD IN RCRD: CPT | Mod: CPTII,S$GLB,, | Performed by: DERMATOLOGY

## 2023-03-06 PROCEDURE — 1160F RVW MEDS BY RX/DR IN RCRD: CPT | Mod: CPTII,S$GLB,, | Performed by: DERMATOLOGY

## 2023-03-06 PROCEDURE — 3288F PR FALLS RISK ASSESSMENT DOCUMENTED: ICD-10-PCS | Mod: CPTII,S$GLB,, | Performed by: DERMATOLOGY

## 2023-03-06 PROCEDURE — 1126F AMNT PAIN NOTED NONE PRSNT: CPT | Mod: CPTII,S$GLB,, | Performed by: DERMATOLOGY

## 2023-03-06 PROCEDURE — 99213 OFFICE O/P EST LOW 20 MIN: CPT | Mod: 25,S$GLB,, | Performed by: DERMATOLOGY

## 2023-03-06 PROCEDURE — 1101F PR PT FALLS ASSESS DOC 0-1 FALLS W/OUT INJ PAST YR: ICD-10-PCS | Mod: CPTII,S$GLB,, | Performed by: DERMATOLOGY

## 2023-03-06 PROCEDURE — 99999 PR PBB SHADOW E&M-EST. PATIENT-LVL III: CPT | Mod: PBBFAC,,, | Performed by: DERMATOLOGY

## 2023-03-06 PROCEDURE — 99999 PR PBB SHADOW E&M-EST. PATIENT-LVL III: ICD-10-PCS | Mod: PBBFAC,,, | Performed by: DERMATOLOGY

## 2023-03-06 PROCEDURE — 17000 PR DESTRUCTION(LASER SURGERY,CRYOSURGERY,CHEMOSURGERY),PREMALIGNANT LESIONS,FIRST LESION: ICD-10-PCS | Mod: S$GLB,,, | Performed by: DERMATOLOGY

## 2023-03-06 PROCEDURE — 99213 PR OFFICE/OUTPT VISIT, EST, LEVL III, 20-29 MIN: ICD-10-PCS | Mod: 25,S$GLB,, | Performed by: DERMATOLOGY

## 2023-03-06 PROCEDURE — 1126F PR PAIN SEVERITY QUANTIFIED, NO PAIN PRESENT: ICD-10-PCS | Mod: CPTII,S$GLB,, | Performed by: DERMATOLOGY

## 2023-03-06 PROCEDURE — 3288F FALL RISK ASSESSMENT DOCD: CPT | Mod: CPTII,S$GLB,, | Performed by: DERMATOLOGY

## 2023-03-06 PROCEDURE — 1160F PR REVIEW ALL MEDS BY PRESCRIBER/CLIN PHARMACIST DOCUMENTED: ICD-10-PCS | Mod: CPTII,S$GLB,, | Performed by: DERMATOLOGY

## 2023-03-06 PROCEDURE — 1159F PR MEDICATION LIST DOCUMENTED IN MEDICAL RECORD: ICD-10-PCS | Mod: CPTII,S$GLB,, | Performed by: DERMATOLOGY

## 2023-03-06 NOTE — PATIENT INSTRUCTIONS
CRYOSURGERY      Your doctor has used a method called cryosurgery to treat your skin condition. Cryosurgery refers to the use of very cold substances to treat a variety of skin conditions such as warts, pre-skin cancers, molluscum contagiosum, sun spots, and several benign growths. The substance we use in cryosurgery is liquid nitrogen and is so cold (-195 degrees Celsius) that is burns when administered.     Following treatment in the office, the skin may immediately burn and become red. You may find the area around the lesion is affected as well. It is sometimes necessary to treat not only the lesion, but a small area of the surrounding normal skin to achieve a good response.     A blister, and even a blood filled blister, may form after treatment.   This is a normal response. If the blister is painful, it is acceptable to sterilize a needle and with rubbing alcohol and gently pop the blister. It is important that you gently wash the area with soap and warm water as the blister fluid may contain wart virus if a wart was treated. Do no remove the roof of the blister.     The area treated can take anywhere from 1-3 weeks to heal. Healing time depends on the kind skin lesion treated, the location, and how aggressively the lesion was treated. It is recommended that the areas treated are covered with Vaseline or bacitracin ointment and a band-aid. If a band-aid is not practical, just ointment applied several times per day will do. Keeping these areas moist will speed the healing time.    Treatment with liquid nitrogen can leave a scar. In dark skin, it may be a light or dark scar, in light skin it may be a white or pink scar. These will generally fade with time, but may never go away completely.     If you have any concerns after your treatment, please feel free to call the office.       [address] [place] [address]/ [phone] FAX/ [email or web address]   Wound Care    A band aid and  vaseline ointment has been placed over the site.  This should remain in place for 24 hours.  It is recommended that you keep the area dry for the first 24 hours.  After 24 hours, you may remove the band aid and wash the area with warm soap and water and apply Vaseline jelly.  Many patients prefer to use Neosporin or Bacitracin ointment.  This is acceptable; however, know that you can develop an allergy to this medication even if you have used it safely for years.  It is important to keep the area moist.  Letting it dry out and get air slows healing time, and will worsen the scar.  Band aid is optional after first 24 hours.      If you notice increasing redness, tenderness, pain, or yellow drainage at the site, please notify your doctor.  These are signs of an infection.    If your site is bleeding, apply firm pressure for 15 minutes straight.  Repeat for another 15 minutes, if it is still bleeding.   If the surgical site continues to bleed, then please contact your doctor.      [address] [place] [address]/ [phone] FAX/ [email or web address]

## 2023-03-06 NOTE — PROGRESS NOTES
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    The patient was last seen on: 10/11/21 for skin check and bx to the right nasal ala - moderately atypical nevus excised by SSW on 11/8/21  This is a high risk patient here to check for the development of new lesions.    Other skin complaints:   Patient with new complaint of lesion(s)  Location: R nose  Duration: 3 months  Symptoms: none  Relieving factors/Previous treatments: none    Patient with new complaint of lesion(s)  Location: R eyelid   Duration: 3 months  Symptoms: none  Relieving factors/Previous treatments: none    Patient with new complaint of lesion(s)  Location: R upper arm  Duration: 1 year  Symptoms: none  Relieving factors/Previous treatments: none      Review of Systems   Skin:  Positive for activity-related sunscreen use and wears hat (activities). Negative for daily sunscreen use and recent sunburn.   Hematologic/Lymphatic: Does not bruise/bleed easily.      Objective:    Physical Exam   Constitutional: He appears well-developed and well-nourished. No distress.   Neurological: He is alert and oriented to person, place, and time. He is not disoriented.   Psychiatric: He has a normal mood and affect.   Skin:   Areas Examined (abnormalities noted in diagram):   Head / Face Inspection Performed  Neck Inspection Performed  Chest / Axilla Inspection Performed  Back Inspection Performed  RUE Inspected  LUE Inspection Performed  Nails and Digits Inspection Performed                 Diagram Legend     Erythematous scaling macule/papule c/w actinic keratosis       Vascular papule c/w angioma      Pigmented verrucoid papule/plaque c/w seborrheic keratosis      Yellow umbilicated papule c/w sebaceous hyperplasia       Irregularly shaped tan macule c/w lentigo     1-2 mm smooth white papules consistent with Milia      Movable subcutaneous cyst with punctum c/w epidermal inclusion cyst      Subcutaneous movable cyst c/w pilar cyst      Firm pink to brown papule c/w dermatofibroma      Pedunculated fleshy papule(s) c/w skin tag(s)      Evenly pigmented macule c/w junctional nevus     Mildly variegated pigmented, slightly irregular-bordered macule c/w mildly atypical nevus      Flesh colored to evenly pigmented papule c/w intradermal nevus       Pink pearly papule/plaque c/w basal cell carcinoma      Erythematous hyperkeratotic cursted plaque c/w SCC      Surgical scar with no sign of skin cancer recurrence      Open and closed comedones      Inflammatory papules and pustules      Verrucoid papule consistent consistent with wart     Erythematous eczematous patches and plaques     Dystrophic onycholytic nail with subungual debris c/w onychomycosis     Umbilicated papule    Erythematous-base heme-crusted tan verrucoid plaque consistent with inflamed seborrheic keratosis     Erythematous Silvery Scaling Plaque c/w Psoriasis     See annotation      Assessment / Plan:        AK (actinic keratosis)  Cryosurgery Procedure Note    Verbal consent from the patient is obtained including, but not limited to, risk of hypopigmentation/hyperpigmentation, scar, recurrence of lesion. The patient is aware of the precancerous quality and need for treatment of these lesions. Liquid nitrogen cryosurgery is applied to the 1 actinic keratoses, as detailed in the physical exam, to produce a freeze injury. The patient is aware that blisters may form and is instructed on wound care with gentle cleansing and use of vaseline ointment to keep moist until healed. The patient is supplied a handout on cryosurgery and is instructed to call if lesions do not completely resolve.      SK (seborrheic keratosis)  These are benign inherited growths without a malignant  potential. Reassurance given to patient. No treatment is necessary.       Screening exam for skin cancer  Upper body skin examination performed today including at least 6 points as noted in physical examination. No lesions suspicious for malignancy noted.    Recommend daily sun protection/avoidance and use of at least SPF 30, broad spectrum sunscreen (OTC drug).       Skin tag - right upper eyelid  Reassurance given to patient. No treatment is necessary.   Treatment of benign, asymptomatic lesions may be considered cosmetic.               No follow-ups on file.

## 2023-05-02 DIAGNOSIS — F51.01 PRIMARY INSOMNIA: Primary | ICD-10-CM

## 2023-05-02 RX ORDER — ZOLPIDEM TARTRATE 5 MG/1
5 TABLET ORAL NIGHTLY PRN
Qty: 30 TABLET | Refills: 3 | Status: SHIPPED | OUTPATIENT
Start: 2023-05-02 | End: 2023-11-21 | Stop reason: SDUPTHER

## 2023-06-08 ENCOUNTER — PATIENT MESSAGE (OUTPATIENT)
Dept: DERMATOLOGY | Facility: CLINIC | Age: 73
End: 2023-06-08
Payer: COMMERCIAL

## 2023-06-15 ENCOUNTER — OFFICE VISIT (OUTPATIENT)
Dept: DERMATOLOGY | Facility: CLINIC | Age: 73
End: 2023-06-15
Payer: COMMERCIAL

## 2023-06-15 DIAGNOSIS — D18.00 GLOMANGIOMA: ICD-10-CM

## 2023-06-15 DIAGNOSIS — L82.0 INFLAMED SEBORRHEIC KERATOSIS: Primary | ICD-10-CM

## 2023-06-15 PROCEDURE — 1126F AMNT PAIN NOTED NONE PRSNT: CPT | Mod: CPTII,S$GLB,, | Performed by: DERMATOLOGY

## 2023-06-15 PROCEDURE — 1101F PR PT FALLS ASSESS DOC 0-1 FALLS W/OUT INJ PAST YR: ICD-10-PCS | Mod: CPTII,S$GLB,, | Performed by: DERMATOLOGY

## 2023-06-15 PROCEDURE — 1159F MED LIST DOCD IN RCRD: CPT | Mod: CPTII,S$GLB,, | Performed by: DERMATOLOGY

## 2023-06-15 PROCEDURE — 1160F PR REVIEW ALL MEDS BY PRESCRIBER/CLIN PHARMACIST DOCUMENTED: ICD-10-PCS | Mod: CPTII,S$GLB,, | Performed by: DERMATOLOGY

## 2023-06-15 PROCEDURE — 1101F PT FALLS ASSESS-DOCD LE1/YR: CPT | Mod: CPTII,S$GLB,, | Performed by: DERMATOLOGY

## 2023-06-15 PROCEDURE — 17110 PR DESTRUCTION BENIGN LESIONS UP TO 14: ICD-10-PCS | Mod: S$GLB,,, | Performed by: DERMATOLOGY

## 2023-06-15 PROCEDURE — 99999 PR PBB SHADOW E&M-EST. PATIENT-LVL III: CPT | Mod: PBBFAC,,, | Performed by: DERMATOLOGY

## 2023-06-15 PROCEDURE — 4010F PR ACE/ARB THEARPY RXD/TAKEN: ICD-10-PCS | Mod: CPTII,S$GLB,, | Performed by: DERMATOLOGY

## 2023-06-15 PROCEDURE — 99212 OFFICE O/P EST SF 10 MIN: CPT | Mod: 25,S$GLB,, | Performed by: DERMATOLOGY

## 2023-06-15 PROCEDURE — 17110 DESTRUCTION B9 LES UP TO 14: CPT | Mod: S$GLB,,, | Performed by: DERMATOLOGY

## 2023-06-15 PROCEDURE — 3288F FALL RISK ASSESSMENT DOCD: CPT | Mod: CPTII,S$GLB,, | Performed by: DERMATOLOGY

## 2023-06-15 PROCEDURE — 99212 PR OFFICE/OUTPT VISIT, EST, LEVL II, 10-19 MIN: ICD-10-PCS | Mod: 25,S$GLB,, | Performed by: DERMATOLOGY

## 2023-06-15 PROCEDURE — 4010F ACE/ARB THERAPY RXD/TAKEN: CPT | Mod: CPTII,S$GLB,, | Performed by: DERMATOLOGY

## 2023-06-15 PROCEDURE — 1159F PR MEDICATION LIST DOCUMENTED IN MEDICAL RECORD: ICD-10-PCS | Mod: CPTII,S$GLB,, | Performed by: DERMATOLOGY

## 2023-06-15 PROCEDURE — 1126F PR PAIN SEVERITY QUANTIFIED, NO PAIN PRESENT: ICD-10-PCS | Mod: CPTII,S$GLB,, | Performed by: DERMATOLOGY

## 2023-06-15 PROCEDURE — 1160F RVW MEDS BY RX/DR IN RCRD: CPT | Mod: CPTII,S$GLB,, | Performed by: DERMATOLOGY

## 2023-06-15 PROCEDURE — 3288F PR FALLS RISK ASSESSMENT DOCUMENTED: ICD-10-PCS | Mod: CPTII,S$GLB,, | Performed by: DERMATOLOGY

## 2023-06-15 PROCEDURE — 99999 PR PBB SHADOW E&M-EST. PATIENT-LVL III: ICD-10-PCS | Mod: PBBFAC,,, | Performed by: DERMATOLOGY

## 2023-06-15 NOTE — PROGRESS NOTES
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   Chief Complaint   Patient presents with    Lesion     Back      Patient with new complaint of lesion(s)  Location: back  Duration: 1yr  Symptoms: itching, recurred  Relieving factors/Previous treatments: cryo    Patient with new complaint of lesion(s)  Location: forehead  Duration: 6mos  Symptoms: irritated per pt  Relieving factors/Previous treatments: none    Patient with new complaint of lesion(s)  Location: R scalp  Duration: 1yr  Symptoms: "SK"  Relieving factors/Previous treatments: none    Patient with new complaint of lesion(s)  Location: L upper arm  Duration: 2yrs  Symptoms: discomfort  Relieving factors/Previous treatments: none    Patient with new complaint of lesion(s)  Location: R elbow  Duration: 2yrs  Symptoms: "SK"  Relieving factors/Previous treatments: none                        Lesion    Review of Systems   Skin:  Positive for activity-related sunscreen use. Negative for daily sunscreen use.   Hematologic/Lymphatic: Does not bruise/bleed easily.     Objective:   Physical Exam   Constitutional: He appears well-developed and well-nourished. No distress.   Neurological: He is alert and oriented to person, place, and time. He is not disoriented.   Psychiatric: He has a normal mood and affect.   Skin:   Areas Examined (abnormalities noted in diagram):   Scalp / Hair Palpated and Inspected  Head / Face Inspection Performed  Neck Inspection Performed  Chest / Axilla Inspection Performed  Abdomen Inspection Performed  Back Inspection Performed  RUE Inspected  LUE Inspection Performed               Diagram Legend     Erythematous scaling macule/papule c/w actinic keratosis       Vascular papule c/w angioma      Pigmented verrucoid papule/plaque c/w seborrheic keratosis      Yellow umbilicated papule c/w sebaceous hyperplasia      Irregularly shaped tan macule c/w lentigo     1-2 mm smooth white papules " consistent with Milia      Movable subcutaneous cyst with punctum c/w epidermal inclusion cyst      Subcutaneous movable cyst c/w pilar cyst      Firm pink to brown papule c/w dermatofibroma      Pedunculated fleshy papule(s) c/w skin tag(s)      Evenly pigmented macule c/w junctional nevus     Mildly variegated pigmented, slightly irregular-bordered macule c/w mildly atypical nevus      Flesh colored to evenly pigmented papule c/w intradermal nevus       Pink pearly papule/plaque c/w basal cell carcinoma      Erythematous hyperkeratotic cursted plaque c/w SCC      Surgical scar with no sign of skin cancer recurrence      Open and closed comedones      Inflammatory papules and pustules      Verrucoid papule consistent consistent with wart     Erythematous eczematous patches and plaques     Dystrophic onycholytic nail with subungual debris c/w onychomycosis     Umbilicated papule    Erythematous-base heme-crusted tan verrucoid plaque consistent with inflamed seborrheic keratosis     Erythematous Silvery Scaling Plaque c/w Psoriasis     See annotation      Assessment / Plan:        Inflamed seborrheic keratosis  Cryosurgery procedure note:    Verbal consent from the patient is obtained including, but not limited to, risk of hypopigmentation/hyperpigmentation, scar, recurrence of lesion. Liquid nitrogen cryosurgery is applied to 1 lesions to produce a freeze injury. The patient is aware that blisters may form and is instructed on wound care with gentle cleansing and use of vaseline ointment to keep moist until healed. The patient is supplied a handout on cryosurgery and is instructed to call if lesions do not completely resolve.      Procedure note for destruction via shave debulking:    Discussed risks of procedure including but not limited to infection, persistence of lesion, recurrence of lesion, and scar. Verbal consent obtained. Area cleaned with alcohol and anesthetized with 1% lidocaine with epinephrine. 1  lesion(s) shaved with sharp razor then base destroyed with hyfrecation. No complications.    Glomangioma  Reassurance given to patient. No treatment is necessary.   Treatment of benign, asymptomatic lesions may be considered cosmetic.             Follow up if symptoms worsen or fail to improve.

## 2023-06-21 DIAGNOSIS — K21.9 GASTROESOPHAGEAL REFLUX DISEASE WITHOUT ESOPHAGITIS: ICD-10-CM

## 2023-06-22 RX ORDER — OMEPRAZOLE 20 MG/1
20 CAPSULE, DELAYED RELEASE ORAL DAILY
Qty: 90 CAPSULE | Refills: 3 | Status: SHIPPED | OUTPATIENT
Start: 2023-06-22 | End: 2024-06-21

## 2023-06-29 ENCOUNTER — TELEPHONE (OUTPATIENT)
Dept: AUDIOLOGY | Facility: CLINIC | Age: 73
End: 2023-06-29
Payer: COMMERCIAL

## 2023-07-25 ENCOUNTER — PATIENT MESSAGE (OUTPATIENT)
Dept: HEMATOLOGY/ONCOLOGY | Facility: CLINIC | Age: 73
End: 2023-07-25
Payer: COMMERCIAL

## 2023-07-25 DIAGNOSIS — I10 PRIMARY HYPERTENSION: Primary | ICD-10-CM

## 2023-07-26 ENCOUNTER — CLINICAL SUPPORT (OUTPATIENT)
Dept: AUDIOLOGY | Facility: CLINIC | Age: 73
End: 2023-07-26
Payer: COMMERCIAL

## 2023-07-26 DIAGNOSIS — H90.3 SENSORINEURAL HEARING LOSS (SNHL) OF BOTH EARS: Primary | ICD-10-CM

## 2023-07-26 PROCEDURE — 90750 HZV VACC RECOMBINANT IM: CPT | Mod: S$GLB,,, | Performed by: INTERNAL MEDICINE

## 2023-07-26 PROCEDURE — 99999 PR PBB SHADOW E&M-EST. PATIENT-LVL I: CPT | Mod: PBBFAC,,,

## 2023-07-26 PROCEDURE — 90471 ZOSTER RECOMBINANT VACCINE: ICD-10-PCS | Mod: S$GLB,,, | Performed by: INTERNAL MEDICINE

## 2023-07-26 PROCEDURE — 92557 COMPREHENSIVE HEARING TEST: CPT | Mod: S$GLB,,,

## 2023-07-26 PROCEDURE — 99499 UNLISTED E&M SERVICE: CPT | Mod: S$GLB,,,

## 2023-07-26 PROCEDURE — 90750 ZOSTER RECOMBINANT VACCINE: ICD-10-PCS | Mod: S$GLB,,, | Performed by: INTERNAL MEDICINE

## 2023-07-26 PROCEDURE — 92557 PR COMPREHENSIVE HEARING TEST: ICD-10-PCS | Mod: S$GLB,,,

## 2023-07-26 PROCEDURE — 99999 PR PBB SHADOW E&M-EST. PATIENT-LVL I: ICD-10-PCS | Mod: PBBFAC,,,

## 2023-07-26 PROCEDURE — 99499 NO LOS: ICD-10-PCS | Mod: S$GLB,,,

## 2023-07-26 PROCEDURE — 90471 IMMUNIZATION ADMIN: CPT | Mod: S$GLB,,, | Performed by: INTERNAL MEDICINE

## 2023-07-26 NOTE — PROGRESS NOTES
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 He wears [employer] [place] hearing aids in both ears, and has not noticed a significant change in hearing since his last audiogram. 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 He also reported longstanding tinnitus bilaterally.     Audiogram results revealed a mild to moderate high frequency sensorineural hearing loss bilaterally. Speech reception thresholds were noted at 25 dB in the right ear and 25 dB in the left ear.  Speech discrimination scores were 100% in the right ear and 100% in the left ear.    Recommendations:  Otologic evaluation  Follow-up with an audiologist for hearing aid adjustments as needed  Annual audiogram  Hearing protection when in noise

## 2023-07-26 NOTE — PROGRESS NOTES
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 An audiological evaluation was completed prior to this appointment, which showed a slight change in the hearing in his right ear. He mentioned that his right hearing aid has not been lasting longer than a couple hours a day. Hearing aids were checked and cleaned; receivers and domes were changed. They were also reprogrammed to today's audiogram, and firmware updates were completed. 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 It was agreed upon that he would try the hearing aids for a few days to determine if the battery was still not lasting long enough; if it isn't, he will bring the right hearing aid to [facility] to send off.     ReSound blas was discussed with Dr. Munoz, along with the purpose of the volume control button on the hearing aids. He expressed understanding, and will follow up as needed.     Hearing Aid Information     ReSound Quattro 9-R   Medium Blonde   Rt SN [id number]   Lt SN [id number]   : 3LP   Warranty Exp 3/19/23      SN [id number]

## 2023-07-27 ENCOUNTER — CLINICAL SUPPORT (OUTPATIENT)
Dept: INTERNAL MEDICINE | Facility: CLINIC | Age: 73
End: 2023-07-27
Payer: COMMERCIAL

## 2023-07-27 DIAGNOSIS — Z23 NEED FOR SHINGLES VACCINE: Primary | ICD-10-CM

## 2023-07-31 ENCOUNTER — DOCUMENTATION ONLY (OUTPATIENT)
Dept: AUDIOLOGY | Facility: CLINIC | Age: 73
End: 2023-07-31
Payer: COMMERCIAL

## 2023-07-31 NOTE — PROGRESS NOTES
Patient dropped off his hearing aids and  at the clinic.  Right hearing aid only will be sent in to [employer] for repair due to high battery drainage.  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    Hearing Aid Information      ReSound Quattro 9-R   Medium Blonde   Purchase Date: 2-  Rt  [id number]   : 3LP   Warranty Exp 3/19/23

## 2023-08-10 ENCOUNTER — DOCUMENTATION ONLY (OUTPATIENT)
Dept: AUDIOLOGY | Facility: CLINIC | Age: 73
End: 2023-08-10
Payer: COMMERCIAL

## 2023-08-10 ENCOUNTER — TELEPHONE (OUTPATIENT)
Dept: AUDIOLOGY | Facility: CLINIC | Age: 73
End: 2023-08-10
Payer: COMMERCIAL

## 2023-08-10 NOTE — PROGRESS NOTES
Received out of warranty repair from [employer]    Action Taken:  Replaced all componenets, housing and       Hearing Aid Information      ReSound Quattro 9-R   Medium Blonde   Purchase Date: 2-  Rt SN [id number]   : 3LP   Warranty Exp 8/7/2024

## 2023-08-21 ENCOUNTER — TELEPHONE (OUTPATIENT)
Dept: AUDIOLOGY | Facility: CLINIC | Age: 73
End: 2023-08-21
Payer: COMMERCIAL

## 2023-09-12 RX ORDER — HYDROCHLOROTHIAZIDE 12.5 MG/1
12.5 TABLET ORAL DAILY
Qty: 90 TABLET | Refills: 3 | Status: SHIPPED | OUTPATIENT
Start: 2023-09-12 | End: 2024-09-11

## 2023-09-12 RX ORDER — VALSARTAN 160 MG/1
160 TABLET ORAL DAILY
Qty: 90 TABLET | Refills: 3 | Status: SHIPPED | OUTPATIENT
Start: 2023-09-12 | End: 2024-09-11

## 2023-09-13 DIAGNOSIS — I10 PRIMARY HYPERTENSION: Primary | ICD-10-CM

## 2023-09-13 DIAGNOSIS — E78.00 PURE HYPERCHOLESTEROLEMIA: ICD-10-CM

## 2023-11-08 ENCOUNTER — LAB VISIT (OUTPATIENT)
Dept: LAB | Facility: HOSPITAL | Age: 73
End: 2023-11-08
Attending: INTERNAL MEDICINE
Payer: COMMERCIAL

## 2023-11-08 DIAGNOSIS — N40.1 BENIGN PROSTATIC HYPERPLASIA WITH NOCTURIA: ICD-10-CM

## 2023-11-08 DIAGNOSIS — E78.2 MIXED HYPERLIPIDEMIA: ICD-10-CM

## 2023-11-08 DIAGNOSIS — R35.1 BENIGN PROSTATIC HYPERPLASIA WITH NOCTURIA: ICD-10-CM

## 2023-11-08 DIAGNOSIS — E78.2 MIXED HYPERLIPIDEMIA: Primary | ICD-10-CM

## 2023-11-08 DIAGNOSIS — I10 PRIMARY HYPERTENSION: ICD-10-CM

## 2023-11-08 LAB
ALBUMIN SERPL BCP-MCNC: 3.8 G/DL (ref 3.5–5.2)
ALP SERPL-CCNC: 67 U/L (ref 55–135)
ALT SERPL W/O P-5'-P-CCNC: 21 U/L (ref 10–44)
ANION GAP SERPL CALC-SCNC: 6 MMOL/L (ref 8–16)
AST SERPL-CCNC: 23 U/L (ref 10–40)
BILIRUB SERPL-MCNC: 2.3 MG/DL (ref 0.1–1)
BUN SERPL-MCNC: 9 MG/DL (ref 8–23)
CALCIUM SERPL-MCNC: 9 MG/DL (ref 8.7–10.5)
CHLORIDE SERPL-SCNC: 104 MMOL/L (ref 95–110)
CHOLEST SERPL-MCNC: 187 MG/DL (ref 120–199)
CHOLEST/HDLC SERPL: 2.4 {RATIO} (ref 2–5)
CO2 SERPL-SCNC: 30 MMOL/L (ref 23–29)
COMPLEXED PSA SERPL-MCNC: 1.4 NG/ML (ref 0–4)
CREAT SERPL-MCNC: 1 MG/DL (ref 0.5–1.4)
ERYTHROCYTE [DISTWIDTH] IN BLOOD BY AUTOMATED COUNT: 12.1 % (ref 11.5–14.5)
EST. GFR  (NO RACE VARIABLE): >60 ML/MIN/1.73 M^2
FOLATE SERPL-MCNC: 6.5 NG/ML (ref 4–24)
GLUCOSE SERPL-MCNC: 106 MG/DL (ref 70–110)
HCT VFR BLD AUTO: 46.1 % (ref 40–54)
HDLC SERPL-MCNC: 77 MG/DL (ref 40–75)
HDLC SERPL: 41.2 % (ref 20–50)
HGB BLD-MCNC: 15.4 G/DL (ref 14–18)
IMM GRANULOCYTES # BLD AUTO: 0.02 K/UL (ref 0–0.04)
LDLC SERPL CALC-MCNC: 96 MG/DL (ref 63–159)
MCH RBC QN AUTO: 32.5 PG (ref 27–31)
MCHC RBC AUTO-ENTMCNC: 33.4 G/DL (ref 32–36)
MCV RBC AUTO: 97 FL (ref 82–98)
NEUTROPHILS # BLD AUTO: 2.3 K/UL (ref 1.8–7.7)
NONHDLC SERPL-MCNC: 110 MG/DL
PLATELET # BLD AUTO: 225 K/UL (ref 150–450)
PMV BLD AUTO: 9.3 FL (ref 9.2–12.9)
POTASSIUM SERPL-SCNC: 4.5 MMOL/L (ref 3.5–5.1)
PROT SERPL-MCNC: 6.4 G/DL (ref 6–8.4)
RBC # BLD AUTO: 4.74 M/UL (ref 4.6–6.2)
SODIUM SERPL-SCNC: 140 MMOL/L (ref 136–145)
TRIGL SERPL-MCNC: 70 MG/DL (ref 30–150)
TSH SERPL DL<=0.005 MIU/L-ACNC: 1.17 UIU/ML (ref 0.4–4)
VIT B12 SERPL-MCNC: 267 PG/ML (ref 210–950)
WBC # BLD AUTO: 5.31 K/UL (ref 3.9–12.7)

## 2023-11-08 PROCEDURE — 80053 COMPREHEN METABOLIC PANEL: CPT | Performed by: INTERNAL MEDICINE

## 2023-11-08 PROCEDURE — 84153 ASSAY OF PSA TOTAL: CPT | Performed by: INTERNAL MEDICINE

## 2023-11-08 PROCEDURE — 82607 VITAMIN B-12: CPT | Performed by: INTERNAL MEDICINE

## 2023-11-08 PROCEDURE — 80061 LIPID PANEL: CPT | Performed by: INTERNAL MEDICINE

## 2023-11-08 PROCEDURE — 85027 COMPLETE CBC AUTOMATED: CPT | Performed by: INTERNAL MEDICINE

## 2023-11-08 PROCEDURE — 84443 ASSAY THYROID STIM HORMONE: CPT | Performed by: INTERNAL MEDICINE

## 2023-11-08 PROCEDURE — 36415 COLL VENOUS BLD VENIPUNCTURE: CPT | Performed by: INTERNAL MEDICINE

## 2023-11-08 PROCEDURE — 82746 ASSAY OF FOLIC ACID SERUM: CPT | Performed by: INTERNAL MEDICINE

## 2023-11-13 ENCOUNTER — LAB VISIT (OUTPATIENT)
Dept: LAB | Facility: HOSPITAL | Age: 73
End: 2023-11-13
Attending: INTERNAL MEDICINE
Payer: COMMERCIAL

## 2023-11-13 ENCOUNTER — OFFICE VISIT (OUTPATIENT)
Dept: HEMATOLOGY/ONCOLOGY | Facility: CLINIC | Age: 73
End: 2023-11-13
Payer: COMMERCIAL

## 2023-11-13 VITALS
OXYGEN SATURATION: 98 % | HEIGHT: 73 IN | SYSTOLIC BLOOD PRESSURE: 153 MMHG | WEIGHT: 199.5 LBS | TEMPERATURE: 98 F | DIASTOLIC BLOOD PRESSURE: 92 MMHG | BODY MASS INDEX: 26.44 KG/M2 | HEART RATE: 72 BPM | RESPIRATION RATE: 18 BRPM

## 2023-11-13 DIAGNOSIS — R20.2 NUMBNESS AND TINGLING OF BOTH FEET: ICD-10-CM

## 2023-11-13 DIAGNOSIS — R20.0 NUMBNESS AND TINGLING OF BOTH FEET: ICD-10-CM

## 2023-11-13 DIAGNOSIS — Z12.5 ENCOUNTER FOR PROSTATE CANCER SCREENING: ICD-10-CM

## 2023-11-13 DIAGNOSIS — E78.00 PURE HYPERCHOLESTEROLEMIA: Primary | ICD-10-CM

## 2023-11-13 DIAGNOSIS — I10 PRIMARY HYPERTENSION: ICD-10-CM

## 2023-11-13 LAB
ESTIMATED AVG GLUCOSE: 111 MG/DL (ref 68–131)
HBA1C MFR BLD: 5.5 % (ref 4–5.6)

## 2023-11-13 PROCEDURE — 3008F BODY MASS INDEX DOCD: CPT | Mod: CPTII,S$GLB,, | Performed by: INTERNAL MEDICINE

## 2023-11-13 PROCEDURE — 1126F PR PAIN SEVERITY QUANTIFIED, NO PAIN PRESENT: ICD-10-PCS | Mod: CPTII,S$GLB,, | Performed by: INTERNAL MEDICINE

## 2023-11-13 PROCEDURE — 3080F PR MOST RECENT DIASTOLIC BLOOD PRESSURE >= 90 MM HG: ICD-10-PCS | Mod: CPTII,S$GLB,, | Performed by: INTERNAL MEDICINE

## 2023-11-13 PROCEDURE — 84165 PROTEIN E-PHORESIS SERUM: CPT | Mod: 26,,, | Performed by: PATHOLOGY

## 2023-11-13 PROCEDURE — 3288F PR FALLS RISK ASSESSMENT DOCUMENTED: ICD-10-PCS | Mod: CPTII,S$GLB,, | Performed by: INTERNAL MEDICINE

## 2023-11-13 PROCEDURE — 4010F ACE/ARB THERAPY RXD/TAKEN: CPT | Mod: CPTII,S$GLB,, | Performed by: INTERNAL MEDICINE

## 2023-11-13 PROCEDURE — 84165 PROTEIN E-PHORESIS SERUM: CPT | Performed by: INTERNAL MEDICINE

## 2023-11-13 PROCEDURE — 1101F PR PT FALLS ASSESS DOC 0-1 FALLS W/OUT INJ PAST YR: ICD-10-PCS | Mod: CPTII,S$GLB,, | Performed by: INTERNAL MEDICINE

## 2023-11-13 PROCEDURE — 99213 OFFICE O/P EST LOW 20 MIN: CPT | Mod: S$GLB,,, | Performed by: INTERNAL MEDICINE

## 2023-11-13 PROCEDURE — 3080F DIAST BP >= 90 MM HG: CPT | Mod: CPTII,S$GLB,, | Performed by: INTERNAL MEDICINE

## 2023-11-13 PROCEDURE — 4010F PR ACE/ARB THEARPY RXD/TAKEN: ICD-10-PCS | Mod: CPTII,S$GLB,, | Performed by: INTERNAL MEDICINE

## 2023-11-13 PROCEDURE — 1126F AMNT PAIN NOTED NONE PRSNT: CPT | Mod: CPTII,S$GLB,, | Performed by: INTERNAL MEDICINE

## 2023-11-13 PROCEDURE — 1159F PR MEDICATION LIST DOCUMENTED IN MEDICAL RECORD: ICD-10-PCS | Mod: CPTII,S$GLB,, | Performed by: INTERNAL MEDICINE

## 2023-11-13 PROCEDURE — 99999 PR PBB SHADOW E&M-EST. PATIENT-LVL IV: CPT | Mod: PBBFAC,,, | Performed by: INTERNAL MEDICINE

## 2023-11-13 PROCEDURE — 99213 PR OFFICE/OUTPT VISIT, EST, LEVL III, 20-29 MIN: ICD-10-PCS | Mod: S$GLB,,, | Performed by: INTERNAL MEDICINE

## 2023-11-13 PROCEDURE — 3077F PR MOST RECENT SYSTOLIC BLOOD PRESSURE >= 140 MM HG: ICD-10-PCS | Mod: CPTII,S$GLB,, | Performed by: INTERNAL MEDICINE

## 2023-11-13 PROCEDURE — 83921 ORGANIC ACID SINGLE QUANT: CPT | Performed by: INTERNAL MEDICINE

## 2023-11-13 PROCEDURE — 3288F FALL RISK ASSESSMENT DOCD: CPT | Mod: CPTII,S$GLB,, | Performed by: INTERNAL MEDICINE

## 2023-11-13 PROCEDURE — 84165 PATHOLOGIST INTERPRETATION SPE: ICD-10-PCS | Mod: 26,,, | Performed by: PATHOLOGY

## 2023-11-13 PROCEDURE — 83036 HEMOGLOBIN GLYCOSYLATED A1C: CPT | Performed by: INTERNAL MEDICINE

## 2023-11-13 PROCEDURE — 3077F SYST BP >= 140 MM HG: CPT | Mod: CPTII,S$GLB,, | Performed by: INTERNAL MEDICINE

## 2023-11-13 PROCEDURE — 3008F PR BODY MASS INDEX (BMI) DOCUMENTED: ICD-10-PCS | Mod: CPTII,S$GLB,, | Performed by: INTERNAL MEDICINE

## 2023-11-13 PROCEDURE — 1159F MED LIST DOCD IN RCRD: CPT | Mod: CPTII,S$GLB,, | Performed by: INTERNAL MEDICINE

## 2023-11-13 PROCEDURE — 1101F PT FALLS ASSESS-DOCD LE1/YR: CPT | Mod: CPTII,S$GLB,, | Performed by: INTERNAL MEDICINE

## 2023-11-13 PROCEDURE — 99999 PR PBB SHADOW E&M-EST. PATIENT-LVL IV: ICD-10-PCS | Mod: PBBFAC,,, | Performed by: INTERNAL MEDICINE

## 2023-11-13 NOTE — PROGRESS NOTES
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    Chief Complaint: No chief complaint on file.      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    Today he reports that he has been doing well.  His only complaint is some numbness in his feet.    He is active going to the gym and walking for exercise.  He also has a  twice a week.  There has been no change in his medications.  His blood pressure has been doing very well.      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  Last CT 2019.      Negative C-scope in 2021.    Review of Systems   Constitutional:  Negative for appetite change and unexpected weight change.   Eyes:  Negative for visual disturbance.   Respiratory:  Negative for cough and shortness of breath.    Cardiovascular:  Negative for chest pain.   Gastrointestinal:  Negative for abdominal pain and diarrhea.   Genitourinary:  Negative for frequency.   Musculoskeletal:  Negative for back pain.   Skin:  Negative for rash.   Neurological:  Negative for headaches.   Hematological:  Negative for adenopathy.   Psychiatric/Behavioral:  The patient is not nervous/anxious.        Objective:      Physical Exam  Vitals reviewed.   Constitutional:       Appearance: He is well-developed.   HENT:      Head: Normocephalic.      Mouth/Throat:      Pharynx: No oropharyngeal exudate.   Eyes:      General: No scleral icterus.  Neck:      Vascular: No JVD.   Cardiovascular:      Rate and Rhythm: Normal rate and regular rhythm.   Pulmonary:      Effort: Pulmonary effort is normal.      Breath sounds: Normal breath sounds.   Abdominal:      General: Bowel sounds are normal.      Palpations: Abdomen is soft.      Hernia: There is no hernia in the right inguinal area.   Genitourinary:     Testes: Normal.         Right: Mass not  present.         Left: Mass not present.      Epididymis:      Right: Normal.      Left: Normal.   Lymphadenopathy:      Cervical: No cervical adenopathy.   Neurological:      Mental Status: He is alert and oriented to person, place, and time.   Psychiatric:         Behavior: Behavior normal.         Thought Content: Thought content normal.       Assessment:     CBC normal, CMP Amor;i 2.3 - otherwise normal, Chol 187 with HDL 77, , TSH and PSA normal  1. Pure hypercholesterolemia    2. Primary hypertension        Plan:     Will check some labs to evaluate possible neuropathy issues-SPEP, methylmalonic acid, hemoglobin A1c.    He will need to follow-up with cardiology and I recommended he see   Return to clinic in 1 year with labs.      Route Chart for Scheduling    Med Onc Chart Routing      Follow up with physician 1 year.   Follow up with [facility]    Infusion scheduling note    Injection scheduling note    Labs CBC, CMP, PSA and TSH   Scheduling:  Preferred lab:  Lab interval:     Imaging    Pharmacy appointment    Other referrals

## 2023-11-14 LAB
ALBUMIN SERPL ELPH-MCNC: 4.05 G/DL (ref 3.35–5.55)
ALPHA1 GLOB SERPL ELPH-MCNC: 0.24 G/DL (ref 0.17–0.41)
ALPHA2 GLOB SERPL ELPH-MCNC: 0.55 G/DL (ref 0.43–0.99)
B-GLOBULIN SERPL ELPH-MCNC: 0.6 G/DL (ref 0.5–1.1)
GAMMA GLOB SERPL ELPH-MCNC: 0.76 G/DL (ref 0.67–1.58)
PATHOLOGIST INTERPRETATION SPE: NORMAL
PROT SERPL-MCNC: 6.2 G/DL (ref 6–8.4)

## 2023-11-16 LAB — METHYLMALONATE SERPL-SCNC: 0.33 UMOL/L

## 2023-11-21 DIAGNOSIS — F51.01 PRIMARY INSOMNIA: ICD-10-CM

## 2023-11-27 DIAGNOSIS — L30.9 DERMATITIS: Primary | ICD-10-CM

## 2023-11-27 RX ORDER — ZOLPIDEM TARTRATE 5 MG/1
5 TABLET ORAL NIGHTLY PRN
Qty: 30 TABLET | Refills: 3 | Status: SHIPPED | OUTPATIENT
Start: 2023-11-27 | End: 2024-05-27

## 2023-11-27 RX ORDER — TRIAMCINOLONE ACETONIDE 0.25 MG/G
CREAM TOPICAL
Qty: 30 G | Refills: 1 | Status: SHIPPED | OUTPATIENT
Start: 2023-11-27 | End: 2024-01-30

## 2023-12-11 DIAGNOSIS — E78.00 PURE HYPERCHOLESTEROLEMIA: ICD-10-CM

## 2023-12-12 RX ORDER — ATORVASTATIN CALCIUM 20 MG/1
TABLET, FILM COATED ORAL
Qty: 90 TABLET | Refills: 3 | Status: SHIPPED | OUTPATIENT
Start: 2023-12-12

## 2023-12-19 ENCOUNTER — TELEPHONE (OUTPATIENT)
Dept: INFECTIOUS DISEASES | Facility: CLINIC | Age: 73
End: 2023-12-19
Payer: COMMERCIAL

## 2023-12-19 ENCOUNTER — CLINICAL SUPPORT (OUTPATIENT)
Dept: INFECTIOUS DISEASES | Facility: CLINIC | Age: 73
End: 2023-12-19
Payer: COMMERCIAL

## 2023-12-19 DIAGNOSIS — Z00.00 HEALTHCARE MAINTENANCE: Primary | ICD-10-CM

## 2023-12-19 PROCEDURE — 91322 COVID-19 VAC, MRNA 2023 (MODERNA)(PF) 50 MCG/0.5 ML IM SUSR (12+): ICD-10-PCS | Mod: S$GLB,,, | Performed by: INTERNAL MEDICINE

## 2023-12-19 PROCEDURE — 99999 PR PBB SHADOW E&M-EST. PATIENT-LVL I: ICD-10-PCS | Mod: PBBFAC,,,

## 2023-12-19 PROCEDURE — 90480 ADMN SARSCOV2 VAC 1/ONLY CMP: CPT | Mod: S$GLB,,, | Performed by: INTERNAL MEDICINE

## 2023-12-19 PROCEDURE — 90480 COVID-19 VAC, MRNA 2023 (MODERNA)(PF) 50 MCG/0.5 ML IM SUSR (12+): ICD-10-PCS | Mod: S$GLB,,, | Performed by: INTERNAL MEDICINE

## 2023-12-19 PROCEDURE — 99999 PR PBB SHADOW E&M-EST. PATIENT-LVL I: CPT | Mod: PBBFAC,,,

## 2023-12-19 PROCEDURE — 91322 SARSCOV2 VAC 50 MCG/0.5ML IM: CPT | Mod: S$GLB,,, | Performed by: INTERNAL MEDICINE

## 2023-12-19 NOTE — PROGRESS NOTES
Patient received covid moderna spike vax IM to the left deltoid.  Tolerated well and left in NAD

## 2024-01-07 RX ORDER — PREDNISONE 10 MG/1
TABLET ORAL
Qty: 64 TABLET | Refills: 0 | OUTPATIENT
Start: 2024-01-07 | End: 2024-01-20

## 2024-01-08 ENCOUNTER — OFFICE VISIT (OUTPATIENT)
Dept: OTOLARYNGOLOGY | Facility: CLINIC | Age: 74
End: 2024-01-08
Payer: COMMERCIAL

## 2024-01-08 ENCOUNTER — CLINICAL SUPPORT (OUTPATIENT)
Dept: AUDIOLOGY | Facility: CLINIC | Age: 74
End: 2024-01-08
Payer: COMMERCIAL

## 2024-01-08 DIAGNOSIS — H91.21 SUDDEN RT HEARING LOSS: Primary | ICD-10-CM

## 2024-01-08 DIAGNOSIS — I10 ESSENTIAL HYPERTENSION: ICD-10-CM

## 2024-01-08 DIAGNOSIS — H90.3 SENSORINEURAL HEARING LOSS (SNHL) OF BOTH EARS: Primary | ICD-10-CM

## 2024-01-08 PROCEDURE — 99213 OFFICE O/P EST LOW 20 MIN: CPT | Mod: S$GLB,,, | Performed by: OTOLARYNGOLOGY

## 2024-01-08 PROCEDURE — 92557 COMPREHENSIVE HEARING TEST: CPT | Mod: S$GLB,,,

## 2024-01-08 PROCEDURE — 99999 PR PBB SHADOW E&M-EST. PATIENT-LVL III: CPT | Mod: PBBFAC,,, | Performed by: OTOLARYNGOLOGY

## 2024-01-08 PROCEDURE — 3288F FALL RISK ASSESSMENT DOCD: CPT | Mod: CPTII,S$GLB,, | Performed by: OTOLARYNGOLOGY

## 2024-01-08 PROCEDURE — 92567 TYMPANOMETRY: CPT | Mod: S$GLB,,,

## 2024-01-08 PROCEDURE — 99999 PR PBB SHADOW E&M-EST. PATIENT-LVL I: CPT | Mod: PBBFAC,,,

## 2024-01-08 PROCEDURE — 1159F MED LIST DOCD IN RCRD: CPT | Mod: CPTII,S$GLB,, | Performed by: OTOLARYNGOLOGY

## 2024-01-08 PROCEDURE — 1101F PT FALLS ASSESS-DOCD LE1/YR: CPT | Mod: CPTII,S$GLB,, | Performed by: OTOLARYNGOLOGY

## 2024-01-08 RX ORDER — METOPROLOL SUCCINATE 50 MG/1
50 TABLET, EXTENDED RELEASE ORAL DAILY
Qty: 90 TABLET | Refills: 3 | Status: SHIPPED | OUTPATIENT
Start: 2024-01-08

## 2024-01-08 NOTE — PROGRESS NOTES
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  The patient has a history of a bilateral sensorineural hearing loss (SNHL) and wears binaural Re[employer] hearing aids.  The patient reported he experienced a sudden hearing loss in his right ear over the weekend.  Dr. 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  He stated his hearing and tinnitus in the right ear has improved since starting prednisone.  There were no reports of dizziness.  Dr. Munoz reported a history of intermittent bilateral tinnitus prior to the sudden hearing loss.      Tympanometry revealed Type As/B in the right ear and Type A in the left ear.  Audiogram results revealed normal hearing sensitivity from 250-1500 Hz sloping to a moderate SNHL by 8000 Hz in the right ear and a mild hearing loss at 250 Hz rising to normal hearing sensitivity from 500-1500 Hz sloping to a moderate SNHL by 8000 Hz in the left ear.  Speech reception thresholds were noted at 20 dB in the right ear and 25 dB in the left ear.  Speech discrimination scores were 100% in the right ear and 100% in the left ear.    Recommendations:  Otologic evaluation  Hearing protection when in noise  Continue proper daily use of binaural amplification  Annual hearing aid follow-up or sooner if needed  Annual audiogram or sooner if change in hearing is perceived

## 2024-01-08 NOTE — PROGRESS NOTES
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    Chief Complaint: Hearing Loss    HPI: Had Sudden HL AD 2 days ago.    No URI, etc.    Started on high dose pred.    Much better now.    Past Medical History: Patient has a past medical history of Atypical nevus (10/11/2022), BPH (benign prostatic hyperplasia), Cataract, High cholesterol, Hypertension, and Kidney stone.    Past Surgical History: Patient has a past surgical history that includes Tonsillectomy; Knee arthroscopy w/ meniscectomy (Left, 11/22/2019); Arthroscopic chondroplasty of knee joint (Left, 11/22/2019); and Colonoscopy (N/A, 11/12/2021).    Social History: Patient reports that he has never smoked. He has never used smokeless tobacco. He reports current alcohol use of about 4.0 standard drinks of alcohol per week. He reports that he does not use drugs.    Family History: family history includes Hypertension in his father and mother.    Medications:   Current Outpatient Medications   Medication Sig    atorvastatin (LIPITOR) 20 MG tablet TAKE ONE TABLET BY MOUTH EVERY DAY    famotidine (PEPCID) 20 MG tablet Take 20 mg by mouth once daily.     fish oil-omega-3 fatty acids 300-1,000 mg capsule Take 2 g by mouth once daily.    fluticasone (FLONASE) 50 mcg/actuation nasal spray 1 spray by Each Nare route once daily.    hydroCHLOROthiazide (HYDRODIURIL) 12.5 MG Tab Take 1 tablet (12.5 mg total) by mouth once daily.    metoprolol succinate (TOPROL-XL) 50 MG 24 hr tablet Take 1 tablet (50 mg total) by mouth once daily.    omeprazole (PRILOSEC) 20 MG capsule Take 1 capsule (20 mg total) by mouth once daily.    predniSONE (DELTASONE) 10 MG tablet Take 6 tablets (60 mg total) by mouth once daily for 9 days, THEN 4 tablets (40 mg total) once daily for 1 day, THEN 3 tablets (30 mg total) once daily for 1 day, THEN 2 tablets (20 mg total) once daily for 1 day, THEN 1 tablet (10 mg total) once daily for 1 day.    triamcinolone acetonide  0.025% (KENALOG) 0.025 % cream apply to affected area daily to twice a day Not more than 1-2 weeks straight in same location    valsartan (DIOVAN) 160 MG tablet Take 1 tablet (160 mg total) by mouth once daily.    zolpidem (AMBIEN) 5 MG Tab Take 1 tablet (5 mg total) by mouth nightly as needed (insomnia).    loratadine 10 mg Cap Take by mouth.     Current Facility-Administered Medications   Medication    INV NSW602/placebo Injection 1 Dose    INV TZH910/placebo Injection 1 Dose    INV ZIW465r0/placebo Injection 1 Dose       Allergies: Patient has No Known Allergies.    Review of Systems   Constitutional: Negative.    HENT:  Positive for hearing loss and tinnitus. Negative for ear discharge and ear pain.    Neurological:  Negative for dizziness, seizures, syncope, facial asymmetry, speech difficulty, weakness, light-headedness and headaches.          Objective     Physical Exam  Constitutional:       General: He is not in acute distress.     Appearance: He is well-developed. He is not diaphoretic.   HENT:      Head: Normocephalic and atraumatic.      Right Ear: Decreased hearing noted. No laceration, drainage, swelling or tenderness. No middle ear effusion. No foreign body. No mastoid tenderness. No hemotympanum. Tympanic membrane is not injected, scarred, perforated, erythematous, retracted or bulging. Tympanic membrane has normal mobility.      Left Ear: Decreased hearing noted. No laceration, drainage, swelling or tenderness.  No middle ear effusion. No foreign body. No mastoid tenderness. No hemotympanum. Tympanic membrane is not injected, scarred, perforated, erythematous, retracted or bulging. Tympanic membrane has normal mobility.      Mouth/Throat:      Pharynx: No oropharyngeal exudate.   Eyes:      Extraocular Movements:      Right eye: Normal extraocular motion and no nystagmus.      Left eye: Normal extraocular motion and no nystagmus.      Pupils: Pupils are equal, round, and reactive to light.    Musculoskeletal:      Cervical back: Normal range of motion.   Neurological:      Mental Status: He is alert.      Cranial Nerves: No cranial nerve deficit.      Sensory: No sensory deficit.      Motor: No tremor, atrophy, abnormal muscle tone or seizure activity.      Coordination: Coordination normal.      Gait: Gait normal.            Assessment and Plan     1. Sudden rt hearing loss        Rec: Finish 10 day taper.    F/U prn         No follow-ups on file.

## 2024-01-26 NOTE — PROGRESS NOTES
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 He previously saw Dr. Damon in the past.  He has been feeling well.  He exercises daily and works with a  2 days a week.  He denies any chest pain, shortness of breath, palpitations or syncope.  He eats a healthy diet.  He checks his blood pressure regularly at home.  It runs typically between 120 and 130/70.  He reports he does have some white coat hypertension.  Had an episode a few weeks ago of sudden onset hearing loss and tinnitus in his right ear which recovered with steroid treatment.    His ECG done today and personally reviewed by me shows sinus bradycardia at 58 beats per minute.    Renal artery US 10/22:  Conclusion    There is insignificant stenosis (0-59%) in the Right Renal Artery.  Right kidney 11.70 cm.  There is insignificant stenosis (0-59%) in the Left Renal Artery.  Left kidney 12.20 cm.    Echo 6/15:  CONCLUSIONS     1 - Mildly enlarged ascending aorta.     2 - Normal left ventricular systolic function (EF 55-60%).     3 - Normal left ventricular diastolic function.     4 - Trivial mitral regurgitation.     5 - Normal right ventricular systolic function .     6 - Mild tricuspid regurgitation.     7 - Intermediate central venous pressure.       Past Medical History:   Diagnosis Date    Ascending aorta dilatation     Atypical nevus 10/11/2022    right nasal ala    BPH (benign prostatic hyperplasia)     Cataract     High cholesterol     Hypertension     Kidney stone        Past Surgical History:   Procedure Laterality Date    ARTHROSCOPIC CHONDROPLASTY OF KNEE JOINT Left 11/22/2019    Procedure: ARTHROSCOPY, KNEE, WITH CHONDROPLASTY;  Surgeon: Bairon Salas MD;  Location: [facility];  Service: Orthopedics;  Laterality:  Left;    COLONOSCOPY N/A 11/12/2021    Procedure: COLONOSCOPY;  Surgeon: Ishan Abreu MD;  Location: [facility] ENDO (4TH FLR);  Service: Endoscopy;  Laterality: N/A;  fully vaccinated-GT    pt is requesting Dr. Abreu or Dr. Rordigues-BB    KNEE ARTHROSCOPY W/ MENISCECTOMY Left 11/22/2019    Procedure: ARTHROSCOPY, KNEE, WITH MENISCECTOMY;  Surgeon: Bairon Salas MD;  Location: [facility] OR;  Service: Orthopedics;  Laterality: Left;  Partial medial and lateral    TONSILLECTOMY         Social History     Socioeconomic History    Marital status:    Occupational History    Occupation: physician   Tobacco Use    Smoking status: Never    Smokeless tobacco: Never   Substance and Sexual Activity    Alcohol use: Yes     Alcohol/week: 4.0 standard drinks of alcohol     Types: 4 Glasses of wine per week    Drug use: No    Sexual activity: Yes     Partners: Female     Social Determinants of Health     Financial Resource Strain: Low Risk  (12/19/2023)    Overall Financial Resource Strain (CARDIA)     Difficulty of Paying Living Expenses: Not hard at all   Food Insecurity: No Food Insecurity (12/19/2023)    Hunger Vital Sign     Worried About Running Out of Food in the Last Year: Never true     Ran Out of Food in the Last Year: Never true   Transportation Needs: No Transportation Needs (12/19/2023)    PRAPARE - Transportation     Lack of Transportation (Medical): No     Lack of Transportation (Non-Medical): No   Physical Activity: Sufficiently Active (12/19/2023)    Exercise Vital Sign     Days of Exercise per Week: 6 days     Minutes of Exercise per Session: 40 min   Stress: No Stress Concern Present (12/19/2023)    [employer] [employer] - Occupational Stress Questionnaire     Feeling of Stress : Not at all   Social Connections: Unknown (12/19/2023)    Social Connection and Isolation Panel [NHANES]     Frequency of Communication with Friends and Family: More than three times a week     Frequency of Social  Gatherings with Friends and Family: More than three times a week     Active Member of Clubs or Organizations: Yes     Attends Club or Organization Meetings: More than 4 times per year     Marital Status:    Housing Stability: Low Risk  (12/19/2023)    Housing Stability Vital Sign     Unable to Pay for Housing in the Last Year: No     Number of Places Lived in the Last Year: 1     Unstable Housing in the Last Year: No       Family History   Problem Relation Age of Onset    Hypertension Mother     Hypertension Father     Heart disease Other     Melanoma Neg Hx     Amblyopia Neg Hx     Blindness Neg Hx     Glaucoma Neg Hx     Macular degeneration Neg Hx     Retinal detachment Neg Hx     Strabismus Neg Hx     Cataracts Neg Hx        Patient's Medications   New Prescriptions    No medications on file   Previous Medications    ATORVASTATIN (LIPITOR) 20 MG TABLET    TAKE ONE TABLET BY MOUTH EVERY DAY    FAMOTIDINE (PEPCID) 20 MG TABLET    Take 20 mg by mouth once daily.     FISH OIL-OMEGA-3 FATTY ACIDS 300-1,000 MG CAPSULE    Take 2 g by mouth once daily.    FLUTICASONE (FLONASE) 50 MCG/ACTUATION NASAL SPRAY    1 spray by Each Nare route once daily.    HYDROCHLOROTHIAZIDE (HYDRODIURIL) 12.5 MG TAB    Take 1 tablet (12.5 mg total) by mouth once daily.    LORATADINE 10 MG CAP    Take by mouth.    METOPROLOL SUCCINATE (TOPROL-XL) 50 MG 24 HR TABLET    Take 1 tablet (50 mg total) by mouth once daily.    OMEPRAZOLE (PRILOSEC) 20 MG CAPSULE    Take 1 capsule (20 mg total) by mouth once daily.    TRIAMCINOLONE ACETONIDE 0.025% (KENALOG) 0.025 % CREAM    apply to affected area daily to twice a day Not more than 1-2 weeks straight in same location    VALSARTAN (DIOVAN) 160 MG TABLET    Take 1 tablet (160 mg total) by mouth once daily.    ZOLPIDEM (AMBIEN) 5 MG TAB    Take 1 tablet (5 mg total) by mouth nightly as needed (insomnia).   Modified Medications    No medications on file   Discontinued Medications    No medications  "on file       Review of Systems   Constitutional: Negative for malaise/fatigue and weight gain.   HENT:  Negative for hearing loss.    Eyes:  Negative for visual disturbance.   Cardiovascular:  Negative for chest pain, claudication, dyspnea on exertion, leg swelling, near-syncope, orthopnea, palpitations, paroxysmal nocturnal dyspnea and syncope.   Respiratory:  Negative for cough, shortness of breath, sleep disturbances due to breathing, snoring and wheezing.    Endocrine: Negative for cold intolerance, heat intolerance, polydipsia, polyphagia and polyuria.   Hematologic/Lymphatic: Negative for bleeding problem. Does not bruise/bleed easily.   Skin:  Negative for rash and suspicious lesions.   Musculoskeletal:  Negative for arthritis, falls, joint pain, muscle weakness and myalgias.   Gastrointestinal:  Negative for abdominal pain, change in bowel habit, constipation, diarrhea, heartburn, hematochezia, melena and nausea.   Genitourinary:  Negative for hematuria and nocturia.   Neurological:  Negative for excessive daytime sleepiness, dizziness, headaches, light-headedness, loss of balance and weakness.   Psychiatric/Behavioral:  Negative for depression. The patient is not nervous/anxious.    Allergic/Immunologic: Negative for environmental allergies.       BP (!) 142/85   Pulse 66   Ht 6' 1" (1.854 m)   Wt 91.6 kg (201 lb 15.1 oz)   SpO2 100%   BMI 26.64 kg/m²     Objective:   Physical Exam  Constitutional:       Appearance: He is well-developed.      Comments:      HENT:      Head: Normocephalic and atraumatic.   Eyes:      General: No scleral icterus.     Conjunctiva/sclera: Conjunctivae normal.      Pupils: Pupils are equal, round, and reactive to light.   Neck:      Thyroid: No thyromegaly.      Vascular: No hepatojugular reflux or JVD.      Trachea: No tracheal deviation.   Cardiovascular:      Rate and Rhythm: Normal rate and regular rhythm.      Chest Wall: PMI is not displaced.      Pulses: Intact " distal pulses.           Carotid pulses are 2+ on the right side and 2+ on the left side.       Radial pulses are 2+ on the right side and 2+ on the left side.        Dorsalis pedis pulses are 2+ on the right side and 2+ on the left side.        Posterior tibial pulses are 2+ on the right side and 2+ on the left side.      Heart sounds: Normal heart sounds.   Pulmonary:      Effort: Pulmonary effort is normal.      Breath sounds: Normal breath sounds.   Abdominal:      General: Bowel sounds are normal. There is no distension.      Palpations: Abdomen is soft. There is no mass.      Tenderness: There is no abdominal tenderness.   Musculoskeletal:         General: No tenderness.      Cervical back: Normal range of motion and neck supple.   Lymphadenopathy:      Cervical: No cervical adenopathy.   Skin:     General: Skin is warm and dry.      Findings: No erythema or rash.      Nails: There is no clubbing.   Neurological:      Mental Status: He is alert and oriented to person, place, and time.   Psychiatric:         Speech: Speech normal.         Behavior: Behavior normal.         Lab Results   Component Value Date     11/08/2023    K 4.5 11/08/2023     11/08/2023    CO2 30 (H) 11/08/2023    BUN 9 11/08/2023    CREATININE 1.0 11/08/2023     11/08/2023    HGBA1C 5.5 11/13/2023    AST 23 11/08/2023    ALT 21 11/08/2023    ALBUMIN 3.8 11/08/2023    PROT 6.4 11/08/2023    BILITOT 2.3 (H) 11/08/2023    WBC 5.31 11/08/2023    HGB 15.4 11/08/2023    HCT 46.1 11/08/2023    MCV 97 11/08/2023     11/08/2023    TSH 1.172 11/08/2023    CHOL 187 11/08/2023    HDL 77 (H) 11/08/2023    LDLCALC 96.0 11/08/2023    TRIG 70 11/08/2023       Assessment:     1. Agatston coronary artery calcium score less than 100 :  Continue atorvastatin.  LDL is at goal.   2. Ascending aorta dilatation :  This had measured 4.2 cm and had been stable between 2014 and 2019.  I have ordered a follow-up echocardiogram to confirm  stability.   3. Primary hypertension :  His blood pressure is at goal on home monitoring.   4. Pure hypercholesterolemia : Lipids are at goal. Continue statin therapy. Following a heart health diet such as the Mediterranean Diet is recommended in addition to 150 minutes a week of moderate intensity exercise to lower cholesterol, maintain a healthy body weight, and improve overall cardiovascular health.       Plan:     Jacques was seen today for establish care.    Diagnoses and all orders for this visit:    Agatston coronary artery calcium score less than 100  -     EKG 12-lead  -     Echo; Future    Ascending aorta dilatation  -     EKG 12-lead  -     Echo; Future    Primary hypertension  -     EKG 12-lead  -     Echo; Future    Pure hypercholesterolemia        Thank you for allowing me to participate in this patient's care. Please do not hesitate to contact me with any questions or concerns.

## 2024-01-30 ENCOUNTER — OFFICE VISIT (OUTPATIENT)
Dept: CARDIOLOGY | Facility: CLINIC | Age: 74
End: 2024-01-30
Payer: COMMERCIAL

## 2024-01-30 VITALS
OXYGEN SATURATION: 100 % | DIASTOLIC BLOOD PRESSURE: 85 MMHG | BODY MASS INDEX: 26.76 KG/M2 | HEIGHT: 73 IN | SYSTOLIC BLOOD PRESSURE: 142 MMHG | HEART RATE: 66 BPM | WEIGHT: 201.94 LBS

## 2024-01-30 DIAGNOSIS — E78.00 PURE HYPERCHOLESTEROLEMIA: ICD-10-CM

## 2024-01-30 DIAGNOSIS — I77.810 ASCENDING AORTA DILATATION: ICD-10-CM

## 2024-01-30 DIAGNOSIS — I10 PRIMARY HYPERTENSION: ICD-10-CM

## 2024-01-30 DIAGNOSIS — R93.1 AGATSTON CORONARY ARTERY CALCIUM SCORE LESS THAN 100: Primary | Chronic | ICD-10-CM

## 2024-01-30 PROCEDURE — 1101F PT FALLS ASSESS-DOCD LE1/YR: CPT | Mod: CPTII,S$GLB,, | Performed by: INTERNAL MEDICINE

## 2024-01-30 PROCEDURE — 99204 OFFICE O/P NEW MOD 45 MIN: CPT | Mod: S$GLB,,, | Performed by: INTERNAL MEDICINE

## 2024-01-30 PROCEDURE — 1159F MED LIST DOCD IN RCRD: CPT | Mod: CPTII,S$GLB,, | Performed by: INTERNAL MEDICINE

## 2024-01-30 PROCEDURE — 3079F DIAST BP 80-89 MM HG: CPT | Mod: CPTII,S$GLB,, | Performed by: INTERNAL MEDICINE

## 2024-01-30 PROCEDURE — 3077F SYST BP >= 140 MM HG: CPT | Mod: CPTII,S$GLB,, | Performed by: INTERNAL MEDICINE

## 2024-01-30 PROCEDURE — 93000 ELECTROCARDIOGRAM COMPLETE: CPT | Mod: S$GLB,,, | Performed by: INTERNAL MEDICINE

## 2024-01-30 PROCEDURE — 3288F FALL RISK ASSESSMENT DOCD: CPT | Mod: CPTII,S$GLB,, | Performed by: INTERNAL MEDICINE

## 2024-01-30 PROCEDURE — 1126F AMNT PAIN NOTED NONE PRSNT: CPT | Mod: CPTII,S$GLB,, | Performed by: INTERNAL MEDICINE

## 2024-01-30 PROCEDURE — 1160F RVW MEDS BY RX/DR IN RCRD: CPT | Mod: CPTII,S$GLB,, | Performed by: INTERNAL MEDICINE

## 2024-01-30 PROCEDURE — 99999 PR PBB SHADOW E&M-EST. PATIENT-LVL V: CPT | Mod: PBBFAC,,, | Performed by: INTERNAL MEDICINE

## 2024-03-08 ENCOUNTER — HOSPITAL ENCOUNTER (OUTPATIENT)
Dept: CARDIOLOGY | Facility: HOSPITAL | Age: 74
Discharge: HOME | End: 2024-03-08
Attending: INTERNAL MEDICINE
Payer: COMMERCIAL

## 2024-03-08 VITALS
WEIGHT: 201 LBS | BODY MASS INDEX: 26.64 KG/M2 | SYSTOLIC BLOOD PRESSURE: 126 MMHG | DIASTOLIC BLOOD PRESSURE: 71 MMHG | HEIGHT: 73 IN | HEART RATE: 70 BPM

## 2024-03-08 DIAGNOSIS — I10 PRIMARY HYPERTENSION: ICD-10-CM

## 2024-03-08 DIAGNOSIS — I77.810 ASCENDING AORTA DILATATION: ICD-10-CM

## 2024-03-08 DIAGNOSIS — R93.1 AGATSTON CORONARY ARTERY CALCIUM SCORE LESS THAN 100: Chronic | ICD-10-CM

## 2024-03-08 LAB
ASCENDING AORTA: 4.3 CM
AV INDEX (PROSTH): 0.96
AV MEAN GRADIENT: 3 MMHG
AV PEAK GRADIENT: 6 MMHG
AV VALVE AREA BY VELOCITY RATIO: 6.65 CM²
AV VALVE AREA: 6.35 CM²
AV VELOCITY RATIO: 1
BSA FOR ECHO PROCEDURE: 2.16 M2
CV ECHO LV RWT: 0.28 CM
DOP CALC AO PEAK VEL: 1.19 M/S
DOP CALC AO VTI: 27.11 CM
DOP CALC LVOT AREA: 6.6 CM2
DOP CALC LVOT DIAMETER: 2.91 CM
DOP CALC LVOT PEAK VEL: 1.19 M/S
DOP CALC LVOT STROKE VOLUME: 172.24 CM3
DOP CALCLVOT PEAK VEL VTI: 25.91 CM
E WAVE DECELERATION TIME: 238.35 MSEC
E/A RATIO: 0.9
E/E' RATIO: 7.86 M/S
ECHO LV POSTERIOR WALL: 0.81 CM (ref 0.6–1.1)
FRACTIONAL SHORTENING: 49 % (ref 28–44)
INTERVENTRICULAR SEPTUM: 1 CM (ref 0.6–1.1)
IVRT: 131.3 MSEC
LA MAJOR: 4.53 CM
LA MINOR: 4.77 CM
LA WIDTH: 3.26 CM
LEFT ATRIUM SIZE: 3.77 CM
LEFT ATRIUM VOLUME INDEX MOD: 18.7 ML/M2
LEFT ATRIUM VOLUME INDEX: 22.6 ML/M2
LEFT ATRIUM VOLUME MOD: 40.23 CM3
LEFT ATRIUM VOLUME: 48.54 CM3
LEFT INTERNAL DIMENSION IN SYSTOLE: 2.95 CM (ref 2.1–4)
LEFT VENTRICLE DIASTOLIC VOLUME INDEX: 76.66 ML/M2
LEFT VENTRICLE DIASTOLIC VOLUME: 164.82 ML
LEFT VENTRICLE MASS INDEX: 94 G/M2
LEFT VENTRICLE SYSTOLIC VOLUME INDEX: 15.7 ML/M2
LEFT VENTRICLE SYSTOLIC VOLUME: 33.7 ML
LEFT VENTRICULAR INTERNAL DIMENSION IN DIASTOLE: 5.77 CM (ref 3.5–6)
LEFT VENTRICULAR MASS: 203.13 G
LV LATERAL E/E' RATIO: 6.11 M/S
LV SEPTAL E/E' RATIO: 11 M/S
MV A" WAVE DURATION": 16.27 MSEC
MV PEAK A VEL: 0.61 M/S
MV PEAK E VEL: 0.55 M/S
MV STENOSIS PRESSURE HALF TIME: 69.12 MS
MV VALVE AREA P 1/2 METHOD: 3.18 CM2
PISA TR MAX VEL: 2.38 M/S
PULM VEIN S/D RATIO: 2.1
PV PEAK D VEL: 0.31 M/S
PV PEAK S VEL: 0.65 M/S
RA MAJOR: 4.67 CM
RA PRESSURE ESTIMATED: 3 MMHG
RA WIDTH: 3.45 CM
RIGHT VENTRICULAR END-DIASTOLIC DIMENSION: 3.64 CM
RV TB RVSP: 5 MMHG
SINUS: 4.17 CM
STJ: 3.98 CM
TDI LATERAL: 0.09 M/S
TDI SEPTAL: 0.05 M/S
TDI: 0.07 M/S
TR MAX PG: 23 MMHG
TRICUSPID ANNULAR PLANE SYSTOLIC EXCURSION: 1.81 CM
TV REST PULMONARY ARTERY PRESSURE: 26 MMHG
Z-SCORE OF LEFT VENTRICULAR DIMENSION IN END DIASTOLE: -1.9
Z-SCORE OF LEFT VENTRICULAR DIMENSION IN END SYSTOLE: -2.89

## 2024-03-08 PROCEDURE — 93306 TTE W/DOPPLER COMPLETE: CPT

## 2024-03-08 PROCEDURE — 93306 TTE W/DOPPLER COMPLETE: CPT | Mod: 26,,, | Performed by: INTERNAL MEDICINE

## 2024-05-09 ENCOUNTER — PATIENT MESSAGE (OUTPATIENT)
Dept: AUDIOLOGY | Facility: CLINIC | Age: 74
End: 2024-05-09
Payer: COMMERCIAL

## 2024-05-09 ENCOUNTER — DOCUMENTATION ONLY (OUTPATIENT)
Dept: AUDIOLOGY | Facility: CLINIC | Age: 74
End: 2024-05-09
Payer: COMMERCIAL

## 2024-05-09 NOTE — PROGRESS NOTES
Returned patient's call.  He stated his right hearing aid is weak.  I advised him to change the wax filter in his hearing aid.  He stated he did not know what that was and did not have any extra wax filters.  He was able to take the current wax filter out of the right aid, put the dome back on, and listen to the aid in order to trouble shoot.  He reported the aid sounded better after doing this.  I advised him to see if he has any extra wax filters in his hearing aid packaging and if he does, to place a new wax filter in the right hearing aid.  I told him if he does not find any wax filters, to come to the hearing aid window and have Priyanka put a wax filter in his  so wax does not get in the  and damage it.  I advised him to have Priyanka show him how to change the wax filters and to give him a few packs of wax filters.

## 2024-05-21 DIAGNOSIS — K21.9 GASTROESOPHAGEAL REFLUX DISEASE WITHOUT ESOPHAGITIS: ICD-10-CM

## 2024-05-21 RX ORDER — OMEPRAZOLE 20 MG/1
20 CAPSULE, DELAYED RELEASE ORAL DAILY
Qty: 90 CAPSULE | Refills: 3 | Status: SHIPPED | OUTPATIENT
Start: 2024-05-21 | End: 2025-05-21

## 2024-07-21 NOTE — PROGRESS NOTES
Subjective:       Patient ID:  Jacques Munoz is a 68 y.o. male who presents for   Chief Complaint   Patient presents with    Skin Check     UBSE     History of Present Illness: The patient presents for follow up of skin check.    The patient was last seen on: 9/15/16 for destruction of several isk's - all healed well  No h/o nmsc     Other skin complaints: several areas on face, neck that get irritated and pt picks at them          Review of Systems   Skin: Positive for activity-related sunscreen use. Negative for daily sunscreen use and recent sunburn.   Hematologic/Lymphatic: Does not bruise/bleed easily.        Objective:    Physical Exam   Constitutional: He appears well-developed and well-nourished. No distress.   Neurological: He is alert and oriented to person, place, and time. He is not disoriented.   Psychiatric: He has a normal mood and affect.   Skin:   Areas Examined (abnormalities noted in diagram):   Head / Face Inspection Performed  Neck Inspection Performed  Chest / Axilla Inspection Performed  Back Inspection Performed  RUE Inspected  LUE Inspection Performed                   Diagram Legend     Erythematous scaling macule/papule c/w actinic keratosis       Vascular papule c/w angioma      Pigmented verrucoid papule/plaque c/w seborrheic keratosis      Yellow umbilicated papule c/w sebaceous hyperplasia      Irregularly shaped tan macule c/w lentigo     1-2 mm smooth white papules consistent with Milia      Movable subcutaneous cyst with punctum c/w epidermal inclusion cyst      Subcutaneous movable cyst c/w pilar cyst      Firm pink to brown papule c/w dermatofibroma      Pedunculated fleshy papule(s) c/w skin tag(s)      Evenly pigmented macule c/w junctional nevus     Mildly variegated pigmented, slightly irregular-bordered macule c/w mildly atypical nevus      Flesh colored to evenly pigmented papule c/w intradermal nevus       Pink pearly papule/plaque c/w basal cell carcinoma       Erythematous hyperkeratotic cursted plaque c/w SCC      Surgical scar with no sign of skin cancer recurrence      Open and closed comedones      Inflammatory papules and pustules      Verrucoid papule consistent consistent with wart     Erythematous eczematous patches and plaques     Dystrophic onycholytic nail with subungual debris c/w onychomycosis     Umbilicated papule    Erythematous-base heme-crusted tan verrucoid plaque consistent with inflamed seborrheic keratosis     Erythematous Silvery Scaling Plaque c/w Psoriasis     See annotation      Assessment / Plan:        Inflamed seborrheic keratosis  Procedure note for destruction via hyfrecation and curettage:    Verbal consent obtained. Risks of recurrence and scarring discussed.   2 lesions cleaned with alcohol and anesthetized with 1% lidocaine with epinephrine. Areas then lightly hyfrecated and curetted to remove gross lesion. Hemostasis achieved with aluminum chloride application. No complications.   Areas dressed with Vaseline jelly and bandage. Wound care discussed.      Seborrheic keratosis  These are benign inherited growths without a malignant potential. Reassurance given to patient. No treatment is necessary.       Angiomas  This is a benign vascular lesion. Reassurance given. No treatment required.       Lentigo  This is a benign hyperpigmented sun induced lesion. Daily sun protection will reduce the number of new lesions. Treatment of these benign lesions are considered cosmetic.      Screening exam for skin cancer  Upper body skin examination performed today including at least 6 points as noted in physical examination. No lesions suspicious for malignancy noted.               Follow-up if symptoms worsen or fail to improve.   Bed/Stretcher in lowest position, wheels locked, appropriate side rails in place/Call bell, personal items and telephone in reach/Instruct patient to call for assistance before getting out of bed/chair/stretcher/Non-slip footwear applied when patient is off stretcher/[place] to call system/Physically safe environment - no spills, clutter or unnecessary equipment/Purposeful proactive rounding/Room/bathroom lighting operational, light cord in reach

## 2024-08-05 ENCOUNTER — TELEPHONE (OUTPATIENT)
Dept: INFECTIOUS DISEASES | Facility: CLINIC | Age: 74
End: 2024-08-05
Payer: COMMERCIAL

## 2024-08-05 DIAGNOSIS — Z00.00 HEALTHCARE MAINTENANCE: Primary | ICD-10-CM

## 2024-08-06 ENCOUNTER — CLINICAL SUPPORT (OUTPATIENT)
Dept: INFECTIOUS DISEASES | Facility: CLINIC | Age: 74
End: 2024-08-06
Payer: COMMERCIAL

## 2024-08-06 DIAGNOSIS — Z00.00 HEALTHCARE MAINTENANCE: Primary | ICD-10-CM

## 2024-08-06 PROCEDURE — 90480 ADMN SARSCOV2 VAC 1/ONLY CMP: CPT | Mod: S$GLB,,, | Performed by: INTERNAL MEDICINE

## 2024-08-06 PROCEDURE — 99999 PR PBB SHADOW E&M-EST. PATIENT-LVL I: CPT | Mod: PBBFAC,,,

## 2024-08-06 PROCEDURE — 91322 SARSCOV2 VAC 50 MCG/0.5ML IM: CPT | Mod: S$GLB,,, | Performed by: INTERNAL MEDICINE

## 2024-08-28 ENCOUNTER — TELEPHONE (OUTPATIENT)
Dept: CARDIOLOGY | Facility: CLINIC | Age: 74
End: 2024-08-28
Payer: COMMERCIAL

## 2024-08-28 RX ORDER — HYDROCHLOROTHIAZIDE 12.5 MG/1
12.5 TABLET ORAL DAILY
Qty: 90 TABLET | Refills: 4 | Status: SHIPPED | OUTPATIENT
Start: 2024-08-28 | End: 2025-08-28

## 2024-08-28 RX ORDER — HYDROCHLOROTHIAZIDE 12.5 MG/1
12.5 TABLET ORAL DAILY
Qty: 90 TABLET | Refills: 3 | Status: SHIPPED | OUTPATIENT
Start: 2024-08-28 | End: 2024-08-28

## 2024-08-29 ENCOUNTER — TELEPHONE (OUTPATIENT)
Dept: INFECTIOUS DISEASES | Facility: CLINIC | Age: 74
End: 2024-08-29
Payer: COMMERCIAL

## 2024-08-29 ENCOUNTER — CLINICAL SUPPORT (OUTPATIENT)
Dept: INFECTIOUS DISEASES | Facility: CLINIC | Age: 74
End: 2024-08-29
Payer: COMMERCIAL

## 2024-08-29 DIAGNOSIS — Z00.00 HEALTHCARE MAINTENANCE: Primary | ICD-10-CM

## 2024-08-29 PROCEDURE — 99999 PR PBB SHADOW E&M-EST. PATIENT-LVL I: CPT | Mod: PBBFAC,,,

## 2024-10-02 ENCOUNTER — PATIENT MESSAGE (OUTPATIENT)
Dept: CARDIOLOGY | Facility: CLINIC | Age: 74
End: 2024-10-02
Payer: COMMERCIAL

## 2024-10-02 RX ORDER — VALSARTAN 160 MG/1
160 TABLET ORAL DAILY
Qty: 90 TABLET | Refills: 3 | Status: SHIPPED | OUTPATIENT
Start: 2024-10-02 | End: 2025-10-02

## 2024-10-12 ENCOUNTER — PATIENT MESSAGE (OUTPATIENT)
Dept: HEMATOLOGY/ONCOLOGY | Facility: CLINIC | Age: 74
End: 2024-10-12
Payer: COMMERCIAL

## 2024-10-12 DIAGNOSIS — F51.01 PRIMARY INSOMNIA: ICD-10-CM

## 2024-10-14 RX ORDER — ZOLPIDEM TARTRATE 5 MG/1
5 TABLET ORAL NIGHTLY PRN
Qty: 30 TABLET | Refills: 3 | Status: SHIPPED | OUTPATIENT
Start: 2024-10-14 | End: 2025-04-14

## 2024-10-24 ENCOUNTER — TELEPHONE (OUTPATIENT)
Dept: DERMATOLOGY | Facility: CLINIC | Age: 74
End: 2024-10-24
Payer: COMMERCIAL

## 2024-11-05 ENCOUNTER — LAB VISIT (OUTPATIENT)
Dept: LAB | Facility: HOSPITAL | Age: 74
End: 2024-11-05
Attending: INTERNAL MEDICINE
Payer: COMMERCIAL

## 2024-11-05 ENCOUNTER — OFFICE VISIT (OUTPATIENT)
Dept: OPHTHALMOLOGY | Facility: CLINIC | Age: 74
End: 2024-11-05
Payer: COMMERCIAL

## 2024-11-05 DIAGNOSIS — E78.00 PURE HYPERCHOLESTEROLEMIA: ICD-10-CM

## 2024-11-05 DIAGNOSIS — Z12.5 ENCOUNTER FOR PROSTATE CANCER SCREENING: ICD-10-CM

## 2024-11-05 DIAGNOSIS — H53.10 SUBJECTIVE VISUAL DISTURBANCE OF BOTH EYES: ICD-10-CM

## 2024-11-05 DIAGNOSIS — H25.13 NUCLEAR SCLEROTIC CATARACT OF BOTH EYES: Primary | ICD-10-CM

## 2024-11-05 DIAGNOSIS — H43.21 ASTEROID HYALOSIS OF RIGHT EYE: ICD-10-CM

## 2024-11-05 DIAGNOSIS — H52.4 PRESBYOPIA: ICD-10-CM

## 2024-11-05 DIAGNOSIS — I10 PRIMARY HYPERTENSION: ICD-10-CM

## 2024-11-05 LAB
ALBUMIN SERPL BCP-MCNC: 3.7 G/DL (ref 3.5–5.2)
ALP SERPL-CCNC: 63 U/L (ref 40–150)
ALT SERPL W/O P-5'-P-CCNC: 19 U/L (ref 10–44)
ANION GAP SERPL CALC-SCNC: 8 MMOL/L (ref 8–16)
AST SERPL-CCNC: 22 U/L (ref 10–40)
BILIRUB SERPL-MCNC: 1.8 MG/DL (ref 0.1–1)
BUN SERPL-MCNC: 11 MG/DL (ref 8–23)
CALCIUM SERPL-MCNC: 8.9 MG/DL (ref 8.7–10.5)
CHLORIDE SERPL-SCNC: 105 MMOL/L (ref 95–110)
CHOLEST SERPL-MCNC: 175 MG/DL (ref 120–199)
CHOLEST/HDLC SERPL: 2.3 {RATIO} (ref 2–5)
CO2 SERPL-SCNC: 27 MMOL/L (ref 23–29)
COMPLEXED PSA SERPL-MCNC: 2.3 NG/ML (ref 0–4)
CREAT SERPL-MCNC: 0.9 MG/DL (ref 0.5–1.4)
ERYTHROCYTE [DISTWIDTH] IN BLOOD BY AUTOMATED COUNT: 12.2 % (ref 11.5–14.5)
EST. GFR  (NO RACE VARIABLE): >60 ML/MIN/1.73 M^2
GLUCOSE SERPL-MCNC: 107 MG/DL (ref 70–110)
HCT VFR BLD AUTO: 43.9 % (ref 40–54)
HDLC SERPL-MCNC: 76 MG/DL (ref 40–75)
HDLC SERPL: 43.4 % (ref 20–50)
HGB BLD-MCNC: 15 G/DL (ref 14–18)
IMM GRANULOCYTES # BLD AUTO: 0.01 K/UL (ref 0–0.04)
LDLC SERPL CALC-MCNC: 83.6 MG/DL (ref 63–159)
MCH RBC QN AUTO: 33.6 PG (ref 27–31)
MCHC RBC AUTO-ENTMCNC: 34.2 G/DL (ref 32–36)
MCV RBC AUTO: 98 FL (ref 82–98)
NEUTROPHILS # BLD AUTO: 2.1 K/UL (ref 1.8–7.7)
NONHDLC SERPL-MCNC: 99 MG/DL
PLATELET # BLD AUTO: 211 K/UL (ref 150–450)
PMV BLD AUTO: 9.4 FL (ref 9.2–12.9)
POTASSIUM SERPL-SCNC: 4.6 MMOL/L (ref 3.5–5.1)
PROT SERPL-MCNC: 6.1 G/DL (ref 6–8.4)
RBC # BLD AUTO: 4.46 M/UL (ref 4.6–6.2)
SODIUM SERPL-SCNC: 140 MMOL/L (ref 136–145)
TRIGL SERPL-MCNC: 77 MG/DL (ref 30–150)
TSH SERPL DL<=0.005 MIU/L-ACNC: 1.09 UIU/ML (ref 0.4–4)
WBC # BLD AUTO: 4.37 K/UL (ref 3.9–12.7)

## 2024-11-05 PROCEDURE — 80053 COMPREHEN METABOLIC PANEL: CPT | Performed by: INTERNAL MEDICINE

## 2024-11-05 PROCEDURE — 84443 ASSAY THYROID STIM HORMONE: CPT | Performed by: INTERNAL MEDICINE

## 2024-11-05 PROCEDURE — 80061 LIPID PANEL: CPT | Performed by: INTERNAL MEDICINE

## 2024-11-05 PROCEDURE — 84153 ASSAY OF PSA TOTAL: CPT | Performed by: INTERNAL MEDICINE

## 2024-11-05 PROCEDURE — 1160F RVW MEDS BY RX/DR IN RCRD: CPT | Mod: CPTII,S$GLB,, | Performed by: OPHTHALMOLOGY

## 2024-11-05 PROCEDURE — 3288F FALL RISK ASSESSMENT DOCD: CPT | Mod: CPTII,S$GLB,, | Performed by: OPHTHALMOLOGY

## 2024-11-05 PROCEDURE — 36415 COLL VENOUS BLD VENIPUNCTURE: CPT | Performed by: INTERNAL MEDICINE

## 2024-11-05 PROCEDURE — 99999 PR PBB SHADOW E&M-EST. PATIENT-LVL III: CPT | Mod: PBBFAC,,, | Performed by: OPHTHALMOLOGY

## 2024-11-05 PROCEDURE — 4010F ACE/ARB THERAPY RXD/TAKEN: CPT | Mod: CPTII,S$GLB,, | Performed by: OPHTHALMOLOGY

## 2024-11-05 PROCEDURE — 99214 OFFICE O/P EST MOD 30 MIN: CPT | Mod: S$GLB,,, | Performed by: OPHTHALMOLOGY

## 2024-11-05 PROCEDURE — 85027 COMPLETE CBC AUTOMATED: CPT | Performed by: INTERNAL MEDICINE

## 2024-11-05 PROCEDURE — 1126F AMNT PAIN NOTED NONE PRSNT: CPT | Mod: CPTII,S$GLB,, | Performed by: OPHTHALMOLOGY

## 2024-11-05 PROCEDURE — 1101F PT FALLS ASSESS-DOCD LE1/YR: CPT | Mod: CPTII,S$GLB,, | Performed by: OPHTHALMOLOGY

## 2024-11-05 PROCEDURE — 1159F MED LIST DOCD IN RCRD: CPT | Mod: CPTII,S$GLB,, | Performed by: OPHTHALMOLOGY

## 2024-11-05 PROCEDURE — G2211 COMPLEX E/M VISIT ADD ON: HCPCS | Mod: S$GLB,,, | Performed by: OPHTHALMOLOGY

## 2024-11-07 ENCOUNTER — OFFICE VISIT (OUTPATIENT)
Dept: SURGERY | Facility: CLINIC | Age: 74
End: 2024-11-07
Payer: COMMERCIAL

## 2024-11-07 VITALS
DIASTOLIC BLOOD PRESSURE: 81 MMHG | HEIGHT: 72 IN | HEART RATE: 58 BPM | SYSTOLIC BLOOD PRESSURE: 142 MMHG | BODY MASS INDEX: 26.88 KG/M2 | WEIGHT: 198.44 LBS

## 2024-11-07 DIAGNOSIS — K42.9 UMBILICAL HERNIA WITHOUT OBSTRUCTION AND WITHOUT GANGRENE: Primary | ICD-10-CM

## 2024-11-07 PROCEDURE — 3288F FALL RISK ASSESSMENT DOCD: CPT | Mod: CPTII,S$GLB,, | Performed by: SURGERY

## 2024-11-07 PROCEDURE — 1126F AMNT PAIN NOTED NONE PRSNT: CPT | Mod: CPTII,S$GLB,, | Performed by: SURGERY

## 2024-11-07 PROCEDURE — 4010F ACE/ARB THERAPY RXD/TAKEN: CPT | Mod: CPTII,S$GLB,, | Performed by: SURGERY

## 2024-11-07 PROCEDURE — 99999 PR PBB SHADOW E&M-EST. PATIENT-LVL V: CPT | Mod: PBBFAC,,, | Performed by: SURGERY

## 2024-11-07 PROCEDURE — 1159F MED LIST DOCD IN RCRD: CPT | Mod: CPTII,S$GLB,, | Performed by: SURGERY

## 2024-11-07 PROCEDURE — 1101F PT FALLS ASSESS-DOCD LE1/YR: CPT | Mod: CPTII,S$GLB,, | Performed by: SURGERY

## 2024-11-07 PROCEDURE — 3077F SYST BP >= 140 MM HG: CPT | Mod: CPTII,S$GLB,, | Performed by: SURGERY

## 2024-11-07 PROCEDURE — 99203 OFFICE O/P NEW LOW 30 MIN: CPT | Mod: S$GLB,,, | Performed by: SURGERY

## 2024-11-07 PROCEDURE — 3079F DIAST BP 80-89 MM HG: CPT | Mod: CPTII,S$GLB,, | Performed by: SURGERY

## 2024-11-07 PROCEDURE — 1160F RVW MEDS BY RX/DR IN RCRD: CPT | Mod: CPTII,S$GLB,, | Performed by: SURGERY

## 2024-11-07 NOTE — PROGRESS NOTES
History & Physical    SUBJECTIVE:     History of Present Illness:  Patient is a 74 y.o. male presents with umbilical hernia.  He has noticed it for 3 years and it is growing.  He has some discomfort after heavy working out.  He has gained 5-6 pounds in the last 3 years.  He denies constipation, difficulty urinating and chronic cough.  He does fairly intense exercise.    No chief complaint on file.      Review of patient's allergies indicates:   Allergen Reactions    Levofloxacin Rash       Current Outpatient Medications   Medication Sig Dispense Refill    atorvastatin (LIPITOR) 20 MG tablet TAKE ONE TABLET BY MOUTH EVERY DAY 90 tablet 3    famotidine (PEPCID) 20 MG tablet Take 20 mg by mouth once daily.       fish oil-omega-3 fatty acids 300-1,000 mg capsule Take 2 g by mouth once daily.      fluticasone (FLONASE) 50 mcg/actuation nasal spray 1 spray by Each Nare route once daily. 1 Bottle prn    hydroCHLOROthiazide (HYDRODIURIL) 12.5 MG Tab Take 1 tablet (12.5 mg total) by mouth once daily. 90 tablet 4    loratadine 10 mg Cap Take by mouth.      metoprolol succinate (TOPROL-XL) 50 MG 24 hr tablet Take 1 tablet (50 mg total) by mouth once daily. 90 tablet 3    omeprazole (PRILOSEC) 20 MG capsule Take 1 capsule (20 mg total) by mouth once daily. 90 capsule 3    valsartan (DIOVAN) 160 MG tablet Take 1 tablet (160 mg total) by mouth once daily. 90 tablet 3    zolpidem (AMBIEN) 5 MG Tab Take 1 tablet (5 mg total) by mouth nightly as needed (insomnia). 30 tablet 3     Current Facility-Administered Medications   Medication Dose Route Frequency Provider Last Rate Last Admin    INV [id number]/placebo Injection 1 Dose  1 Dose Intramuscular 1 time in Clinic/HOD Dede Falk, JOSE        INV [id number]/placebo Injection 1 Dose  1 Dose Intramuscular 1 time in Clinic/HOD Court Oliver MD        INV [id number]/placebo Injection 1 Dose  1 Dose Intramuscular 1 time in Clinic/HOD Court Oliver MD           Past Medical  History:   Diagnosis Date    Ascending aorta dilatation     Atypical nevus 10/11/2022    right nasal ala    BPH (benign prostatic hyperplasia)     Cataract     High cholesterol     Hypertension     Kidney stone      Past Surgical History:   Procedure Laterality Date    ARTHROSCOPIC CHONDROPLASTY OF KNEE JOINT Left 11/22/2019    Procedure: ARTHROSCOPY, KNEE, WITH CHONDROPLASTY;  Surgeon: Bairon Salas MD;  Location: [facility] OR;  Service: Orthopedics;  Laterality: Left;    COLONOSCOPY N/A 11/12/2021    Procedure: COLONOSCOPY;  Surgeon: Ishan Abreu MD;  Location: [facility] ENDO (4TH FLR);  Service: Endoscopy;  Laterality: N/A;  fully vaccinated-GT    pt is requesting Dr. Abreu or Dr. Rodrigues-BB    KNEE ARTHROSCOPY W/ MENISCECTOMY Left 11/22/2019    Procedure: ARTHROSCOPY, KNEE, WITH MENISCECTOMY;  Surgeon: Bairon Salas MD;  Location: [facility];  Service: Orthopedics;  Laterality: Left;  Partial medial and lateral    TONSILLECTOMY       Family History   Problem Relation Name Age of Onset    Hypertension Mother      Hypertension Father      Heart disease Other      Melanoma Neg Hx      Amblyopia Neg Hx      Blindness Neg Hx      Glaucoma Neg Hx      Macular degeneration Neg Hx      Retinal detachment Neg Hx      Strabismus Neg Hx      Cataracts Neg Hx       Social History     Tobacco Use    Smoking status: Never    Smokeless tobacco: Never   Substance Use Topics    Alcohol use: Yes     Alcohol/week: 4.0 standard drinks of alcohol     Types: 4 Glasses of wine per week    Drug use: No        Review of Systems:  Review of Systems   Constitutional:  Negative for fever.   Respiratory:  Negative for chest tightness.    Cardiovascular:  Negative for chest pain.   Gastrointestinal:  Negative for nausea and vomiting.   Hematological:  Does not bruise/bleed easily.       OBJECTIVE:     Vital Signs (Most Recent)  Pulse: (!) 58 (11/07/24 1443)  BP: (!) 142/81 (11/07/24 1443)  6' (1.829 m)  90 kg (198 lb 6.6 oz)     Physical  Exam:  Physical Exam  Vitals reviewed.   Constitutional:       Appearance: Normal appearance.   Abdominal:       Skin:     General: Skin is warm and dry.   Neurological:      General: No focal deficit present.      Mental Status: He is alert and oriented to person, place, and time.   Psychiatric:         Mood and Affect: Mood normal.         Behavior: Behavior normal.         Thought Content: Thought content normal.         Judgment: Judgment normal.         Laboratory  CBC: Reviewed  CMP: Reviewed  Likely kenneth    Diagnostic Results:  Echo reviewed    Left Ventricle: There is normal systolic function with a visually estimated ejection fraction of 60 - 65%. There is normal diastolic function. Normal left ventricular filling pressure.    Right Ventricle: Normal right ventricular cavity size. Wall thickness is normal. Right ventricle wall motion  is normal. Systolic function is normal.    Aortic Valve: The aortic valve is a trileaflet valve. There is mild aortic valve sclerosis. There is mild annular calcification present.    Aorta: Aortic root is mildly dilated measuring 4.17 cm. Ascending aorta is mildly dilated measuring 4.3 cm.    Pulmonary Artery: The estimated pulmonary artery systolic pressure is 26 mmHg.    IVC/SVC: Normal venous pressure at 3 mmHg.    ASSESSMENT/PLAN:     Small, growing, partially incarcerated umbilical hernia, very mild symptoms, likely at mild increased risk of strangulation.  I have discussed risks and benefits.    PLAN:       Likely for open repair without mesh at the time of his convenience.

## 2024-11-07 NOTE — PROGRESS NOTES
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         PMH:   Past Medical History:   Diagnosis Date    Ascending aorta dilatation     Atypical nevus 10/11/2022    right nasal ala    BPH (benign prostatic hyperplasia)     Cataract     High cholesterol     Hypertension     Kidney stone        Past Surgical History:   Past Surgical History:   Procedure Laterality Date    ARTHROSCOPIC CHONDROPLASTY OF KNEE JOINT Left 11/22/2019    Procedure: ARTHROSCOPY, KNEE, WITH CHONDROPLASTY;  Surgeon: Bairon Salas MD;  Location: [facility] OR;  Service: Orthopedics;  Laterality: Left;    COLONOSCOPY N/A 11/12/2021    Procedure: COLONOSCOPY;  Surgeon: Ishan Abreu MD;  Location: [facility] ([address] [place] [address]);  Service: Endoscopy;  Laterality: N/A;  fully vaccinated-GT    pt is requesting Dr. Abreu or Dr. Rodrigues-GRADY    KNEE ARTHROSCOPY W/ MENISCECTOMY Left 11/22/2019    Procedure: ARTHROSCOPY, KNEE, WITH MENISCECTOMY;  Surgeon: Bairon Salas MD;  Location: [facility] OR;  Service: Orthopedics;  Laterality: Left;  Partial medial and lateral    TONSILLECTOMY         Social History:  Social History     Socioeconomic History    Marital status:    Occupational History    Occupation: physician   Tobacco Use    Smoking status: Never    Smokeless tobacco: Never   Substance and Sexual Activity    Alcohol use: Yes     Alcohol/week: 4.0 standard drinks of alcohol     Types: 4 Glasses of wine per week    Drug use: No    Sexual activity: Yes     Partners: Female     Social Drivers of Health     Financial Resource Strain: Low Risk  (12/19/2023)    Overall Financial Resource Strain (CARDIA)     Difficulty of Paying Living Expenses: Not hard at all   Food Insecurity: No Food Insecurity (12/19/2023)    Hunger Vital Sign     Worried About Running Out of Food in the Last  Year: Never true     Ran Out of Food in the Last Year: Never true   Transportation Needs: No Transportation Needs (12/19/2023)    PRAPARE - Transportation     Lack of Transportation (Medical): No     Lack of Transportation (Non-Medical): No   Physical Activity: Sufficiently Active (12/19/2023)    Exercise Vital Sign     Days of Exercise per Week: 6 days     Minutes of Exercise per Session: 40 min   Stress: No Stress Concern Present (12/19/2023)    [employer] - Occupational Stress Questionnaire     Feeling of Stress : Not at all   Housing Stability: Low Risk  (12/19/2023)    Housing Stability Vital Sign     Unable to Pay for Housing in the Last Year: No     Number of Places Lived in the Last Year: 1     Unstable Housing in the Last Year: No       Allergies:   Review of patient's allergies indicates:   Allergen Reactions    Levofloxacin Rash       Medications:    Current Outpatient Medications on File Prior to Visit   Medication Sig Dispense Refill    atorvastatin (LIPITOR) 20 MG tablet TAKE ONE TABLET BY MOUTH EVERY DAY 90 tablet 3    famotidine (PEPCID) 20 MG tablet Take 20 mg by mouth once daily.       fish oil-omega-3 fatty acids 300-1,000 mg capsule Take 2 g by mouth once daily.      fluticasone (FLONASE) 50 mcg/actuation nasal spray 1 spray by Each Nare route once daily. 1 Bottle prn    hydroCHLOROthiazide (HYDRODIURIL) 12.5 MG Tab Take 1 tablet (12.5 mg total) by mouth once daily. 90 tablet 4    loratadine 10 mg Cap Take by mouth.      metoprolol succinate (TOPROL-XL) 50 MG 24 hr tablet Take 1 tablet (50 mg total) by mouth once daily. 90 tablet 3    omeprazole (PRILOSEC) 20 MG capsule Take 1 capsule (20 mg total) by mouth once daily. 90 capsule 3    valsartan (DIOVAN) 160 MG tablet Take 1 tablet (160 mg total) by mouth once daily. 90 tablet 3    zolpidem (AMBIEN) 5 MG Tab Take 1 tablet (5 mg total) by mouth nightly as needed (insomnia). 30 tablet 3     Current Facility-Administered  "Medications on File Prior to Visit   Medication Dose Route Frequency Provider Last Rate Last Admin    INV [id number]/placebo Injection 1 Dose  1 Dose Intramuscular 1 time in Clinic/HOD Dede Falk CNS        INV [id number]/placebo Injection 1 Dose  1 Dose Intramuscular 1 time in Clinic/HOD Court Oliver MD        INV [id number]/placebo Injection 1 Dose  1 Dose Intramuscular 1 time in Clinic/HOD Court Oliver MD             Objective:     PHYSICAL EXAM:  Vital Signs (Most Recent)  Pulse: (!) 58 (24 1443)  BP: (!) 142/81 (24 1443)    ROS A 10+ review of systems was performed with pertinent positives and negatives noted above in the history of present illness.  Other systems were negative unless otherwise specified.    Physical Exam:  Physical Exam    Labs:  {Results:[id number]::"I have reviewed all pertinent lab results within the past 24 hours."}    Imaging  The following imaging was reviewed: {Imagin}          Assessment:     74 y.o. male with ***    Plan:     - ***      Nicole Cano MD   [facility], PGY3    "

## 2024-11-07 NOTE — LETTER
November 7, 2024        Wilmer Ohara MD  [address]  [facility] [address]             [facility]  [address]  [facility] [address]  Phone: [phone]   Patient: Jacques Munoz MD   MR Number: [id number]   YOB: 1950   Date of Visit: 11/7/2024       Dear Dr. Ohara:    Thank you for referring Jacques Munoz to me for evaluation. Attached you will find relevant portions of my assessment and plan of care.    If you have questions, please do not hesitate to call me. I look forward to following Jacques Munoz along with you.    Sincerely,      Jonathan Ward MD            CC    No Recipients    Enclosure

## 2024-11-12 ENCOUNTER — OFFICE VISIT (OUTPATIENT)
Dept: HEMATOLOGY/ONCOLOGY | Facility: CLINIC | Age: 74
End: 2024-11-12
Payer: COMMERCIAL

## 2024-11-12 VITALS
OXYGEN SATURATION: 97 % | SYSTOLIC BLOOD PRESSURE: 138 MMHG | BODY MASS INDEX: 27.2 KG/M2 | TEMPERATURE: 98 F | DIASTOLIC BLOOD PRESSURE: 87 MMHG | HEIGHT: 72 IN | WEIGHT: 200.81 LBS | HEART RATE: 58 BPM

## 2024-11-12 DIAGNOSIS — I10 PRIMARY HYPERTENSION: Primary | ICD-10-CM

## 2024-11-12 DIAGNOSIS — E78.00 PURE HYPERCHOLESTEROLEMIA: ICD-10-CM

## 2024-11-12 PROCEDURE — 3075F SYST BP GE 130 - 139MM HG: CPT | Mod: CPTII,S$GLB,, | Performed by: INTERNAL MEDICINE

## 2024-11-12 PROCEDURE — 1101F PT FALLS ASSESS-DOCD LE1/YR: CPT | Mod: CPTII,S$GLB,, | Performed by: INTERNAL MEDICINE

## 2024-11-12 PROCEDURE — 3288F FALL RISK ASSESSMENT DOCD: CPT | Mod: CPTII,S$GLB,, | Performed by: INTERNAL MEDICINE

## 2024-11-12 PROCEDURE — 1126F AMNT PAIN NOTED NONE PRSNT: CPT | Mod: CPTII,S$GLB,, | Performed by: INTERNAL MEDICINE

## 2024-11-12 PROCEDURE — 1159F MED LIST DOCD IN RCRD: CPT | Mod: CPTII,S$GLB,, | Performed by: INTERNAL MEDICINE

## 2024-11-12 PROCEDURE — 99213 OFFICE O/P EST LOW 20 MIN: CPT | Mod: S$GLB,,, | Performed by: INTERNAL MEDICINE

## 2024-11-12 PROCEDURE — 99999 PR PBB SHADOW E&M-EST. PATIENT-LVL IV: CPT | Mod: PBBFAC,,, | Performed by: INTERNAL MEDICINE

## 2024-11-12 PROCEDURE — 4010F ACE/ARB THERAPY RXD/TAKEN: CPT | Mod: CPTII,S$GLB,, | Performed by: INTERNAL MEDICINE

## 2024-11-12 PROCEDURE — 3079F DIAST BP 80-89 MM HG: CPT | Mod: CPTII,S$GLB,, | Performed by: INTERNAL MEDICINE

## 2024-11-12 NOTE — PROGRESS NOTES
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    Chief Complaint: No chief complaint on file.      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  .  Today he reports that he has been feeling well.  His only issue is an umbilical hernia which she is planning to have repaired by Dr. Ward.    He exercises on a regular basis.      He has  been followed  in Cardiology for mild dilation of his aorta - that has been stable over many years.  Last CT 2019.  Echo March 2024 - normal LV function, mild AV sclerosis aortic root dilation 4.17 cm - stable      Negative C-scope in 2021.    Review of Systems   Constitutional:  Negative for appetite change and unexpected weight change.   Eyes:  Negative for visual disturbance.   Respiratory:  Negative for cough and shortness of breath.    Cardiovascular:  Negative for chest pain.   Gastrointestinal:  Negative for abdominal pain and diarrhea.   Genitourinary:  Negative for frequency.   Musculoskeletal:  Negative for back pain.   Skin:  Negative for rash.   Neurological:  Negative for headaches.   Hematological:  Negative for adenopathy.   Psychiatric/Behavioral:  The patient is not nervous/anxious.        Objective:      Physical Exam  Vitals reviewed.   Constitutional:       Appearance: He is well-developed.   HENT:      Head: Normocephalic.      Mouth/Throat:      Pharynx: No oropharyngeal exudate.   Eyes:      General: No scleral icterus.  Neck:      Vascular: No JVD.   Cardiovascular:      Rate and Rhythm: Normal rate and regular rhythm.   Pulmonary:      Effort: Pulmonary effort is normal.      Breath sounds: Normal breath sounds.   Abdominal:      General: Bowel sounds are normal.      Palpations: Abdomen is soft.      Hernia: There is no hernia in the right inguinal area.   Genitourinary:     Testes: Normal.         Right: Mass not present.         Left:  Mass not present.      Epididymis:      Right: Normal.      Left: Normal.   Lymphadenopathy:      Cervical: No cervical adenopathy.   Neurological:      Mental Status: He is alert and oriented to person, place, and time.   Psychiatric:         Behavior: Behavior normal.         Thought Content: Thought content normal.       Assessment:     CBC normal, CMP Amor;i ,1.8, otherwise normal, Julia 175 with HDL 76, LDL 99, TSH normal, PSA 2.3   1. Primary hypertension    2. Pure hypercholesterolemia        Plan:       Return to clinic in 1 year with labs.  Cleared for any potential surgery from my perspective.    Route Chart for Scheduling    Med Onc Chart Routing      Follow up with physician 1 year.   Follow up with [facility]    Infusion scheduling note    Injection scheduling note    Labs CBC, other, TSH and PSA   Scheduling:  Preferred lab:  Lab interval:     Imaging None      Pharmacy appointment No pharmacy appointment needed      Other referrals no referral to Oncology Primary Care needed -  no Massage appointment needed    No additional referrals needed

## 2024-12-11 ENCOUNTER — PATIENT MESSAGE (OUTPATIENT)
Dept: DERMATOLOGY | Facility: CLINIC | Age: 74
End: 2024-12-11
Payer: COMMERCIAL

## 2024-12-12 ENCOUNTER — OFFICE VISIT (OUTPATIENT)
Dept: DERMATOLOGY | Facility: CLINIC | Age: 74
End: 2024-12-12
Payer: COMMERCIAL

## 2024-12-12 DIAGNOSIS — L82.1 SK (SEBORRHEIC KERATOSIS): ICD-10-CM

## 2024-12-12 DIAGNOSIS — L82.0 INFLAMED SEBORRHEIC KERATOSIS: ICD-10-CM

## 2024-12-12 DIAGNOSIS — L91.8 SKIN TAG: ICD-10-CM

## 2024-12-12 DIAGNOSIS — L57.0 AK (ACTINIC KERATOSIS): Primary | ICD-10-CM

## 2024-12-12 DIAGNOSIS — Z86.018 HISTORY OF DYSPLASTIC NEVUS: ICD-10-CM

## 2024-12-12 DIAGNOSIS — D48.5 NEOPLASM OF UNCERTAIN BEHAVIOR OF SKIN: ICD-10-CM

## 2024-12-12 DIAGNOSIS — L81.4 LENTIGO: ICD-10-CM

## 2024-12-12 PROCEDURE — 99999 PR PBB SHADOW E&M-EST. PATIENT-LVL III: CPT | Mod: PBBFAC,,, | Performed by: DERMATOLOGY

## 2024-12-12 NOTE — PROGRESS NOTES
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    This is a high risk patient here to check for the development of new lesions.     Pt has h/o atypical nevus on R nasal ala.     Patient with new area of concern:   Location: R arm- no s/s and L arm-growing    Previous treatments: none    Patient with new area of concern:   Location: R lower leg- itchy   Previous treatments: none            Review of Systems   Skin:  Positive for activity-related sunscreen use. Negative for daily sunscreen use, recent sunburn and wears hat (some times).   Hematologic/Lymphatic: Does not bruise/bleed easily.       Objective:   Physical Exam   Constitutional: He appears well-developed and well-nourished. No distress.   Neurological: He is alert and oriented to person, place, and time. He is not disoriented.   Psychiatric: He has a normal mood and affect.   Skin:   Areas Examined (abnormalities noted in diagram):   Scalp / Hair Palpated and Inspected  Head / Face Inspection Performed  Neck Inspection Performed  Chest / Axilla Inspection Performed  Abdomen Inspection Performed  Back Inspection Performed  RUE Inspected  LUE Inspection Performed  RLE Inspected  LLE Inspection Performed  Nails and Digits Inspection Performed                           Diagram Legend     Erythematous scaling macule/papule c/w actinic keratosis       Vascular papule c/w angioma      Pigmented verrucoid papule/plaque c/w seborrheic keratosis      Yellow umbilicated papule c/w sebaceous hyperplasia      Irregularly shaped tan macule c/w lentigo     1-2 mm smooth white papules consistent with Milia      Movable subcutaneous cyst with punctum c/w epidermal inclusion cyst      Subcutaneous movable cyst c/w pilar cyst      Firm pink to brown papule c/w dermatofibroma      Pedunculated fleshy papule(s) c/w skin tag(s)       Evenly pigmented macule c/w junctional nevus     Mildly variegated pigmented, slightly irregular-bordered macule c/w mildly atypical nevus      Flesh colored to evenly pigmented papule c/w intradermal nevus       Pink pearly papule/plaque c/w basal cell carcinoma      Erythematous hyperkeratotic cursted plaque c/w SCC      Surgical scar with no sign of skin cancer recurrence      Open and closed comedones      Inflammatory papules and pustules      Verrucoid papule consistent consistent with wart     Erythematous eczematous patches and plaques     Dystrophic onycholytic nail with subungual debris c/w onychomycosis     Umbilicated papule    Erythematous-base heme-crusted tan verrucoid plaque consistent with inflamed seborrheic keratosis     Erythematous Silvery Scaling Plaque c/w Psoriasis     See annotation      Assessment / Plan:      Pathology Orders:       Normal Orders This Visit    Specimen to Pathology, Dermatology     Comments:    Number of Specimens:->1  ------------------------->-------------------------  Spec 1 Procedure:->Biopsy  Spec 1 Clinical Impression:->r/o glomangioma v other  Spec 1 Source:->left upper arm    Questions:    Procedure Type: Dermatology and skin neoplasms    Number of Specimens: 1    ------------------------: -------------------------    Spec 1 Procedure: Biopsy    Spec 1 Clinical Impression: r/o glomangioma v other    Spec 1 Source: left upper arm    Release to patient:           AK (actinic keratosis)  Cryosurgery Procedure Note    Verbal consent from the patient is obtained including, but not limited to, risk of hypopigmentation/hyperpigmentation, scar, recurrence of lesion. The patient is aware of the precancerous quality and need for treatment of these lesions. Liquid nitrogen cryosurgery is applied to the 1 actinic keratoses, as detailed in the physical exam, to produce a freeze injury. The patient is aware that blisters may form and is instructed on wound care with gentle  cleansing and use of vaseline ointment to keep moist until healed. The patient is supplied a handout on cryosurgery and is instructed to call if lesions do not completely resolve.    Inflamed seborrheic keratosis  Reassurance given to patient. No treatment is necessary.   Treatment of benign, asymptomatic lesions may be considered cosmetic.    Neoplasm of uncertain behavior of skin  Shave biopsy procedure note:    Shave biopsy performed after verbal consent including risk of infection, scar, recurrence, need for additional treatment of site. Area prepped with alcohol, anesthetized with approximately 1.0cc of 1% lidocaine with epinephrine. Lesional tissue shaved with razor blade. Hemostasis achieved with application of aluminum chloride followed by hyfrecation. No complications. Dressing applied. Wound care explained.    -     Specimen to Pathology, Dermatology    SK (seborrheic keratosis)  These are benign inherited growths without a malignant potential. Reassurance given to patient. No treatment is necessary.     Skin tag  Reassurance given to patient. No treatment is necessary.   Treatment of benign, asymptomatic lesions may be considered cosmetic.    Lentigo  This is a benign hyperpigmented sun induced lesion. Recommend daily sun protection/avoidance and use of at least SPF 30, broad spectrum sunscreen (OTC drug) will reduce the number of new lesions. Treatment of these benign lesions are considered cosmetic.  The nature of sun-induced photo-aging and skin cancers is discussed.  Sun avoidance, protective clothing, and the use of 30-SPF sunscreens is advised. Observe closely for skin damage/changes, and call if such occurs.      History of dysplastic nevus- severe right nose  10/2021  Area of previous atypical nevus/nevi examined. Site well healed with no signs of recurrence.    Total body skin examination performed today including at least 12 points as noted in physical examination. Lesion/lesions suspicious for  atypical nevi noted.               Follow up in about 2 weeks (around 12/26/2024) for PO and 1 year TBSE, prn bx report, TBSE.

## 2024-12-18 DIAGNOSIS — E78.00 PURE HYPERCHOLESTEROLEMIA: ICD-10-CM

## 2024-12-18 DIAGNOSIS — I10 ESSENTIAL HYPERTENSION: ICD-10-CM

## 2024-12-18 RX ORDER — ATORVASTATIN CALCIUM 20 MG/1
TABLET, FILM COATED ORAL
Qty: 90 TABLET | Refills: 3 | Status: SHIPPED | OUTPATIENT
Start: 2024-12-18

## 2024-12-18 RX ORDER — METOPROLOL SUCCINATE 50 MG/1
50 TABLET, EXTENDED RELEASE ORAL DAILY
Qty: 90 TABLET | Refills: 3 | Status: SHIPPED | OUTPATIENT
Start: 2024-12-18

## 2025-01-06 ENCOUNTER — PATIENT OUTREACH (OUTPATIENT)
Dept: INTERNAL MEDICINE | Facility: CLINIC | Age: 75
End: 2025-01-06
Payer: COMMERCIAL

## 2025-01-06 VITALS — SYSTOLIC BLOOD PRESSURE: 138 MMHG | DIASTOLIC BLOOD PRESSURE: 87 MMHG

## 2025-01-09 ENCOUNTER — DOCUMENTATION ONLY (OUTPATIENT)
Dept: HEMATOLOGY/ONCOLOGY | Facility: CLINIC | Age: 75
End: 2025-01-09
Payer: COMMERCIAL

## 2025-02-07 ENCOUNTER — ANESTHESIA EVENT (OUTPATIENT)
Dept: SURGERY | Facility: HOSPITAL | Age: 75
End: 2025-02-07
Payer: COMMERCIAL

## 2025-02-07 ENCOUNTER — TELEPHONE (OUTPATIENT)
Dept: SURGERY | Facility: CLINIC | Age: 75
End: 2025-02-07
Payer: COMMERCIAL

## 2025-02-08 NOTE — ANESTHESIA PREPROCEDURE EVALUATION
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 Patient now presents for the above procedure with Dr. Ward.     Last NPO 9pm yesterday.    Prev airway: Prior LMA    Results for orders placed during the hospital encounter of 03/08/24    Echo    Interpretation Summary    Left Ventricle: There is normal systolic function with a visually estimated ejection fraction of 60 - 65%. There is normal diastolic function. Normal left ventricular filling pressure.    Right Ventricle: Normal right ventricular cavity size. Wall thickness is normal. Right ventricle wall motion  is normal. Systolic function is normal.    Aortic Valve: The aortic valve is a trileaflet valve. There is mild aortic valve sclerosis. There is mild annular calcification present.    Aorta: Aortic root is mildly dilated measuring 4.17 cm. Ascending aorta is mildly dilated measuring 4.3 cm.    Pulmonary Artery: The estimated pulmonary artery systolic pressure is 26 mmHg.    IVC/SVC: Normal venous pressure at 3 mmHg.      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    Ascending aorta dilatation    Agatston coronary artery calcium score less than 100    Nuclear sclerotic cataract of both eyes    Insufficiency of tear film of both eyes    Olecranon bursitis of right elbow    Benign prostatic hyperplasia with nocturia    HSV (herpes simplex virus) dendritic keratitis    Acute medial meniscus tear of left knee    Chronic pain of left knee    Subjective visual  disturbance of both eyes    Hypokalemia    Umbilical hernia without obstruction and without gangrene    History of dysplastic nevus       Review of patient's allergies indicates:   Allergen Reactions    Levofloxacin Rash       Current Inpatient Medications:   INV [id number]/placebo  1 Dose Intramuscular 1 time in Clinic/HOD    INV [id number]/placebo  1 Dose Intramuscular 1 time in Clinic/HOD    INV [id number]/placebo  1 Dose Intramuscular 1 time in Clinic/HOD       Current Facility-Administered Medications on File Prior to Encounter   Medication Dose Route Frequency Provider Last Rate Last Admin    INV CCC566/placebo Injection 1 Dose  1 Dose Intramuscular 1 time in Clinic/HOD Dede Falk CNS        INV [id number]/placebo Injection 1 Dose  1 Dose Intramuscular 1 time in Clinic/HOD Court Oliver MD        INV [id number]/placebo Injection 1 Dose  1 Dose Intramuscular 1 time in Clinic/[facility] Court Oliver MD         Current Outpatient Medications on File Prior to Encounter   Medication Sig Dispense Refill    famotidine (PEPCID) 20 MG tablet Take 20 mg by mouth daily as needed.      fish oil-omega-3 fatty acids 300-1,000 mg capsule Take 2 g by mouth once daily.      fluticasone (FLONASE) 50 mcg/actuation nasal spray 1 spray by Each Nare route once daily. 1 Bottle prn    hydroCHLOROthiazide (HYDRODIURIL) 12.5 MG Tab Take 1 tablet (12.5 mg total) by mouth once daily. 90 tablet 4    loratadine 10 mg Cap Take by mouth. (Patient not taking: Reported on 2/6/2025)      omeprazole (PRILOSEC) 20 MG capsule Take 1 capsule (20 mg total) by mouth once daily. (Patient taking differently: Take 20 mg by mouth daily as needed.) 90 capsule 3    valsartan (DIOVAN) 160 MG tablet Take 1 tablet (160 mg total) by mouth once daily. (Patient taking differently: Take 160 mg by mouth every evening.) 90 tablet 3    zolpidem (AMBIEN) 5 MG Tab Take 1 tablet (5 mg total) by mouth nightly as needed (insomnia). 30 tablet 3       Past Surgical  History:   Procedure Laterality Date    ARTHROSCOPIC CHONDROPLASTY OF KNEE JOINT Left 11/22/2019    Procedure: ARTHROSCOPY, KNEE, WITH CHONDROPLASTY;  Surgeon: Baiorn Salas MD;  Location: [facility] OR;  Service: Orthopedics;  Laterality: Left;    COLONOSCOPY N/A 11/12/2021    Procedure: COLONOSCOPY;  Surgeon: Ishan Abreu MD;  Location: [facility] ENDO (4TH FLR);  Service: Endoscopy;  Laterality: N/A;  fully vaccinated-GT    pt is requesting Dr. Abreu or Dr. Rodrigues-BB    KNEE ARTHROSCOPY W/ MENISCECTOMY Left 11/22/2019    Procedure: ARTHROSCOPY, KNEE, WITH MENISCECTOMY;  Surgeon: Bairon Salas MD;  Location: [facility] OR;  Service: Orthopedics;  Laterality: Left;  Partial medial and lateral    TONSILLECTOMY         Social History     Socioeconomic History    Marital status:    Occupational History    Occupation: physician   Tobacco Use    Smoking status: Never    Smokeless tobacco: Never   Substance and Sexual Activity    Alcohol use: Yes     Alcohol/week: 4.0 standard drinks of alcohol     Types: 4 Glasses of wine per week    Drug use: No    Sexual activity: Yes     Partners: Female     Social Drivers of Health     Financial Resource Strain: Low Risk  (12/19/2023)    Overall Financial Resource Strain (CARDIA)     Difficulty of Paying Living Expenses: Not hard at all   Food Insecurity: No Food Insecurity (12/19/2023)    Hunger Vital Sign     Worried About Running Out of Food in the Last Year: Never true     Ran Out of Food in the Last Year: Never true   Transportation Needs: No Transportation Needs (12/19/2023)    PRAPARE - Transportation     Lack of Transportation (Medical): No     Lack of Transportation (Non-Medical): No   Physical Activity: Sufficiently Active (12/19/2023)    Exercise Vital Sign     Days of Exercise per Week: 6 days     Minutes of Exercise per Session: 40 min   Stress: No Stress Concern Present (12/19/2023)    [employer] [employer] - Occupational Stress Questionnaire      Feeling of Stress : Not at all   Housing Stability: Low Risk  (12/19/2023)    Housing Stability Vital Sign     Unable to Pay for Housing in the Last Year: No     Number of Places Lived in the Last Year: 1     Unstable Housing in the Last Year: No       OBJECTIVE:     Vital Signs Range (Last 24H):         Significant Labs:  Lab Results   Component Value Date    WBC 4.37 11/05/2024    HGB 15.0 11/05/2024    HCT 43.9 11/05/2024     11/05/2024    CHOL 175 11/05/2024    TRIG 77 11/05/2024    HDL 76 (H) 11/05/2024    ALT 19 11/05/2024    AST 22 11/05/2024     11/05/2024    K 4.6 11/05/2024     11/05/2024    CREATININE 0.9 11/05/2024    BUN 11 11/05/2024    CO2 27 11/05/2024    TSH 1.093 11/05/2024    PSA 2.3 11/05/2024    GLUF 106 09/22/2004    HGBA1C 5.5 11/13/2023       Diagnostic Studies: No relevant studies.    EKG:   Results for orders placed or performed in visit on 01/30/24   EKG 12-lead    Collection Time: 01/30/24 11:47 AM    Narrative    Test Reason : I77.810,R93.1,I10,    Vent. Rate : 058 BPM     Atrial Rate : 058 BPM     P-R Int : 170 ms          QRS Dur : 088 ms      QT Int : 400 ms       P-R-T Axes : 035 021 019 degrees     QTc Int : 392 ms    Sinus bradycardia  Otherwise normal ECG  When compared with ECG of 19-NOV-2019 08:41,  No significant change was found  Confirmed by TERRANCE NYE MD ([id number]) on 1/30/2024 12:05:02 PM    Referred By: TREASURE STODDARD           Confirmed By:TERRANCE NYE MD       2D ECHO:  TTE:  Results for orders placed or performed during the hospital encounter of 03/08/24   Echo   Result Value Ref Range    BSA 2.16 m2    LVOT stroke volume 172.24 cm3    LVIDd 5.77 3.5 - 6.0 cm    LV Systolic Volume 33.70 mL    LV Systolic Volume Index 15.7 mL/m2    LVIDs 2.95 2.1 - 4.0 cm    LV Diastolic Volume 164.82 mL    LV Diastolic Volume Index 76.66 mL/m2    IVS 1.0 0.6 - 1.1 cm    LVOT diameter 2.91 cm    LVOT area 6.6 cm2    FS 49 (A) 28 - 44 %    Left Ventricle Relative Wall  "Thickness 0.28 cm    PW 0.81 0.6 - 1.1 cm    LV mass 203.13 g    LV Mass Index 94 g/m2    MV Peak E Ketan 0.55 m/s    TDI LATERAL 0.09 m/s    TDI SEPTAL 0.05 m/s    E/E' ratio 7.86 m/s    MV Peak A Ketan 0.61 m/s    TR Max Ketan 2.38 m/s    E/A ratio 0.90     IVRT 131.30 msec    E wave deceleration time 238.35 msec    MV "A" wave duration 16.27 msec    LV SEPTAL E/E' RATIO 11.00 m/s    KERRI 22.6 mL/m2    LV LATERAL E/E' RATIO 6.11 m/s    LA Vol 48.54 cm3    PV Peak S Ketan 0.65 m/s    PV Peak D Ketan 0.31 m/s    Pulm vein S/D ratio 2.10     LVOT peak ketan 1.19 m/s    RVDD 3.64 cm    TAPSE 1.81 cm    LA size 3.77 cm    Left Atrium Minor Axis 4.77 cm    Left Atrium Major Axis 4.53 cm    LA Vol (MOD) 40.23 cm3    LA WIDTH 3.26 cm    KERRI (MOD) 18.7 mL/m2    RA Major Axis 4.67 cm    RA Width 3.45 cm    AV mean gradient 3 mmHg    AV peak gradient 6 mmHg    Ao peak ketan 1.19 m/s    Ao VTI 27.11 cm    LVOT peak VTI 25.91 cm    AV valve area 6.35 cm²    AV Velocity Ratio 1.00     AV index (prosthetic) 0.96     ANSELMO by Velocity Ratio 6.65 cm²    MV stenosis pressure 1/2 time 69.12 ms    MV valve area p 1/2 method 3.18 cm2    Triscuspid Valve Regurgitation Peak Gradient 23 mmHg    Sinus 4.17 cm    STJ 3.98 cm    Ascending aorta 4.3 cm    Mean e' 0.07 m/s    ZLVIDS -2.89     ZLVIDD -1.90     TV resting pulmonary artery pressure 26 mmHg    RV TB RVSP 5 mmHg    Est. RA pres 3 mmHg    Narrative      Left Ventricle: There is normal systolic function with a visually   estimated ejection fraction of 60 - 65%. There is normal diastolic   function. Normal left ventricular filling pressure.    Right Ventricle: Normal right ventricular cavity size. Wall thickness   is normal. Right ventricle wall motion  is normal. Systolic function is   normal.    Aortic Valve: The aortic valve is a trileaflet valve. There is mild   aortic valve sclerosis. There is mild annular calcification present.    Aorta: Aortic root is mildly dilated measuring 4.17 cm. " Ascending aorta   is mildly dilated measuring 4.3 cm.    Pulmonary Artery: The estimated pulmonary artery systolic pressure is   26 mmHg.    IVC/SVC: Normal venous pressure at 3 mmHg.         ASHER:  No results found for this or any previous visit.    ASSESSMENT/PLAN:       Pre-op Assessment    I have reviewed the Patient Summary Reports.     I have reviewed the Nursing Notes. I have reviewed the NPO Status.   I have reviewed the Medications.     Review of Systems  Anesthesia Hx:  No problems with previous Anesthesia               Denies Personal Hx of Anesthesia complications.                    Social:  Social Alcohol Use, Non-Smoker       Hematology/Oncology:  Hematology Normal   Oncology Normal                                   EENT/Dental:  EENT/Dental Normal           Cardiovascular:  Exercise tolerance: good   Hypertension               Stable mild aortic dilation                           Pulmonary:  Pulmonary Normal                       Renal/:   renal calculi               Hepatic/GI:  Hepatic/GI Normal                    Musculoskeletal:  Musculoskeletal Normal                Neurological:  Neurology Normal                                      Endocrine:  Endocrine Normal            Dermatological:  Skin Normal    Psych:  Psychiatric Normal                    Physical Exam  General: Well nourished, Cooperative, Alert and Oriented    Airway:  Mallampati: II   Mouth Opening: Normal  TM Distance: Normal  Tongue: Normal  Neck ROM: Normal ROM    Dental:  Intact    Chest/Lungs:  Clear to auscultation    Heart:  Rate: Normal  Rhythm: Regular Rhythm        Anesthesia Plan  Type of Anesthesia, risks & benefits discussed:    Anesthesia Type: Gen ETT  Intra-op Monitoring Plan: Standard ASA Monitors  Post Op Pain Control Plan: multimodal analgesia and IV/PO Opioids PRN  Induction:  IV  Airway Plan: Video and Direct, Post-Induction  Informed Consent: Informed consent signed with the Patient and all parties understand  the risks and agree with anesthesia plan.  All questions answered.   ASA Score: 2  Day of Surgery Review of History & Physical: H&P Update referred to the surgeon/provider.    Ready For Surgery From Anesthesia Perspective.     .

## 2025-02-10 ENCOUNTER — HOSPITAL ENCOUNTER (OUTPATIENT)
Facility: HOSPITAL | Age: 75
Discharge: HOME | End: 2025-02-10
Attending: SURGERY | Admitting: SURGERY
Payer: COMMERCIAL

## 2025-02-10 ENCOUNTER — ANESTHESIA (OUTPATIENT)
Dept: SURGERY | Facility: HOSPITAL | Age: 75
End: 2025-02-10
Payer: COMMERCIAL

## 2025-02-10 VITALS
RESPIRATION RATE: 12 BRPM | BODY MASS INDEX: 26.04 KG/M2 | SYSTOLIC BLOOD PRESSURE: 163 MMHG | TEMPERATURE: 97 F | HEART RATE: 62 BPM | WEIGHT: 192 LBS | DIASTOLIC BLOOD PRESSURE: 83 MMHG | OXYGEN SATURATION: 98 %

## 2025-02-10 DIAGNOSIS — K42.9 UMBILICAL HERNIA: ICD-10-CM

## 2025-02-10 DIAGNOSIS — K42.9 UMBILICAL HERNIA WITHOUT OBSTRUCTION AND WITHOUT GANGRENE: Primary | ICD-10-CM

## 2025-02-10 PROCEDURE — 25000003 PHARM REV CODE 250

## 2025-02-10 PROCEDURE — 88302 TISSUE EXAM BY PATHOLOGIST: CPT | Mod: 26,,, | Performed by: STUDENT IN AN ORGANIZED HEALTH CARE EDUCATION/TRAINING PROGRAM

## 2025-02-10 PROCEDURE — 49592 RPR AA HRN 1ST < 3 NCR/STRN: CPT | Mod: ,,, | Performed by: SURGERY

## 2025-02-10 PROCEDURE — 37000008 HC ANESTHESIA 1ST 15 MINUTES: Performed by: SURGERY

## 2025-02-10 PROCEDURE — 63600175 PHARM REV CODE 636 W HCPCS

## 2025-02-10 PROCEDURE — 71000015 HC POSTOP RECOV 1ST HR: Performed by: SURGERY

## 2025-02-10 PROCEDURE — 63600175 PHARM REV CODE 636 W HCPCS: Performed by: SURGERY

## 2025-02-10 PROCEDURE — 36000706: Performed by: SURGERY

## 2025-02-10 PROCEDURE — D9220A PRA ANESTHESIA: Mod: ,,, | Performed by: STUDENT IN AN ORGANIZED HEALTH CARE EDUCATION/TRAINING PROGRAM

## 2025-02-10 PROCEDURE — 71000044 HC DOSC ROUTINE RECOVERY FIRST HOUR: Performed by: SURGERY

## 2025-02-10 PROCEDURE — 37000009 HC ANESTHESIA EA ADD 15 MINS: Performed by: SURGERY

## 2025-02-10 PROCEDURE — 63600175 PHARM REV CODE 636 W HCPCS: Performed by: STUDENT IN AN ORGANIZED HEALTH CARE EDUCATION/TRAINING PROGRAM

## 2025-02-10 PROCEDURE — 76942 ECHO GUIDE FOR BIOPSY: CPT

## 2025-02-10 PROCEDURE — 88302 TISSUE EXAM BY PATHOLOGIST: CPT | Performed by: STUDENT IN AN ORGANIZED HEALTH CARE EDUCATION/TRAINING PROGRAM

## 2025-02-10 PROCEDURE — 36000707: Performed by: SURGERY

## 2025-02-10 RX ORDER — HYDROMORPHONE HYDROCHLORIDE 1 MG/ML
0.2 INJECTION, SOLUTION INTRAMUSCULAR; INTRAVENOUS; SUBCUTANEOUS EVERY 5 MIN PRN
Status: DISCONTINUED | OUTPATIENT
Start: 2025-02-10 | End: 2025-02-10 | Stop reason: HOSPADM

## 2025-02-10 RX ORDER — BUPIVACAINE HYDROCHLORIDE 2.5 MG/ML
INJECTION, SOLUTION EPIDURAL; INFILTRATION; INTRACAUDAL
Status: DISCONTINUED | OUTPATIENT
Start: 2025-02-10 | End: 2025-02-10 | Stop reason: HOSPADM

## 2025-02-10 RX ORDER — HALOPERIDOL 5 MG/ML
0.5 INJECTION INTRAMUSCULAR EVERY 10 MIN PRN
Status: DISCONTINUED | OUTPATIENT
Start: 2025-02-10 | End: 2025-02-10 | Stop reason: HOSPADM

## 2025-02-10 RX ORDER — ROCURONIUM BROMIDE 10 MG/ML
INJECTION, SOLUTION INTRAVENOUS
Status: DISCONTINUED | OUTPATIENT
Start: 2025-02-10 | End: 2025-02-10

## 2025-02-10 RX ORDER — OXYCODONE HYDROCHLORIDE 5 MG/1
5 TABLET ORAL EVERY 4 HOURS PRN
Qty: 3 TABLET | Refills: 0 | Status: SHIPPED | OUTPATIENT
Start: 2025-02-10

## 2025-02-10 RX ORDER — SODIUM CHLORIDE 9 MG/ML
INJECTION, SOLUTION INTRAVENOUS CONTINUOUS
Status: DISCONTINUED | OUTPATIENT
Start: 2025-02-10 | End: 2025-02-10 | Stop reason: HOSPADM

## 2025-02-10 RX ORDER — MIDAZOLAM HYDROCHLORIDE 1 MG/ML
.5-4 INJECTION, SOLUTION INTRAMUSCULAR; INTRAVENOUS
Status: DISCONTINUED | OUTPATIENT
Start: 2025-02-10 | End: 2025-02-10 | Stop reason: HOSPADM

## 2025-02-10 RX ORDER — GLUCAGON 1 MG
1 KIT INJECTION
Status: DISCONTINUED | OUTPATIENT
Start: 2025-02-10 | End: 2025-02-10 | Stop reason: HOSPADM

## 2025-02-10 RX ORDER — CLONIDINE 100 UG/ML
INJECTION, SOLUTION EPIDURAL
Status: COMPLETED | OUTPATIENT
Start: 2025-02-10 | End: 2025-02-10

## 2025-02-10 RX ORDER — SODIUM CHLORIDE 0.9 % (FLUSH) 0.9 %
10 SYRINGE (ML) INJECTION
Status: DISCONTINUED | OUTPATIENT
Start: 2025-02-10 | End: 2025-02-10 | Stop reason: HOSPADM

## 2025-02-10 RX ORDER — PROCHLORPERAZINE EDISYLATE 5 MG/ML
5 INJECTION INTRAMUSCULAR; INTRAVENOUS EVERY 30 MIN PRN
Status: DISCONTINUED | OUTPATIENT
Start: 2025-02-10 | End: 2025-02-10 | Stop reason: HOSPADM

## 2025-02-10 RX ORDER — ONDANSETRON HYDROCHLORIDE 2 MG/ML
INJECTION, SOLUTION INTRAVENOUS
Status: DISCONTINUED | OUTPATIENT
Start: 2025-02-10 | End: 2025-02-10

## 2025-02-10 RX ORDER — LIDOCAINE HYDROCHLORIDE 20 MG/ML
INJECTION, SOLUTION EPIDURAL; INFILTRATION; INTRACAUDAL; PERINEURAL
Status: DISCONTINUED | OUTPATIENT
Start: 2025-02-10 | End: 2025-02-10

## 2025-02-10 RX ORDER — FENTANYL CITRATE 50 UG/ML
25-200 INJECTION, SOLUTION INTRAMUSCULAR; INTRAVENOUS
Status: DISCONTINUED | OUTPATIENT
Start: 2025-02-10 | End: 2025-02-10 | Stop reason: HOSPADM

## 2025-02-10 RX ORDER — DEXAMETHASONE SODIUM PHOSPHATE 10 MG/ML
INJECTION, SOLUTION INTRA-ARTICULAR; INTRALESIONAL; INTRAMUSCULAR; INTRAVENOUS; SOFT TISSUE
Status: COMPLETED | OUTPATIENT
Start: 2025-02-10 | End: 2025-02-10

## 2025-02-10 RX ORDER — LIDOCAINE HYDROCHLORIDE 10 MG/ML
1 INJECTION, SOLUTION EPIDURAL; INFILTRATION; INTRACAUDAL; PERINEURAL ONCE
Status: DISCONTINUED | OUTPATIENT
Start: 2025-02-10 | End: 2025-02-10 | Stop reason: HOSPADM

## 2025-02-10 RX ORDER — PROPOFOL 10 MG/ML
VIAL (ML) INTRAVENOUS
Status: DISCONTINUED | OUTPATIENT
Start: 2025-02-10 | End: 2025-02-10

## 2025-02-10 RX ORDER — CEFAZOLIN 2 G/1
2 INJECTION, POWDER, FOR SOLUTION INTRAMUSCULAR; INTRAVENOUS
Status: COMPLETED | OUTPATIENT
Start: 2025-02-10 | End: 2025-02-10

## 2025-02-10 RX ORDER — ACETAMINOPHEN 500 MG
1000 TABLET ORAL EVERY 8 HOURS
COMMUNITY
Start: 2025-02-10 | End: 2025-02-17

## 2025-02-10 RX ORDER — OXYCODONE HYDROCHLORIDE 5 MG/1
5 TABLET ORAL
Status: DISCONTINUED | OUTPATIENT
Start: 2025-02-10 | End: 2025-02-10 | Stop reason: HOSPADM

## 2025-02-10 RX ORDER — DEXAMETHASONE SODIUM PHOSPHATE 4 MG/ML
INJECTION, SOLUTION INTRA-ARTICULAR; INTRALESIONAL; INTRAMUSCULAR; INTRAVENOUS; SOFT TISSUE
Status: DISCONTINUED | OUTPATIENT
Start: 2025-02-10 | End: 2025-02-10

## 2025-02-10 RX ORDER — ACETAMINOPHEN 500 MG
1000 TABLET ORAL
Status: COMPLETED | OUTPATIENT
Start: 2025-02-10 | End: 2025-02-10

## 2025-02-10 RX ORDER — BUPIVACAINE HYDROCHLORIDE 7.5 MG/ML
INJECTION, SOLUTION EPIDURAL; RETROBULBAR
Status: COMPLETED | OUTPATIENT
Start: 2025-02-10 | End: 2025-02-10

## 2025-02-10 RX ADMIN — FENTANYL CITRATE 50 MCG: 50 INJECTION, SOLUTION INTRAMUSCULAR; INTRAVENOUS at 06:02

## 2025-02-10 RX ADMIN — SODIUM CHLORIDE, SODIUM GLUCONATE, SODIUM ACETATE, POTASSIUM CHLORIDE, MAGNESIUM CHLORIDE, SODIUM PHOSPHATE, DIBASIC, AND POTASSIUM PHOSPHATE: .53; .5; .37; .037; .03; .012; .00082 INJECTION, SOLUTION INTRAVENOUS at 07:02

## 2025-02-10 RX ADMIN — MIDAZOLAM HYDROCHLORIDE 1 MG: 2 INJECTION, SOLUTION INTRAMUSCULAR; INTRAVENOUS at 06:02

## 2025-02-10 RX ADMIN — ROCURONIUM BROMIDE 50 MG: 10 INJECTION, SOLUTION INTRAVENOUS at 07:02

## 2025-02-10 RX ADMIN — LIDOCAINE HYDROCHLORIDE 100 MG: 20 INJECTION, SOLUTION EPIDURAL; INFILTRATION; INTRACAUDAL; PERINEURAL at 07:02

## 2025-02-10 RX ADMIN — ACETAMINOPHEN 1000 MG: 500 TABLET ORAL at 06:02

## 2025-02-10 RX ADMIN — PROPOFOL 200 MG: 10 INJECTION, EMULSION INTRAVENOUS at 07:02

## 2025-02-10 RX ADMIN — CLONIDINE HYDROCHLORIDE 100 MCG: 100 INJECTION, SOLUTION INTRAVENOUS at 06:02

## 2025-02-10 RX ADMIN — BUPIVACAINE HYDROCHLORIDE 30 ML: 7.5 INJECTION, SOLUTION EPIDURAL; RETROBULBAR at 06:02

## 2025-02-10 RX ADMIN — DEXAMETHASONE SODIUM PHOSPHATE 2 MG: 10 INJECTION, SOLUTION INTRAMUSCULAR; INTRAVENOUS at 06:02

## 2025-02-10 RX ADMIN — CEFAZOLIN 2 G: 330 INJECTION, POWDER, FOR SOLUTION INTRAMUSCULAR; INTRAVENOUS at 07:02

## 2025-02-10 RX ADMIN — FENTANYL CITRATE 25 MCG: 50 INJECTION, SOLUTION INTRAMUSCULAR; INTRAVENOUS at 07:02

## 2025-02-10 RX ADMIN — ONDANSETRON 4 MG: 2 INJECTION INTRAMUSCULAR; INTRAVENOUS at 07:02

## 2025-02-10 RX ADMIN — SUGAMMADEX 400 MG: 100 INJECTION, SOLUTION INTRAVENOUS at 07:02

## 2025-02-10 RX ADMIN — DEXAMETHASONE SODIUM PHOSPHATE 4 MG: 4 INJECTION INTRA-ARTICULAR; INTRALESIONAL; INTRAMUSCULAR; INTRAVENOUS; SOFT TISSUE at 07:02

## 2025-02-10 NOTE — ANESTHESIA PROCEDURE NOTES
Bilateral TERI single injection    Patient location during procedure: pre-op   Block not for primary anesthetic.  Reason for block: at surgeon's request and post-op pain management   Post-op Pain Location: ABdominal pain   Start time: 2/10/2025 6:46 AM  Timeout: 2/10/2025 6:45 AM   End time: 2/10/2025 6:50 AM    Staffing  Authorizing Provider: Laurent Mcnair MD  Performing Provider: Jeremy Wagner DO    Staffing  Performed by: Jeremy Wagner DO  Authorized by: Laurent Mcnair MD    Preanesthetic Checklist  Completed: patient identified, IV checked, site marked, risks and benefits discussed, surgical consent, monitors and equipment checked, pre-op evaluation and timeout performed  Peripheral Block  Patient position: sitting  Prep: ChloraPrep  Patient monitoring: heart rate, cardiac monitor, continuous pulse ox, continuous capnometry and frequent blood pressure checks  Block type: erector spinae plane  Laterality: bilateral  Injection technique: single shot  Interspace: T9-10    Needle  Needle type: Stimuplex   Needle gauge: 20 G  Needle length: 4 in  Needle localization: anatomical landmarks and ultrasound guidance   -ultrasound image captured on disc.  Assessment  Injection assessment: negative aspiration, negative parasthesia and local visualized surrounding nerve  Paresthesia pain: none  Heart rate change: no  Slow fractionated injection: yes  Pain Tolerance: comfortable throughout block and no complaints  Medications:    Medications: bupivacaine (pf) (MARCAINE) injection 0.75% - Perineural   30 mL - 2/10/2025 6:50:00 AM  dexAMETHasone sodium phos (PF) injection 10 mg/mL - Other   2 mg - 2/10/2025 6:50:00 AM  cloNIDine injection 100 mcg/mL (epidural) - Perineural   100 mcg - 2/10/2025 6:50:00 AM    Additional Notes  Patient tolerated well.  OR resident providing sedation in pre-op, see intra-op record for vitals. Under ultrasound guidance 30c of 0.375% bupivicaine with epi injected bilaterally, additionally decadron  and clonidine used for block prolongation. Patient tolerated well.

## 2025-02-10 NOTE — H&P
History & Physical     SUBJECTIVE:      History of Present Illness:  Patient is a 74 y.o. male presents with umbilical hernia.  He has noticed it for 3 years and it is growing.  He has some discomfort after heavy working out.  He has gained 5-6 pounds in the last 3 years.  He denies constipation, difficulty urinating and chronic cough.  He does fairly intense exercise.    Interval history 2/5/25:  No significant changes in history.     No chief complaint on file.             Review of patient's allergies indicates:   Allergen Reactions    Levofloxacin Rash         Current Medications          Current Outpatient Medications   Medication Sig Dispense Refill    atorvastatin (LIPITOR) 20 MG tablet TAKE ONE TABLET BY MOUTH EVERY DAY 90 tablet 3    famotidine (PEPCID) 20 MG tablet Take 20 mg by mouth once daily.         fish oil-omega-3 fatty acids 300-1,000 mg capsule Take 2 g by mouth once daily.        fluticasone (FLONASE) 50 mcg/actuation nasal spray 1 spray by Each Nare route once daily. 1 Bottle prn    hydroCHLOROthiazide (HYDRODIURIL) 12.5 MG Tab Take 1 tablet (12.5 mg total) by mouth once daily. 90 tablet 4    loratadine 10 mg Cap Take by mouth.        metoprolol succinate (TOPROL-XL) 50 MG 24 hr tablet Take 1 tablet (50 mg total) by mouth once daily. 90 tablet 3    omeprazole (PRILOSEC) 20 MG capsule Take 1 capsule (20 mg total) by mouth once daily. 90 capsule 3    valsartan (DIOVAN) 160 MG tablet Take 1 tablet (160 mg total) by mouth once daily. 90 tablet 3    zolpidem (AMBIEN) 5 MG Tab Take 1 tablet (5 mg total) by mouth nightly as needed (insomnia). 30 tablet 3                Current Facility-Administered Medications   Medication Dose Route Frequency Provider Last Rate Last Admin    INV [id number]/placebo Injection 1 Dose  1 Dose Intramuscular 1 time in Clinic/HOD Dede Falk CNS        INV [id number]/placebo Injection 1 Dose  1 Dose Intramuscular 1 time in Clinic/HOD Court Oliver MD        INV  [id number]/placebo Injection 1 Dose  1 Dose Intramuscular 1 time in Clinic/HOD Court Oliver MD                     Past Medical History:   Diagnosis Date    Ascending aorta dilatation      Atypical nevus 10/11/2022     right nasal ala    BPH (benign prostatic hyperplasia)      Cataract      High cholesterol      Hypertension      Kidney stone              Past Surgical History:   Procedure Laterality Date    ARTHROSCOPIC CHONDROPLASTY OF KNEE JOINT Left 11/22/2019     Procedure: ARTHROSCOPY, KNEE, WITH CHONDROPLASTY;  Surgeon: Bairon Salas MD;  Location: [facility] OR;  Service: Orthopedics;  Laterality: Left;    COLONOSCOPY N/A 11/12/2021     Procedure: COLONOSCOPY;  Surgeon: Ishan Abreu MD;  [facility]  Service: Endoscopy;  Laterality: N/A;  fully vaccinated-GT    pt is requesting Dr. Abreu or Dr. Rodrigues-GRADY    KNEE ARTHROSCOPY W/ MENISCECTOMY Left 11/22/2019     Procedure: ARTHROSCOPY, KNEE, WITH MENISCECTOMY;  Surgeon: Bairon Salas MD;  Location: [facility] OR;  Service: Orthopedics;  Laterality: Left;  Partial medial and lateral    TONSILLECTOMY                 Family History   Problem Relation Name Age of Onset    Hypertension Mother        Hypertension Father        Heart disease Other        Melanoma Neg Hx        Amblyopia Neg Hx        Blindness Neg Hx        Glaucoma Neg Hx        Macular degeneration Neg Hx        Retinal detachment Neg Hx        Strabismus Neg Hx        Cataracts Neg Hx          Social History   Social History            Tobacco Use    Smoking status: Never    Smokeless tobacco: Never   Substance Use Topics    Alcohol use: Yes       Alcohol/week: 4.0 standard drinks of alcohol       Types: 4 Glasses of wine per week    Drug use: No            Review of Systems:  Review of Systems   Constitutional:  Negative for fever.   Respiratory:  Negative for chest tightness.    Cardiovascular:  Negative for chest pain.   Gastrointestinal:  Negative for nausea and  vomiting.   Hematological:  Does not bruise/bleed easily.         OBJECTIVE:      Vital Signs (Most Recent)  Pulse: (!) 58 (11/07/24 1443)  BP: (!) 142/81 (11/07/24 1443)  6' (1.829 m)  90 kg (198 lb 6.6 oz)      Physical Exam:  Physical Exam  Vitals reviewed.   Constitutional:       Appearance: Normal appearance.   Abdominal:        Skin:     General: Skin is warm and dry.   Neurological:      General: No focal deficit present.      Mental Status: He is alert and oriented to person, place, and time.   Psychiatric:         Mood and Affect: Mood normal.         Behavior: Behavior normal.         Thought Content: Thought content normal.         Judgment: Judgment normal.            Laboratory  CBC: Reviewed  C[facility]: Reviewed  [facility]     Diagnostic Results:  Echo reviewed    Left Ventricle: There is normal systolic function with a visually estimated ejection fraction of 60 - 65%. There is normal diastolic function. Normal left ventricular filling pressure.    Right Ventricle: Normal right ventricular cavity size. Wall thickness is normal. Right ventricle wall motion  is normal. Systolic function is normal.    Aortic Valve: The aortic valve is a trileaflet valve. There is mild aortic valve sclerosis. There is mild annular calcification present.    Aorta: Aortic root is mildly dilated measuring 4.17 cm. Ascending aorta is mildly dilated measuring 4.3 cm.    Pulmonary Artery: The estimated pulmonary artery systolic pressure is 26 mmHg.    IVC/SVC: Normal venous pressure at 3 mmHg.     ASSESSMENT/PLAN:      Small, growing, partially incarcerated umbilical hernia, very mild symptoms, likely at mild increased risk of strangulation.  I have discussed risks and benefits.     PLAN:     For open repair without mesh.

## 2025-02-10 NOTE — ANESTHESIA POSTPROCEDURE EVALUATION
Anesthesia Post Evaluation    Patient: Jacques Munoz MD    Procedure(s) Performed: Procedure(s) (LRB):  REPAIR, HERNIA, UMBILICAL Open without mesh (N/A)    Final Anesthesia Type: general      Patient location during evaluation: PACU  Patient participation: Yes- Able to Participate  Level of consciousness: awake and alert  Post-procedure vital signs: reviewed and stable  Pain management: adequate  Airway patency: patent    PONV status at discharge: No PONV  Anesthetic complications: no      Cardiovascular status: blood pressure returned to baseline and hemodynamically stable  Respiratory status: spontaneous ventilation  Hydration status: euvolemic  Follow-up not needed.              Vitals Value Taken Time   /83 02/10/25 0853   Temp 36.1 °C (97 °F) 02/10/25 0853   Pulse 62 02/10/25 0853   Resp 12 02/10/25 0853   SpO2 98 % 02/10/25 0853         No case tracking events are documented in the log.      Pain/Clarence Score: Pain Rating Prior to Med Admin: 0 (2/10/2025  6:13 AM)  Clarence Score: 9 (2/10/2025  8:05 AM)

## 2025-02-10 NOTE — BRIEF OP NOTE
Operative Note       Surgery Date: 2/10/2025     Surgeons and Role:     * Jonathan Ward MD - Primary     * Yamilex Enriquez MD - Resident - Assisting    Pre-op Diagnosis:  Umbilical hernia without obstruction and without gangrene, incarcerated    Post-op Diagnosis:  Umbilical hernia without obstruction and without gangrene, incarcerated    Procedure(s) (LRB):  REPAIR, HERNIA, UMBILICAL Open without mesh (N/A)    Anesthesia: General/Regional    Procedure in Detail/Findings:  Small incarcerated umbilical hernia with omentum.    Estimated Blood Loss: Minimal           Specimens (From admission, onward)       Start     Ordered    02/10/25 0728  Specimen to Pathology, Surgery General Surgery  Once        Comments: Pre-op Diagnosis: Umbilical hernia without obstruction and without gangrene [K42.9]Procedure(s):REPAIR, HERNIA, UMBILICAL Open without mesh Number of specimens: 1Name of specimens: Incarcerated umbilical hernia-perm     References:    Click here for ordering Quick Tip   Question Answer Comment   Procedure Type: General Surgery    Specimen Class: Routine/Screening    Release to patient Immediate        02/10/25 0730                  Implants: * No implants in log *           Disposition: PACU - hemodynamically stable.           Condition: Good    Attestation:  I was present and scrubbed for the entire procedure.

## 2025-02-10 NOTE — BRIEF OP NOTE
Michael Nino - Surgery ([facility])  Brief Operative Note    Surgery Date: 2/10/2025     Surgeons and Role:     * Jonathan Ward MD - Primary     * Yamilex Enriquez MD - Resident - Assisting        Pre-op Diagnosis:  Umbilical hernia without obstruction and without gangrene [K42.9]    Post-op Diagnosis:  Post-Op Diagnosis Codes:     * Umbilical hernia without obstruction and without gangrene [K42.9]    Procedure(s) (LRB):  REPAIR, HERNIA, UMBILICAL Open without mesh (N/A)    Anesthesia: General/Regional    Operative Findings: 1cm umbilical hernia defect closed primarily    Estimated Blood Loss: 1cc         Specimens:   Specimen (24h ago, onward)       Start     Ordered    02/10/25 0728  Specimen to Pathology, Surgery General Surgery  Once        Comments: Pre-op Diagnosis: Umbilical hernia without obstruction and without gangrene [K42.9]Procedure(s):REPAIR, HERNIA, UMBILICAL Open without mesh Number of specimens: 1Name of specimens: Incarcerated umbilical hernia-perm     References:    Click here for ordering Quick Tip   Question Answer Comment   Procedure Type: General Surgery    Specimen Class: Routine/Screening    Release to patient Immediate        02/10/25 0730                      Discharge Note    OUTCOME: Patient tolerated treatment/procedure well without complication and is now ready for discharge.    DISPOSITION: Home or Self Care    FINAL DIAGNOSIS:  <principal problem not specified>    FOLLOWUP: In clinic    DISCHARGE INSTRUCTIONS:    Discharge Procedure Orders   Diet Adult Regular     Other restrictions (specify):   Order Comments: See patient instructions     Notify your health care provider if you experience any of the following:  increased confusion or weakness     Notify your health care provider if you experience any of the following:  persistent dizziness, light-headedness, or visual disturbances     Notify your health care provider if you experience any of the following:  severe persistent  headache     Notify your health care provider if you experience any of the following:  worsening rash     Notify your health care provider if you experience any of the following:  difficulty breathing or increased cough     Notify your health care provider if you experience any of the following:  redness, tenderness, or signs of infection (pain, swelling, redness, odor or green/yellow discharge around incision site)     Notify your health care provider if you experience any of the following:  severe uncontrolled pain     Notify your health care provider if you experience any of the following:  persistent nausea and vomiting or diarrhea     Notify your health care provider if you experience any of the following:  temperature >100.4     Remove dressing in 48 hours

## 2025-02-10 NOTE — OP NOTE
DATE OF PROCEDURE: 2/10/2025    PRE OP DIAGNOSIS: Umbilical hernia without obstruction and without gangrene, incarcerated    POST OP DIAGNOSIS: Umbilical hernia without obstruction and without gangrene, incarcerated    PROCEDURE: Procedure(s) (LRB):  REPAIR, HERNIA, UMBILICAL Open without mesh (N/A)    Surgeons and Role:     * Jonathan Ward MD - Primary     * Yamilex Enriquez MD - Resident - Assisting    ANESTHESIA: General.     Procedure:    The patient was placed under general anesthesia and the abdomen prepped and draped in the usual manner a vertical incision was made through the navel and over the area of incarcerated omentum with a scalpel.  Subcutaneous tissues were divided with the Bovie and the fascial edges were divided with the Bovie.  Once this was done the incarcerated omentum was divided at the neck of the hernia.  Fascial defect was less than 1 cm and was repaired with 2 figure-of-eight sutures of 2-0 PDS.  The area was inspected for hemostasis in the navel skin which was over the incarcerated omentum was very stretched out and this was excised.  The navel was then was read tacked down with a 3-0 Vicryl and the skin was reapproximated with a 4-0 Monocryl suture.  10 cc of Marcaine were then injected around the repair  Dermabond and a pressure dressing were applied.  The patient tolerated the procedure well was brought to recovery room in stable condition.  Sponge and needle counts were correct at the end of the case.  Blood loss was minimal, complications none and pathology none.

## 2025-02-10 NOTE — DISCHARGE INSTRUCTIONS
Post-Operative Instructions    PAIN CONTROL  Surgery may cause different types of pain and discomfort including abdominal soreness and discomfort, incisional pain, muscle spasm, bloating or constipation.    Starting after surgery, take 650 mg of acetaminophen (Tylenol) every 6-8 hours.  Do not exceed 4000 mg per day.  Acetaminophen helps relieve inflammation.  Starting the day after your surgery, take 800 mg ibuprofen (Advil or Motrin) every 6-8 hours. Do not exceed 3200 mg per day.  Alternate between 650 mg acetaminophen and 800 mg ibuprofen every 3 hours for at least the first three days after surgery for pain control.  For example, take acetaminophen at 7 AM, then 3 hours later at 10 AM take ibuprofen, then 3 hours later at 1 PM take acetaminophen and 3 hours later at 4 PM take ibuprofen, etc...  Most patients do not need narcotic medication.  Narcotics often cause stomach upset and constipation and should be used sparingly.  A printed script or limited dose of narcotic medication (oxycodone) may be provided.  It is important that you avoid constipation.  You can use over the counter stool softeners such as docusate (Colace) or laxatives such as polyethylene glycol (Miralax) to help avoid constipation. Do not take a stool softener or laxative if you have diarrhea or loose stool.  You can use a heat pack or warm compress.     INCISION CARE  Your incisions are covered with skin glue.  This will remain on until your follow up visit with your surgeon.  It is normal for the skin glue to peel at the edges or become sticky.  The overlying dressing may be removed 2 days post-operatively  Some bruising and swelling around the incisions is normal, especially around larger incisions.  The bruising and swelling will gradually go away.  You can use ice or heat packs on your incisions if that helps with pain.  Use them for 30 minutes, then take them off for 30 minutes before using them again.    HOME MEDICATIONS  You may  resume taking your normal medications, with the EXCEPTION of the following:  Wait 3 days after your surgery before taking the following medications unless otherwise instructed by your surgeon or internist/cardiologist: Aspirin or aspirin containing medications (i.e. Goody's, Excedrin), Apixaban (Eliquis), Clopidogrel (Plavix), Dabigatran (Pradaxa), Dipyridamole (Aggrenox), Rivaroxaban (Xarelto), Warfarin (Coumadin), or any other type of blood thinner you may be taking.  Please wait 1 week after surgery to restart herbal medications, vitamins or minerals    ACTIVITIES  You may shower 2 days after surgery.  No tub bathing or swimming (do not submerge in water) until cleared by your surgeon.   You may gradually resume normal activities, depending on your energy level.    Do not drive if you are taking narcotic pain medication.    No lifting or pulling more than 10 lbs for 6-8 weeks or until cleared by your surgeon.    DIET  You have no dietary or food restrictions.  You can eat the foods you normally eat.    Avoid constipation and use a stool softener or laxative to avoid straining and to keep your bowel movements regular.    QUESTIONS OR CONCERNS  If you have any questions or concerns during the daytime, please send a [employer] message to your surgeon or call the surgeon's office.    On nights and weekends, please call [phone] and ask for the General Surgery Resident on call.    For emergencies, difficulty breathing or shortness of breath, please call [phone], or report to the closest Emergency Department    Please notify your provider if you experience any of the following:  Bleeding, redness, warm to the touch, swelling, drainage from surgical incisions, bad smell from incision  Your pain level increases and does not improve with the pain medicines you have been given  Temperature greater than 101.5 F (38.1 C) degrees, chills  Inability to eat, drink fluids, or take medications  Nausea or vomiting  Dizziness or  passing out  Develop a rash  Have pain or trouble urinating, or you pass blood in your urine  Develop a new cough  You are constipated or have diarrhea    UPCOMING APPOINTMENTS  Future Appointments   Date Time Provider Department Center   2/25/2025  8:30 AM Jonathan Ward MD [facility] Nino

## 2025-02-10 NOTE — ANESTHESIA PROCEDURE NOTES
Intubation    Date/Time: 2/10/2025 7:12 AM    Performed by: Jeremy Najera MD  Authorized by: Vadim Fatima MD    Intubation:     Induction:  Intravenous    Intubated:  Postinduction    Mask Ventilation:  Easy with oral airway    Attempts:  1    Attempted By:  Resident anesthesiologist    Method of Intubation:  Video laryngoscopy    Blade:  Moon 3    Laryngeal View Grade: Grade I - full view of cords      Difficult Airway Encountered?: No      Complications:  None    Airway Device:  Oral endotracheal tube    Airway Device Size:  7.5    Style/Cuff Inflation:  Cuffed (inflated to minimal occlusive pressure)    Tube secured:  23    Secured at:  The lips    Placement Verified By:  Capnometry    Complicating Factors:  None    Findings Post-Intubation:  BS equal bilateral and atraumatic/condition of teeth unchanged

## 2025-02-10 NOTE — TRANSFER OF CARE
Anesthesia Transfer of Care Note    Patient: Jacques Munoz MD    Procedure(s) Performed: Procedure(s) (LRB):  REPAIR, HERNIA, UMBILICAL Open without mesh (N/A)    Patient location: [facility]    Anesthesia Type: general    Transport from OR: Transported from OR on 6-10 L/min O2 by face mask with adequate spontaneous ventilation    Post pain: adequate analgesia    Post assessment: no apparent anesthetic complications and tolerated procedure well    Post vital signs: stable    Level of consciousness: awake    Nausea/Vomiting: no nausea/vomiting    Complications: none    Transfer of care protocol was followed      Last vitals: Visit Vitals  BP (!) 170/81   Pulse 68   Temp 36.3 °C (97.3 °F) (Skin)   Resp 12   Wt 87.1 kg (192 lb)   SpO2 100%   BMI 26.04 kg/m²

## 2025-02-10 NOTE — PROGRESS NOTES
Discharge instructions reviewed w/, verbalized understanding. Pt in NADN. Pain tolerable at this time. Tolerated liquids w/ no issues. To be d/c'd home w/ wife  
none

## 2025-02-12 ENCOUNTER — DOCUMENTATION ONLY (OUTPATIENT)
Dept: SURGERY | Facility: CLINIC | Age: 75
End: 2025-02-12
Payer: COMMERCIAL

## 2025-02-12 LAB
FINAL PATHOLOGIC DIAGNOSIS: NORMAL
GROSS: NORMAL
Lab: NORMAL

## 2025-02-12 NOTE — PROGRESS NOTES
Phone call:  He was sore but much better today.  He denies fevers, eating well, urinating ok and has had bowel movement.  He thinks the wound looks ok.

## 2025-02-25 ENCOUNTER — OFFICE VISIT (OUTPATIENT)
Dept: SURGERY | Facility: CLINIC | Age: 75
End: 2025-02-25
Payer: COMMERCIAL

## 2025-02-25 VITALS
WEIGHT: 202.19 LBS | SYSTOLIC BLOOD PRESSURE: 153 MMHG | HEIGHT: 72 IN | DIASTOLIC BLOOD PRESSURE: 84 MMHG | BODY MASS INDEX: 27.39 KG/M2 | HEART RATE: 79 BPM

## 2025-02-25 DIAGNOSIS — Z09 POSTOP CHECK: Primary | ICD-10-CM

## 2025-02-25 PROBLEM — K42.9 UMBILICAL HERNIA WITHOUT OBSTRUCTION AND WITHOUT GANGRENE: Status: RESOLVED | Noted: 2024-11-07 | Resolved: 2025-02-25

## 2025-02-25 PROCEDURE — 99212 OFFICE O/P EST SF 10 MIN: CPT | Mod: S$GLB,,, | Performed by: SURGERY

## 2025-02-25 PROCEDURE — 99999 PR PBB SHADOW E&M-EST. PATIENT-LVL III: CPT | Mod: PBBFAC,,, | Performed by: SURGERY

## 2025-02-25 PROCEDURE — 1159F MED LIST DOCD IN RCRD: CPT | Mod: CPTII,S$GLB,, | Performed by: SURGERY

## 2025-02-25 PROCEDURE — 3288F FALL RISK ASSESSMENT DOCD: CPT | Mod: CPTII,S$GLB,, | Performed by: SURGERY

## 2025-02-25 PROCEDURE — 1160F RVW MEDS BY RX/DR IN RCRD: CPT | Mod: CPTII,S$GLB,, | Performed by: SURGERY

## 2025-02-25 PROCEDURE — 3077F SYST BP >= 140 MM HG: CPT | Mod: CPTII,S$GLB,, | Performed by: SURGERY

## 2025-02-25 PROCEDURE — 1101F PT FALLS ASSESS-DOCD LE1/YR: CPT | Mod: CPTII,S$GLB,, | Performed by: SURGERY

## 2025-02-25 PROCEDURE — 1126F AMNT PAIN NOTED NONE PRSNT: CPT | Mod: CPTII,S$GLB,, | Performed by: SURGERY

## 2025-02-25 PROCEDURE — 3079F DIAST BP 80-89 MM HG: CPT | Mod: CPTII,S$GLB,, | Performed by: SURGERY

## 2025-02-25 NOTE — PROGRESS NOTES
I have seen the patient, reviewed the Student's history and physical, assessment and plan. I have personally interviewed and examined the patient at bedside and: agree with the findings.     S/p open repair of umbilical hernia without mesh 2/10/25.  He is doing well.  Wound clear.  Light duty until 3/24/25 and rtc prn.

## 2025-02-25 NOTE — PROGRESS NOTES
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 Eating a regular diet without difficulty. Bowel movements are Normal.  The patient is not having any pain..      Objective:      BP (!) 153/84 (BP Location: Left arm, Patient Position: Sitting)   Pulse 79   Ht 6' (1.829 m)   Wt 91.7 kg (202 lb 2.6 oz)   BMI 27.42 kg/m²     General:  alert, appears stated age, and cooperative   Abdomen: soft, bowel sounds active, non-tender, no hernias   Incision:   healing well, no drainage, no erythema, no hernia, no seroma, no swelling, no dehiscence, incision well approximated     Pathology:      1 - SOFT TISSUE (&quot;HERNIA SAC&quot;), UMBILICAL HERNIA, RESECTION/HERNIA REPAIR:    - Benign fibroadipose tissue partially covered by mesothelium, consistent with hernia sac.  - NEGATIVE for malignancy.     Assessment:   Mr. Munoz is status post umbilical hernia repair without mesh 2/10/2025 and progressing well. He has no pain at this point in time and is able to eat without difficulty.          Plan:     1. Continue any current medications.  2. Recommend light duty until 3/24/25  3. Follow up as needed.     JULIA Bowling -[facility]

## 2025-04-02 ENCOUNTER — TELEPHONE (OUTPATIENT)
Dept: INFECTIOUS DISEASES | Facility: CLINIC | Age: 75
End: 2025-04-02
Payer: COMMERCIAL

## 2025-04-02 DIAGNOSIS — Z00.00 HEALTHCARE MAINTENANCE: Primary | ICD-10-CM

## 2025-04-02 NOTE — PROGRESS NOTES
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 He previously saw Dr. Damon in the past.  He has been feeling well.  He exercises daily and works with a  2 days a week.  He denies any chest pain, shortness of breath, palpitations or syncope.  He eats a healthy diet.  He checks his blood pressure regularly at home.  It runs typically between 120 and 130/70.  He reports he does have some white coat hypertension.  Had an episode a few weeks ago of sudden onset hearing loss and tinnitus in his right ear which recovered with steroid treatment.    Interval history: He has been doing well. He had umbilical hernia repair done and has recovered nicely. He continues to exercise regularly. 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 Reports some neuropathy in his feet. BP at home running 130/70.    ECG 1/30/24: Personally reviewed by me shows sinus bradycardia at 58 beats per minute.    Renal artery US 10/22:  Conclusion    There is insignificant stenosis (0-59%) in the Right Renal Artery.  Right kidney 11.70 cm.  There is insignificant stenosis (0-59%) in the Left Renal Artery.  Left kidney 12.20 cm.    Echo 3/24:  Summary  Show Result Comparison     Left Ventricle: There is normal systolic function with a visually estimated ejection fraction of 60 - 65%. There is normal diastolic function. Normal left ventricular filling pressure.    Right Ventricle: Normal right ventricular cavity size. Wall thickness is normal. Right ventricle wall motion  is normal. Systolic function is normal.    Aortic Valve: The aortic valve is a trileaflet valve. There is mild aortic valve sclerosis. There is mild annular calcification  present.    Aorta: Aortic root is mildly dilated measuring 4.17 cm. Ascending aorta is mildly dilated measuring 4.3 cm.    Pulmonary Artery: The estimated pulmonary artery systolic pressure is 26 mmHg.    IVC/SVC: Normal venous pressure at 3 mmHg.    Past Medical History:   Diagnosis Date    Ascending aorta dilatation     Atypical nevus 10/11/2022    right nasal ala- moderate/severe    BPH (benign prostatic hyperplasia)     Cataract     High cholesterol     History of dysplastic nevus 12/12/2024    Right ala- moderate to severe 10/2021    Hypertension     Kidney stone        Past Surgical History:   Procedure Laterality Date    ARTHROSCOPIC CHONDROPLASTY OF KNEE JOINT Left 11/22/2019    Procedure: ARTHROSCOPY, KNEE, WITH CHONDROPLASTY;  Surgeon: Bairon Salas MD;  Location: [facility] OR;  Service: Orthopedics;  Laterality: Left;    COLONOSCOPY N/A 11/12/2021    Procedure: COLONOSCOPY;  Surgeon: Ishan Abreu MD;  Location: [facility] (4TH FLR);  Service: Endoscopy;  Laterality: N/A;  fully vaccinated-GT    pt is requesting Dr. Abreu or Dr. Rodrigues-    KNEE ARTHROSCOPY W/ MENISCECTOMY Left 11/22/2019    Procedure: ARTHROSCOPY, KNEE, WITH MENISCECTOMY;  Surgeon: Bairon Salas MD;  Location: [facility] OR;  Service: Orthopedics;  Laterality: Left;  Partial medial and lateral    REPAIR, HERNIA, UMBILICAL N/A 2/10/2025    Procedure: REPAIR, HERNIA, UMBILICAL Open without mesh;  Surgeon: Jonathan Ward MD;  Location: [facility] OR 2ND FLR;  Service: General;  Laterality: N/A;    TONSILLECTOMY         Social History     Socioeconomic History    Marital status:    Occupational History    Occupation: physician   Tobacco Use    Smoking status: Never    Smokeless tobacco: Never   Substance and Sexual Activity    Alcohol use: Yes     Alcohol/week: 4.0 standard drinks of alcohol     Types: 4 Glasses of wine per week     Comment: socially    Drug use: No    Sexual activity: Yes     Partners: Female     Social Drivers of  Health     Financial Resource Strain: Low Risk  (2/18/2025)    Overall Financial Resource Strain (CARDIA)     Difficulty of Paying Living Expenses: Not hard at all   Food Insecurity: No Food Insecurity (2/18/2025)    Hunger Vital Sign     Worried About Running Out of Food in the Last Year: Never true     Ran Out of Food in the Last Year: Never true   Transportation Needs: No Transportation Needs (2/18/2025)    PRAPARE - Transportation     Lack of Transportation (Medical): No     Lack of Transportation (Non-Medical): No   Physical Activity: Sufficiently Active (2/18/2025)    Exercise Vital Sign     Days of Exercise per Week: 6 days     Minutes of Exercise per Session: 30 min   Stress: No Stress Concern Present (2/18/2025)    [employer] - Occupational Stress Questionnaire     Feeling of Stress : Not at all   Housing Stability: Low Risk  (2/18/2025)    Housing Stability Vital Sign     Unable to Pay for Housing in the Last Year: No     Number of Times Moved in the Last Year: 0     Homeless in the Last Year: No       Family History   Problem Relation Name Age of Onset    Hypertension Mother      Hypertension Father      Heart disease Other      Melanoma Neg Hx      Amblyopia Neg Hx      Blindness Neg Hx      Glaucoma Neg Hx      Macular degeneration Neg Hx      Retinal detachment Neg Hx      Strabismus Neg Hx      Cataracts Neg Hx         Patient's Medications   New Prescriptions    No medications on file   Previous Medications    ATORVASTATIN (LIPITOR) 20 MG TABLET    TAKE ONE TABLET BY MOUTH EVERY DAY    FAMOTIDINE (PEPCID) 20 MG TABLET    Take 20 mg by mouth daily as needed.    FISH OIL-OMEGA-3 FATTY ACIDS 300-1,000 MG CAPSULE    Take 2 g by mouth once daily.    FLUTICASONE (FLONASE) 50 MCG/ACTUATION NASAL SPRAY    1 spray by Each Nare route once daily.    HYDROCHLOROTHIAZIDE (HYDRODIURIL) 12.5 MG TAB    Take 1 tablet (12.5 mg total) by mouth once daily.    LORATADINE 10 MG CAP    Take by  mouth.    METOPROLOL SUCCINATE (TOPROL-XL) 50 MG 24 HR TABLET    Take 1 tablet (50 mg total) by mouth once daily.    OMEPRAZOLE (PRILOSEC) 20 MG CAPSULE    Take 1 capsule (20 mg total) by mouth once daily.    OXYCODONE (ROXICODONE) 5 MG IMMEDIATE RELEASE TABLET    Take 1 tablet (5 mg total) by mouth every 4 (four) hours as needed for Pain.    VALSARTAN (DIOVAN) 160 MG TABLET    Take 1 tablet (160 mg total) by mouth once daily.    ZOLPIDEM (AMBIEN) 5 MG TAB    Take 1 tablet (5 mg total) by mouth nightly as needed (insomnia).   Modified Medications    No medications on file   Discontinued Medications    No medications on file       Review of Systems   Constitutional: Negative for malaise/fatigue and weight gain.   HENT:  Negative for hearing loss.    Eyes:  Negative for visual disturbance.   Cardiovascular:  Negative for chest pain, claudication, dyspnea on exertion, leg swelling, near-syncope, orthopnea, palpitations, paroxysmal nocturnal dyspnea and syncope.   Respiratory:  Negative for cough, shortness of breath, sleep disturbances due to breathing, snoring and wheezing.    Endocrine: Negative for cold intolerance, heat intolerance, polydipsia, polyphagia and polyuria.   Hematologic/Lymphatic: Negative for bleeding problem. Does not bruise/bleed easily.   Skin:  Negative for rash and suspicious lesions.   Musculoskeletal:  Negative for arthritis, falls, joint pain, muscle weakness and myalgias.   Gastrointestinal:  Negative for abdominal pain, change in bowel habit, constipation, diarrhea, heartburn, hematochezia, melena and nausea.   Genitourinary:  Negative for hematuria and nocturia.   Neurological:  Positive for sensory change. Negative for excessive daytime sleepiness, dizziness, headaches, light-headedness, loss of balance and weakness.   Psychiatric/Behavioral:  Negative for depression. The patient is not nervous/anxious.    Allergic/Immunologic: Negative for environmental allergies.       BP (!) 145/80 (BP  "Location: Right arm, Patient Position: Sitting)   Pulse 67   Ht 6' 1" (1.854 m)   Wt 91 kg (200 lb 9.9 oz)   SpO2 99%   BMI 26.47 kg/m²     Objective:   Physical Exam  Constitutional:       Appearance: He is well-developed.      Comments:      HENT:      Head: Normocephalic and atraumatic.   Eyes:      General: No scleral icterus.     Conjunctiva/sclera: Conjunctivae normal.      Pupils: Pupils are equal, round, and reactive to light.   Neck:      Thyroid: No thyromegaly.      Vascular: No hepatojugular reflux or JVD.      Trachea: No tracheal deviation.   Cardiovascular:      Rate and Rhythm: Normal rate and regular rhythm.      Chest Wall: PMI is not displaced.      Pulses: Intact distal pulses.           Carotid pulses are 2+ on the right side and 2+ on the left side.       Radial pulses are 2+ on the right side and 2+ on the left side.        Dorsalis pedis pulses are 2+ on the right side and 2+ on the left side.        Posterior tibial pulses are 2+ on the right side and 2+ on the left side.      Heart sounds: Normal heart sounds.   Pulmonary:      Effort: Pulmonary effort is normal.      Breath sounds: Normal breath sounds.   Abdominal:      General: Bowel sounds are normal. There is no distension.      Palpations: Abdomen is soft. There is no mass.      Tenderness: There is no abdominal tenderness.   Musculoskeletal:         General: No tenderness.      Cervical back: Normal range of motion and neck supple.   Lymphadenopathy:      Cervical: No cervical adenopathy.   Skin:     General: Skin is warm and dry.      Findings: No erythema or rash.      Nails: There is no clubbing.   Neurological:      Mental Status: He is alert and oriented to person, place, and time.   Psychiatric:         Speech: Speech normal.         Behavior: Behavior normal.         Lab Results   Component Value Date     11/05/2024    K 4.6 11/05/2024     11/05/2024    CO2 27 11/05/2024    BUN 11 11/05/2024    CREATININE 0.9 " "11/05/2024     11/05/2024    HGBA1C 5.5 11/13/2023    AST 22 11/05/2024    ALT 19 11/05/2024    ALBUMIN 3.7 11/05/2024    PROT 6.1 11/05/2024    BILITOT 1.8 (H) 11/05/2024    WBC 4.37 11/05/2024    HGB 15.0 11/05/2024    HCT 43.9 11/05/2024    MCV 98 11/05/2024     11/05/2024    TSH 1.093 11/05/2024    CHOL 175 11/05/2024    HDL 76 (H) 11/05/2024    LDLCALC 83.6 11/05/2024    TRIG 77 11/05/2024       Assessment:     1. Agatston coronary artery calcium score less than 100 :  Continue atorvastatin.  LDL is at goal.   2. Ascending aorta dilatation :  This had measured 4.3 cm and had been stable since 2014.   3. Primary hypertension :  His blood pressure is at goal on home monitoring.   4. Pure hypercholesterolemia : Lipids are at goal. Continue statin therapy. Following a heart health diet such as the Mediterranean Diet is recommended in addition to 150 minutes a week of moderate intensity exercise to lower cholesterol, maintain a healthy body weight, and improve overall cardiovascular health.       Plan:     Jacques Sotomayor" was seen today for follow-up.    Diagnoses and all orders for this visit:    Agatston coronary artery calcium score less than 100  -     EKG 12-lead; Future  -     Vitamin B12; Future  -     Hemoglobin A1C; Future  -     TSH; Future  -     CBC Auto Differential; Future  -     PSA, Screening; Future    Ascending aorta dilatation    Mixed hyperlipidemia  -     Lipoprotein A (LPA); Future  -     Lipid Panel; Future  -     Comprehensive Metabolic Panel; Future  -     Hemoglobin A1C; Future  -     TSH; Future  -     CBC Auto Differential; Future  -     PSA, Screening; Future    Primary hypertension  -     Lipoprotein A (LPA); Future  -     Lipid Panel; Future  -     Comprehensive Metabolic Panel; Future  -     Hemoglobin A1C; Future  -     TSH; Future  -     CBC Auto Differential; Future  -     PSA, Screening; Future        Thank you for allowing me to participate in this patient's care. " Please do not hesitate to contact me with any questions or concerns.

## 2025-04-03 ENCOUNTER — OFFICE VISIT (OUTPATIENT)
Dept: CARDIOLOGY | Facility: CLINIC | Age: 75
End: 2025-04-03
Payer: COMMERCIAL

## 2025-04-03 VITALS
SYSTOLIC BLOOD PRESSURE: 145 MMHG | HEIGHT: 73 IN | HEART RATE: 67 BPM | WEIGHT: 200.63 LBS | BODY MASS INDEX: 26.59 KG/M2 | OXYGEN SATURATION: 99 % | DIASTOLIC BLOOD PRESSURE: 80 MMHG

## 2025-04-03 DIAGNOSIS — I10 PRIMARY HYPERTENSION: ICD-10-CM

## 2025-04-03 DIAGNOSIS — I77.810 ASCENDING AORTA DILATATION: Chronic | ICD-10-CM

## 2025-04-03 DIAGNOSIS — R93.1 AGATSTON CORONARY ARTERY CALCIUM SCORE LESS THAN 100: Primary | Chronic | ICD-10-CM

## 2025-04-03 DIAGNOSIS — E78.2 MIXED HYPERLIPIDEMIA: ICD-10-CM

## 2025-04-03 PROCEDURE — 3288F FALL RISK ASSESSMENT DOCD: CPT | Mod: CPTII,S$GLB,, | Performed by: INTERNAL MEDICINE

## 2025-04-03 PROCEDURE — 3079F DIAST BP 80-89 MM HG: CPT | Mod: CPTII,S$GLB,, | Performed by: INTERNAL MEDICINE

## 2025-04-03 PROCEDURE — 3077F SYST BP >= 140 MM HG: CPT | Mod: CPTII,S$GLB,, | Performed by: INTERNAL MEDICINE

## 2025-04-03 PROCEDURE — 1101F PT FALLS ASSESS-DOCD LE1/YR: CPT | Mod: CPTII,S$GLB,, | Performed by: INTERNAL MEDICINE

## 2025-04-03 PROCEDURE — 1126F AMNT PAIN NOTED NONE PRSNT: CPT | Mod: CPTII,S$GLB,, | Performed by: INTERNAL MEDICINE

## 2025-04-03 PROCEDURE — 99999 PR PBB SHADOW E&M-EST. PATIENT-LVL IV: CPT | Mod: PBBFAC,,, | Performed by: INTERNAL MEDICINE

## 2025-04-03 PROCEDURE — 99214 OFFICE O/P EST MOD 30 MIN: CPT | Mod: S$GLB,,, | Performed by: INTERNAL MEDICINE

## 2025-04-03 PROCEDURE — 1159F MED LIST DOCD IN RCRD: CPT | Mod: CPTII,S$GLB,, | Performed by: INTERNAL MEDICINE

## 2025-04-06 DIAGNOSIS — K21.9 GASTROESOPHAGEAL REFLUX DISEASE WITHOUT ESOPHAGITIS: ICD-10-CM

## 2025-04-07 ENCOUNTER — CLINICAL SUPPORT (OUTPATIENT)
Dept: INFECTIOUS DISEASES | Facility: CLINIC | Age: 75
End: 2025-04-07
Payer: COMMERCIAL

## 2025-04-07 DIAGNOSIS — Z00.00 HEALTHCARE MAINTENANCE: Primary | ICD-10-CM

## 2025-04-07 PROCEDURE — 91320 SARSCV2 VAC 30MCG TRS-SUC IM: CPT | Mod: S$GLB,,, | Performed by: INTERNAL MEDICINE

## 2025-04-07 PROCEDURE — 99999 PR PBB SHADOW E&M-EST. PATIENT-LVL I: CPT | Mod: PBBFAC,,,

## 2025-04-07 PROCEDURE — 90480 ADMN SARSCOV2 VAC 1/ONLY CMP: CPT | Mod: S$GLB,,, | Performed by: INTERNAL MEDICINE

## 2025-04-07 RX ORDER — OMEPRAZOLE 20 MG/1
20 CAPSULE, DELAYED RELEASE ORAL DAILY
Qty: 90 CAPSULE | Refills: 3 | Status: SHIPPED | OUTPATIENT
Start: 2025-04-07 | End: 2026-04-07

## 2025-04-07 NOTE — PROGRESS NOTES
Pt received Comirnaty IM to left deltoid, pt tolerated injection well and departed from clinic in [facility].

## 2025-05-12 ENCOUNTER — HOSPITAL ENCOUNTER (OUTPATIENT)
Dept: RADIOLOGY | Facility: HOSPITAL | Age: 75
Discharge: HOME | End: 2025-05-12
Attending: PODIATRIST
Payer: COMMERCIAL

## 2025-05-12 ENCOUNTER — OFFICE VISIT (OUTPATIENT)
Dept: PODIATRY | Facility: CLINIC | Age: 75
End: 2025-05-12
Payer: COMMERCIAL

## 2025-05-12 VITALS — SYSTOLIC BLOOD PRESSURE: 165 MMHG | DIASTOLIC BLOOD PRESSURE: 93 MMHG | HEART RATE: 61 BPM

## 2025-05-12 DIAGNOSIS — M79.671 BILATERAL FOOT PAIN: Primary | ICD-10-CM

## 2025-05-12 DIAGNOSIS — M79.672 BILATERAL FOOT PAIN: Primary | ICD-10-CM

## 2025-05-12 DIAGNOSIS — M79.671 BILATERAL FOOT PAIN: ICD-10-CM

## 2025-05-12 DIAGNOSIS — M79.672 BILATERAL FOOT PAIN: ICD-10-CM

## 2025-05-12 PROCEDURE — 1125F AMNT PAIN NOTED PAIN PRSNT: CPT | Mod: CPTII,S$GLB,, | Performed by: PODIATRIST

## 2025-05-12 PROCEDURE — 99999 PR PBB SHADOW E&M-EST. PATIENT-LVL IV: CPT | Mod: PBBFAC,,, | Performed by: PODIATRIST

## 2025-05-12 PROCEDURE — 3080F DIAST BP >= 90 MM HG: CPT | Mod: CPTII,S$GLB,, | Performed by: PODIATRIST

## 2025-05-12 PROCEDURE — 1159F MED LIST DOCD IN RCRD: CPT | Mod: CPTII,S$GLB,, | Performed by: PODIATRIST

## 2025-05-12 PROCEDURE — 73630 X-RAY EXAM OF FOOT: CPT | Mod: TC,50,FY

## 2025-05-12 PROCEDURE — 99203 OFFICE O/P NEW LOW 30 MIN: CPT | Mod: S$GLB,,, | Performed by: PODIATRIST

## 2025-05-12 PROCEDURE — 3077F SYST BP >= 140 MM HG: CPT | Mod: CPTII,S$GLB,, | Performed by: PODIATRIST

## 2025-05-12 PROCEDURE — 1101F PT FALLS ASSESS-DOCD LE1/YR: CPT | Mod: CPTII,S$GLB,, | Performed by: PODIATRIST

## 2025-05-12 PROCEDURE — 3288F FALL RISK ASSESSMENT DOCD: CPT | Mod: CPTII,S$GLB,, | Performed by: PODIATRIST

## 2025-05-12 PROCEDURE — 73630 X-RAY EXAM OF FOOT: CPT | Mod: 26,50,, | Performed by: INTERNAL MEDICINE

## 2025-05-12 PROCEDURE — 1160F RVW MEDS BY RX/DR IN RCRD: CPT | Mod: CPTII,S$GLB,, | Performed by: PODIATRIST

## 2025-05-12 NOTE — PROGRESS NOTES
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    Chief Complaint: Flat Foot and Foot Pain (Bilateral foot pain with pain worse to the left foot)    Presents complaining of progressive pain that is getting worse of the left foot pointing to the medial longitudinal arch over the past 6 months.  Has a history of wearing motion control tennis shoes consisting of [employer]oka Arahi in his currently wearing new balance fresh foam 870 which says hurt his foot worse.  He also has a history of having prior custom orthotics which she can not tolerate but has over-the-counter ones which are semi-rigid and plastic molded from [employer] that he can tolerate in his shoes.  Pending trip to [place] in next 3 weeks.  Pain somewhat alleviated at rest but still has some mild aching to left foot.  Denies any trauma.    Vitals:    05/12/25 1519   BP: (!) 165/93   Pulse: 61   PainSc:   4   PainLoc: Foot      Past Medical History:   Diagnosis Date    Ascending aorta dilatation     Atypical nevus 10/11/2022    right nasal ala- moderate/severe    BPH (benign prostatic hyperplasia)     Cataract     High cholesterol     History of dysplastic nevus 12/12/2024    Right ala- moderate to severe 10/2021    Hypertension     Kidney stone        Past Surgical History:   Procedure Laterality Date    ARTHROSCOPIC CHONDROPLASTY OF KNEE JOINT Left 11/22/2019    Procedure: ARTHROSCOPY, KNEE, WITH CHONDROPLASTY;  Surgeon: Bairon Salas MD;  Location: [facility] OR;  Service: Orthopedics;  Laterality: Left;    COLONOSCOPY N/A 11/12/2021    Procedure: COLONOSCOPY;  Surgeon: Ishan Abreu MD;  Location: [address]);  Service: Endoscopy;  Laterality: N/A;  fully vaccinated-GT    pt is requesting Dr. Abreu or Dr. Rodrigues-BB    KNEE ARTHROSCOPY W/ MENISCECTOMY Left 11/22/2019    Procedure: ARTHROSCOPY, KNEE, WITH MENISCECTOMY;  Surgeon: Bairon Salas MD;  Location: [facility] OR;  Service: Orthopedics;  Laterality: Left;  Partial  medial and lateral    REPAIR, HERNIA, UMBILICAL N/A 2/10/2025    Procedure: REPAIR, HERNIA, UMBILICAL Open without mesh;  Surgeon: Jonathan Ward MD;  Location: [facility] OR [address] [place] [address];  Service: General;  Laterality: N/A;    TONSILLECTOMY         Family History   Problem Relation Name Age of Onset    Hypertension Mother      Hypertension Father      Heart disease Other      Melanoma Neg Hx      Amblyopia Neg Hx      Blindness Neg Hx      Glaucoma Neg Hx      Macular degeneration Neg Hx      Retinal detachment Neg Hx      Strabismus Neg Hx      Cataracts Neg Hx         Social History     Socioeconomic History    Marital status:    Occupational History    Occupation: physician   Tobacco Use    Smoking status: Never    Smokeless tobacco: Never   Substance and Sexual Activity    Alcohol use: Yes     Alcohol/week: 4.0 standard drinks of alcohol     Types: 4 Glasses of wine per week     Comment: socially    Drug use: No    Sexual activity: Yes     Partners: Female     Social Drivers of Health     Financial Resource Strain: Low Risk  (2/18/2025)    Overall Financial Resource Strain (CARDIA)     Difficulty of Paying Living Expenses: Not hard at all   Food Insecurity: No Food Insecurity (2/18/2025)    Hunger Vital Sign     Worried About Running Out of Food in the Last Year: Never true     Ran Out of Food in the Last Year: Never true   Transportation Needs: No Transportation Needs (2/18/2025)    PRAPARE - Transportation     Lack of Transportation (Medical): No     Lack of Transportation (Non-Medical): No   Physical Activity: Sufficiently Active (2/18/2025)    Exercise Vital Sign     Days of Exercise per Week: 6 days     Minutes of Exercise per Session: 30 min   Stress: No Stress Concern Present (2/18/2025)    [employer] - Occupational Stress Questionnaire     Feeling of Stress : Not at all   Housing Stability: Low Risk  (2/18/2025)    Housing Stability Vital Sign     Unable to Pay for  Housing in the Last Year: No     Number of Times Moved in the Last Year: 0     Homeless in the Last Year: No       Current Medications[1]    Review of patient's allergies indicates:   Allergen Reactions    Levofloxacin Rash         Review of Systems   Constitutional: Negative for chills, fever, malaise/fatigue and night sweats.   HENT:  Negative for congestion and hearing loss.    Cardiovascular:  Negative for chest pain and claudication.   Respiratory:  Negative for cough and shortness of breath.    Skin:  Negative for poor wound healing.   Musculoskeletal:  Negative for back pain.   Gastrointestinal:  Negative for nausea and vomiting.   Neurological:  Negative for numbness and paresthesias.   Psychiatric/Behavioral:  Negative for altered mental status.         Objective:     Physical Exam  Constitutional:       General: He is not in acute distress.     Appearance: He is not ill-appearing.   Cardiovascular:      Pulses:           Dorsalis pedis pulses are 2+ on the right side and 2+ on the left side.        Posterior tibial pulses are 2+ on the right side and 2+ on the left side.      Comments: No significant lower extremity edema bilateral.  Skin temp is warm to the foot bilateral.  Digital hair growth present.  Rubor.  Musculoskeletal:      Comments: Flexible pes planovalgus foot type bilateral foot.  There is palpable bony prominence along the plantar aspect of the medial longitudinal arch left foot in the region of the cuboid bone with some mild localized pain on palpation.  No gross instability detected with range motion to the 1st ray and medial column.    Too many toe sign left greater than right.    Active resisted inversion of the left foot is 4/5 compared to 5/5 right lower extremity.  Difficulty with performing heel rise test on the left side causing pain.  Double heel rise test appears intact.    Resting calcaneal stance position appears to be everted greater than 20° on the left and up to 10° on the  "right.  Delete collapse of the medial longitudinal arch with standing bilateral foot.    Left ankle range motion with negative 15° of dorsiflexion with the knee extended that improves to 0° with knee flexion in comparison to the right side has -5 degrees with the knee extended that improves to greater than 10 with knee flexion.   Skin:     General: Skin is warm.      Capillary Refill: Capillary refill takes less than 2 seconds.      Findings: No ecchymosis or erythema.      Nails: There is no clubbing.   Neurological:      Mental Status: He is alert.           Assessment:      Encounter Diagnosis   Name Primary?    Bilateral foot pain Yes     Plan:     Jacques Sotomayor" was seen today for flat foot and foot pain.    Diagnoses and all orders for this visit:    Bilateral foot pain  -     X-Ray Foot Complete 3 view Bilateral; Future      I counseled the patient on his conditions, their implications and medical management.    We discussed obtaining bilateral foot weight-bearing x-ray to further assess the underlying osseous pathology.  There appears to be some progressive collapse and drop of the medial cuneiform to the left foot which is concerning.    Inspected his current shoes and recommend that he continue using the [employer] Arahi which is morphea motion control tennis shoe and more supportive.  He does report that the shoes more comfortable with in his current new balance shoe.  Also inspected his orthotics and we discussed different combinations.  Dispensed, fitted and applied left ankle brace to help further support the posterior tibial tendon in addition to the collapsing medial arch left foot.  Reviewed activity restrictions and modifications.    We briefly discussed potentially just addressing the equinus contracture to left ankle to help him tolerate a custom orthotic.  We had discussed the risks and benefits as well as anticipated postop course in detail.    RTC within 4-6 weeks to re-evaluate and review imaging or " p.r.n. as discussed.    Assisted per Gus Soria DPM PGY 2    A portion of this note was generated by voice recognition software and may contain spelling and grammar errors.      .          [1]   Current Outpatient Medications   Medication Sig Dispense Refill    atorvastatin (LIPITOR) 20 MG tablet TAKE ONE TABLET BY MOUTH EVERY DAY 90 tablet 3    famotidine (PEPCID) 20 MG tablet Take 20 mg by mouth daily as needed.      fish oil-omega-3 fatty acids 300-1,000 mg capsule Take 2 g by mouth once daily.      fluticasone (FLONASE) 50 mcg/actuation nasal spray 1 spray by Each Nare route once daily. 1 Bottle prn    hydroCHLOROthiazide (HYDRODIURIL) 12.5 MG Tab Take 1 tablet (12.5 mg total) by mouth once daily. 90 tablet 4    loratadine 10 mg Cap Take by mouth.      metoprolol succinate (TOPROL-XL) 50 MG 24 hr tablet Take 1 tablet (50 mg total) by mouth once daily. 90 tablet 3    omeprazole (PRILOSEC) 20 MG capsule Take 1 capsule (20 mg total) by mouth once daily. 90 capsule 3    valsartan (DIOVAN) 160 MG tablet Take 1 tablet (160 mg total) by mouth once daily. 90 tablet 3    zolpidem (AMBIEN) 5 MG Tab Take 1 tablet (5 mg total) by mouth nightly as needed (insomnia). 30 tablet 3     Current Facility-Administered Medications   Medication Dose Route Frequency Provider Last Rate Last Admin    INV THV053/placebo Injection 1 Dose  1 Dose Intramuscular 1 time in Clinic/HOD Dede Falk CNS        INV NMC122/placebo Injection 1 Dose  1 Dose Intramuscular 1 time in Clinic/HOD Court Oliver MD        INV [id number]/placebo Injection 1 Dose  1 Dose Intramuscular 1 time in Clinic/HOD Court Oliver MD

## 2025-05-13 ENCOUNTER — TELEPHONE (OUTPATIENT)
Dept: PODIATRY | Facility: CLINIC | Age: 75
End: 2025-05-13
Payer: COMMERCIAL

## 2025-05-13 DIAGNOSIS — M79.672 BILATERAL FOOT PAIN: Primary | ICD-10-CM

## 2025-05-13 DIAGNOSIS — M79.671 BILATERAL FOOT PAIN: Primary | ICD-10-CM

## 2025-05-15 ENCOUNTER — PATIENT MESSAGE (OUTPATIENT)
Dept: PODIATRY | Facility: HOSPITAL | Age: 75
End: 2025-05-15
Payer: COMMERCIAL

## 2025-05-26 DIAGNOSIS — F51.01 PRIMARY INSOMNIA: ICD-10-CM

## 2025-05-27 RX ORDER — ZOLPIDEM TARTRATE 5 MG/1
5 TABLET ORAL NIGHTLY PRN
Qty: 30 TABLET | Refills: 3 | Status: SHIPPED | OUTPATIENT
Start: 2025-05-27 | End: 2025-11-25

## 2025-06-23 ENCOUNTER — OFFICE VISIT (OUTPATIENT)
Dept: PODIATRY | Facility: CLINIC | Age: 75
End: 2025-06-23
Payer: COMMERCIAL

## 2025-06-23 VITALS — DIASTOLIC BLOOD PRESSURE: 83 MMHG | SYSTOLIC BLOOD PRESSURE: 134 MMHG | HEART RATE: 63 BPM

## 2025-06-23 DIAGNOSIS — M24.572 EQUINUS CONTRACTURE OF LEFT ANKLE: ICD-10-CM

## 2025-06-23 DIAGNOSIS — M21.6X2 ACQUIRED PRONATION DEFORMITY OF FOOT, LEFT: ICD-10-CM

## 2025-06-23 DIAGNOSIS — M76.822 POSTERIOR TIBIAL TENDON DYSFUNCTION (PTTD) OF LEFT LOWER EXTREMITY: Primary | ICD-10-CM

## 2025-06-23 DIAGNOSIS — M66.872 NONTRAUMATIC TEAR OF LEFT TIBIALIS POSTERIOR TENDON: ICD-10-CM

## 2025-06-23 PROCEDURE — 1159F MED LIST DOCD IN RCRD: CPT | Mod: CPTII,S$GLB,, | Performed by: PODIATRIST

## 2025-06-23 PROCEDURE — 1126F AMNT PAIN NOTED NONE PRSNT: CPT | Mod: CPTII,S$GLB,, | Performed by: PODIATRIST

## 2025-06-23 PROCEDURE — 99214 OFFICE O/P EST MOD 30 MIN: CPT | Mod: S$GLB,,, | Performed by: PODIATRIST

## 2025-06-23 PROCEDURE — 3075F SYST BP GE 130 - 139MM HG: CPT | Mod: CPTII,S$GLB,, | Performed by: PODIATRIST

## 2025-06-23 PROCEDURE — 3079F DIAST BP 80-89 MM HG: CPT | Mod: CPTII,S$GLB,, | Performed by: PODIATRIST

## 2025-06-23 PROCEDURE — 1101F PT FALLS ASSESS-DOCD LE1/YR: CPT | Mod: CPTII,S$GLB,, | Performed by: PODIATRIST

## 2025-06-23 PROCEDURE — 99999 PR PBB SHADOW E&M-EST. PATIENT-LVL IV: CPT | Mod: PBBFAC,,, | Performed by: PODIATRIST

## 2025-06-23 PROCEDURE — 3288F FALL RISK ASSESSMENT DOCD: CPT | Mod: CPTII,S$GLB,, | Performed by: PODIATRIST

## 2025-06-23 PROCEDURE — 1160F RVW MEDS BY RX/DR IN RCRD: CPT | Mod: CPTII,S$GLB,, | Performed by: PODIATRIST

## 2025-06-23 NOTE — PROGRESS NOTES
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    Chief Complaint: Foot Pain (Left foot and ankle pain; reports the brace has helped)    Presents complaining of progressive pain that is getting worse of the left foot pointing to the medial longitudinal arch over the past 6 months.  Has a history of wearing motion control tennis shoes consisting of Hoka Arahi in his currently wearing new balance fresh foam 870 which says hurt his foot worse.  He also has a history of having prior custom orthotics which she can not tolerate but has over-the-counter ones which are semi-rigid and plastic molded from [employer] that he can tolerate in his shoes.  Pending trip to [place] in next 3 weeks.  Pain somewhat alleviated at rest but still has some mild aching to left foot.  Denies any trauma.    06/23/2025: Follow-up for posterior tibial tendon dysfunction affecting the left lower extremity.  States that the brace helped alleviate his discomfort significantly and he is ambulating with Hoka shoes.  Still has some point tenderness pointing to the medial left ankle.  Accompanied by his wife.  He was able to get through his trip to [facility].    Vitals:    06/23/25 1308   BP: 134/83   Pulse: 63   PainSc: 0-No pain   PainLoc: Foot      Past Medical History:   Diagnosis Date    Ascending aorta dilatation     Atypical nevus 10/11/2022    right nasal ala- moderate/severe    BPH (benign prostatic hyperplasia)     Cataract     High cholesterol     History of dysplastic nevus 12/12/2024    Right ala- moderate to severe 10/2021    Hypertension     Kidney stone        Past Surgical History:   Procedure Laterality Date    ARTHROSCOPIC CHONDROPLASTY OF KNEE JOINT Left 11/22/2019    Procedure: ARTHROSCOPY, KNEE, WITH CHONDROPLASTY;  Surgeon: Bairon Salas MD;  Location: [facility];  Service: Orthopedics;  Laterality: Left;    COLONOSCOPY N/A 11/12/2021    Procedure: COLONOSCOPY;  Surgeon: Ishan Abreu MD;  Location: [facility]  ENDO (4TH FLR);  Service: Endoscopy;  Laterality: N/A;  fully vaccinated-GT    pt is requesting Dr. Abreu or Dr. Rodrigues-BB    KNEE ARTHROSCOPY W/ MENISCECTOMY Left 11/22/2019    Procedure: ARTHROSCOPY, KNEE, WITH MENISCECTOMY;  Surgeon: Bairon Salas MD;  Location: [facility];  Service: Orthopedics;  Laterality: Left;  Partial medial and lateral    REPAIR, HERNIA, UMBILICAL N/A 2/10/2025    Procedure: REPAIR, HERNIA, UMBILICAL Open without mesh;  Surgeon: Jonathan Ward MD;  Location: [facility] OR 2ND FLR;  Service: General;  Laterality: N/A;    TONSILLECTOMY         Family History   Problem Relation Name Age of Onset    Hypertension Mother      Hypertension Father      Heart disease Other      Melanoma Neg Hx      Amblyopia Neg Hx      Blindness Neg Hx      Glaucoma Neg Hx      Macular degeneration Neg Hx      Retinal detachment Neg Hx      Strabismus Neg Hx      Cataracts Neg Hx         Social History     Socioeconomic History    Marital status:    Occupational History    Occupation: physician   Tobacco Use    Smoking status: Never    Smokeless tobacco: Never   Substance and Sexual Activity    Alcohol use: Yes     Alcohol/week: 4.0 standard drinks of alcohol     Types: 4 Glasses of wine per week     Comment: socially    Drug use: No    Sexual activity: Yes     Partners: Female     Social Drivers of Health     Financial Resource Strain: Low Risk  (2/18/2025)    Overall Financial Resource Strain (CARDIA)     Difficulty of Paying Living Expenses: Not hard at all   Food Insecurity: No Food Insecurity (2/18/2025)    Hunger Vital Sign     Worried About Running Out of Food in the Last Year: Never true     Ran Out of Food in the Last Year: Never true   Transportation Needs: No Transportation Needs (2/18/2025)    PRAPARE - Transportation     Lack of Transportation (Medical): No     Lack of Transportation (Non-Medical): No   Physical Activity: Sufficiently Active (2/18/2025)    Exercise Vital Sign     Days of  Exercise per Week: 6 days     Minutes of Exercise per Session: 30 min   Stress: No Stress Concern Present (2/18/2025)    [employer] - Occupational Stress Questionnaire     Feeling of Stress : Not at all   Housing Stability: Low Risk  (2/18/2025)    Housing Stability Vital Sign     Unable to Pay for Housing in the Last Year: No     Number of Times Moved in the Last Year: 0     Homeless in the Last Year: No       Current Medications[1]    Review of patient's allergies indicates:   Allergen Reactions    Levofloxacin Rash         Review of Systems   Constitutional: Negative for chills, fever, malaise/fatigue and night sweats.   HENT:  Negative for congestion and hearing loss.    Cardiovascular:  Negative for chest pain and claudication.   Respiratory:  Negative for cough and shortness of breath.    Skin:  Negative for poor wound healing.   Musculoskeletal:  Negative for back pain.   Gastrointestinal:  Negative for nausea and vomiting.   Neurological:  Negative for numbness and paresthesias.   Psychiatric/Behavioral:  Negative for altered mental status.         Objective:     Physical Exam  Constitutional:       General: He is not in acute distress.     Appearance: He is not ill-appearing.   Cardiovascular:      Pulses:           Dorsalis pedis pulses are 2+ on the right side and 2+ on the left side.        Posterior tibial pulses are 2+ on the right side and 2+ on the left side.      Comments: No significant lower extremity edema bilateral.  Skin temp is warm to the foot bilateral.  Digital hair growth present.  Rubor.  Musculoskeletal:      Comments: Flexible pes planovalgus foot type bilateral foot.  There is palpable bony prominence along the plantar aspect of the medial longitudinal arch left foot in the region of the cuboid bone with some mild localized pain on palpation.  No gross instability detected with range motion to the 1st ray and medial column.    Localized pain on palpation  "posterior to the medial malleolus left ankle overlying the PT tendon which is aggravated during active resisted inversion of the left foot which is also noted to be weak.  Unable to perform single heel rise test on the left attributed to severe pain.    Too many toe sign left greater than right.    Active resisted inversion of the left foot is 4/5 compared to 5/5 right lower extremity.  Difficulty with performing heel rise test on the left side causing pain.  Double heel rise test appears intact.    Resting calcaneal stance position appears to be everted greater than 20° on the left and up to 10° on the right.  Delete collapse of the medial longitudinal arch with standing bilateral foot.    Left ankle range motion with negative 20 ° of dorsiflexion with the knee extended that improves to near neutral with knee flexion in comparison to the right side has -5 degrees with the knee extended that improves to greater than 10 with knee flexion.   Skin:     General: Skin is warm.      Capillary Refill: Capillary refill takes less than 2 seconds.      Findings: No ecchymosis or erythema.      Nails: There is no clubbing.   Neurological:      Mental Status: He is alert.           Assessment:      Encounter Diagnoses   Name Primary?    Posterior tibial tendon dysfunction (PTTD) of left lower extremity Yes    Nontraumatic tear of left tibialis posterior tendon     Acquired pronation deformity of foot, left     Equinus contracture of left ankle      Plan:     Jacques Sotomayor" was seen today for foot pain.    Diagnoses and all orders for this visit:    Posterior tibial tendon dysfunction (PTTD) of left lower extremity  -     MRI Ankle Without Contrast Left; Future    Nontraumatic tear of left tibialis posterior tendon  -     MRI Ankle Without Contrast Left; Future    Acquired pronation deformity of foot, left    Equinus contracture of left ankle      I counseled the patient on his conditions, their implications and medical " management.    Reviewed previous bilateral foot x-ray in detail noting accessory os navicularis left greater than right with greater than 30% uncovering of the talonavicular joint.  Loss of the cyma line bilateral lateral projection.  Moderate decrease in calcaneal inclination angle on the left side compared to the right.  Moderate abduction of the left foot compared to the right with increased talocalcaneal angle bilateral left greater than right.    We discussed obtaining an MRI of the left ankle to assess for a tear of the posterior tibial tendon in order spring ligament contributing to the progressive flatfoot deformity and pain.    Continue bracing to left lower extremity for now.    We discussed possible surgical intervention consisting of a gastrocnemius recession to help improve his left ankle range motion to include possible arthrodesis of the subtalar joint with excision of the accessory navicular bone in addition to repair and advancement of the posterior tibial tendon with arthrodesis of the subtalar joint to realign the subtalar joint in addition to Cotton osteotomy left foot.  We also had discussed addressing the equinus contracture as an isolated procedure with a gastrocnemius recession and then monitoring the foot with further conservative care.  Risks and benefits of both were discussed.  MRI to ascertain the extent of injury to left foot and determine appropriate surgical intervention.    RTC p.r.n. as discussed.  Continue bracing for now.    Assisted per Gus Soria DPM PGY 2    A portion of this note was generated by voice recognition software and may contain spelling and grammar errors.      .            [1]   Current Outpatient Medications   Medication Sig Dispense Refill    atorvastatin (LIPITOR) 20 MG tablet TAKE ONE TABLET BY MOUTH EVERY DAY 90 tablet 3    famotidine (PEPCID) 20 MG tablet Take 20 mg by mouth daily as needed.      fish oil-omega-3 fatty acids 300-1,000 mg capsule Take 2 g by  mouth once daily.      fluticasone (FLONASE) 50 mcg/actuation nasal spray 1 spray by Each Nare route once daily. 1 Bottle prn    hydroCHLOROthiazide 12.5 MG Tab Take 1 tablet (12.5 mg total) by mouth once daily. 90 tablet 4    loratadine 10 mg Cap Take by mouth.      metoprolol succinate (TOPROL-XL) 50 MG 24 hr tablet Take 1 tablet (50 mg total) by mouth once daily. 90 tablet 3    omeprazole (PRILOSEC) 20 MG capsule Take 1 capsule (20 mg total) by mouth once daily. 90 capsule 3    valsartan (DIOVAN) 160 MG tablet Take 1 tablet (160 mg total) by mouth once daily. 90 tablet 3    zolpidem (AMBIEN) 5 MG Tab Take 1 tablet (5 mg total) by mouth nightly as needed (insomnia). 30 tablet 3     Current Facility-Administered Medications   Medication Dose Route Frequency Provider Last Rate Last Admin    INV [id number]/placebo Injection 1 Dose  1 Dose Intramuscular 1 time in Clinic/HOD Dede Falk CNS        INV [id number]/placebo Injection 1 Dose  1 Dose Intramuscular 1 time in Clinic/HOD Court Oliver MD        INV [id number]/placebo Injection 1 Dose  1 Dose Intramuscular 1 time in Clinic/HOD Court Oliver MD

## 2025-06-25 ENCOUNTER — CLINICAL SUPPORT (OUTPATIENT)
Dept: AUDIOLOGY | Facility: CLINIC | Age: 75
End: 2025-06-25
Payer: COMMERCIAL

## 2025-06-25 DIAGNOSIS — H90.3 SENSORINEURAL HEARING LOSS (SNHL) OF BOTH EARS: Primary | ICD-10-CM

## 2025-06-25 PROCEDURE — 99999 PR PBB SHADOW E&M-EST. PATIENT-LVL I: CPT | Mod: PBBFAC,,,

## 2025-06-25 PROCEDURE — 99499 UNLISTED E&M SERVICE: CPT | Mod: S$GLB,,,

## 2025-06-25 PROCEDURE — 92557 COMPREHENSIVE HEARING TEST: CPT | Mod: S$GLB,,,

## 2025-06-25 PROCEDURE — 92567 TYMPANOMETRY: CPT | Mod: S$GLB,,,

## 2025-06-25 NOTE — PROGRESS NOTES
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  Patient reported that he only wears his hearing aids when he goes out, not in quiet environments.  A clean and check of the hearing aids was performed, filters replaced, and domes cleaned.  Daily and consistent use of the hearing aids was discussed with Dr. Munoz as a way to best facilitate better hearing in noise.  Gain was increased slightly in the All-around program.  Speech in noise and speech in loud noise was increased in the Speech in noise program.  Dr. Munoz noted that soft sounds, such a crinkling paper, were very loud.  Soft high frequency pitches were decreased slightly in the All-around program.  We reviewed the [employer] and ways he can adjust the hearing aids.   The physical fit of the hearing aids are good and Dr. Munoz reported good subjective benefit with the changes made today.    Recommendations:  Daily and consistent use of amplification  Hearing aid follow-up as needed  Change wax filters as needed  Use alcohol wipe to clean dome, replace as needed  Annual audiologic evaluation or sooner if change perceived  Hearing protection in noise

## 2025-06-25 NOTE — PROGRESS NOTES
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 Dr. Munoz reported a possible decrease in hearing sensitivity. He reported constant bilateral tinnitus. Dr. Munoz denied dizziness.  Patient wears bilateral Resound hearing aids.    Tympanometry revealed Type A in the right ear and Type A in the left ear.     Audiogram results revealed normal hearing to a moderate sensorineural hearing loss (SNHL) in the right ear and normal hearing to a moderate SNHL in the left ear.      Speech reception thresholds were noted at 20 dBHL in the right ear and 20 dBHL in the left ear.    Speech discrimination scores were 100% in the right ear and 100% in the left ear.    Recommendations:  Otologic evaluation  Continue daily and proper use of bilateral amplification  Hearing aid adjustment as needed  Annual audiogram or sooner if change perceived  Hearing protection in noise

## 2025-07-09 NOTE — PROGRESS NOTES
Medication: citalopram (CeleXA) 20 MG tablet   Medication refill denied due to pt establishing care with a different provider on 8/14/2025.     Refer to research note.

## 2025-07-14 ENCOUNTER — HOSPITAL ENCOUNTER (OUTPATIENT)
Dept: RADIOLOGY | Facility: HOSPITAL | Age: 75
Discharge: HOME | End: 2025-07-14
Attending: PODIATRIST
Payer: COMMERCIAL

## 2025-07-14 DIAGNOSIS — M76.822 POSTERIOR TIBIAL TENDON DYSFUNCTION (PTTD) OF LEFT LOWER EXTREMITY: ICD-10-CM

## 2025-07-14 DIAGNOSIS — M66.872 NONTRAUMATIC TEAR OF LEFT TIBIALIS POSTERIOR TENDON: ICD-10-CM

## 2025-07-14 PROCEDURE — 73721 MRI JNT OF LWR EXTRE W/O DYE: CPT | Mod: 26,LT,, | Performed by: RADIOLOGY

## 2025-07-14 PROCEDURE — 73721 MRI JNT OF LWR EXTRE W/O DYE: CPT | Mod: TC,LT

## 2025-07-15 ENCOUNTER — PATIENT MESSAGE (OUTPATIENT)
Dept: PODIATRY | Facility: CLINIC | Age: 75
End: 2025-07-15
Payer: COMMERCIAL

## 2025-07-21 ENCOUNTER — OFFICE VISIT (OUTPATIENT)
Dept: PODIATRY | Facility: CLINIC | Age: 75
End: 2025-07-21
Payer: COMMERCIAL

## 2025-07-21 VITALS
HEIGHT: 73 IN | BODY MASS INDEX: 26.59 KG/M2 | DIASTOLIC BLOOD PRESSURE: 89 MMHG | WEIGHT: 200.63 LBS | HEART RATE: 58 BPM | SYSTOLIC BLOOD PRESSURE: 159 MMHG

## 2025-07-21 DIAGNOSIS — M66.872 NONTRAUMATIC TEAR OF LEFT TIBIALIS POSTERIOR TENDON: ICD-10-CM

## 2025-07-21 DIAGNOSIS — M76.822 POSTERIOR TIBIAL TENDON DYSFUNCTION (PTTD) OF LEFT LOWER EXTREMITY: Primary | ICD-10-CM

## 2025-07-21 DIAGNOSIS — M21.6X2 ACQUIRED PRONATION DEFORMITY OF FOOT, LEFT: ICD-10-CM

## 2025-07-21 DIAGNOSIS — M24.572 EQUINUS CONTRACTURE OF LEFT ANKLE: ICD-10-CM

## 2025-07-21 PROCEDURE — 3288F FALL RISK ASSESSMENT DOCD: CPT | Mod: CPTII,S$GLB,, | Performed by: PODIATRIST

## 2025-07-21 PROCEDURE — 1125F AMNT PAIN NOTED PAIN PRSNT: CPT | Mod: CPTII,S$GLB,, | Performed by: PODIATRIST

## 2025-07-21 PROCEDURE — 1101F PT FALLS ASSESS-DOCD LE1/YR: CPT | Mod: CPTII,S$GLB,, | Performed by: PODIATRIST

## 2025-07-21 PROCEDURE — 3077F SYST BP >= 140 MM HG: CPT | Mod: CPTII,S$GLB,, | Performed by: PODIATRIST

## 2025-07-21 PROCEDURE — 3079F DIAST BP 80-89 MM HG: CPT | Mod: CPTII,S$GLB,, | Performed by: PODIATRIST

## 2025-07-21 PROCEDURE — 1159F MED LIST DOCD IN RCRD: CPT | Mod: CPTII,S$GLB,, | Performed by: PODIATRIST

## 2025-07-21 PROCEDURE — 99999 PR PBB SHADOW E&M-EST. PATIENT-LVL IV: CPT | Mod: PBBFAC,,, | Performed by: PODIATRIST

## 2025-07-21 PROCEDURE — 1160F RVW MEDS BY RX/DR IN RCRD: CPT | Mod: CPTII,S$GLB,, | Performed by: PODIATRIST

## 2025-07-21 PROCEDURE — 99213 OFFICE O/P EST LOW 20 MIN: CPT | Mod: S$GLB,,, | Performed by: PODIATRIST

## 2025-07-21 NOTE — PROGRESS NOTES
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    Chief Complaint: Foot Pain    Presents complaining of progressive pain that is getting worse of the left foot pointing to the medial longitudinal arch over the past 6 months.  Has a history of wearing motion control tennis shoes consisting of Hoka Arahi in his currently wearing new balance fresh foam 870 which says hurt his foot worse.  He also has a history of having prior custom orthotics which she can not tolerate but has over-the-counter ones which are semi-rigid and plastic molded from [employer] that he can tolerate in his shoes.  Pending trip to [place] in next 3 weeks.  Pain somewhat alleviated at rest but still has some mild aching to left foot.  Denies any trauma.    06/23/2025: Follow-up for posterior tibial tendon dysfunction affecting the left lower extremity.  States that the brace helped alleviate his discomfort significantly and he is ambulating with Hoka shoes.  Still has some point tenderness pointing to the medial left ankle.  Accompanied by his wife.  He was able to get through his trip to [facility], [place].    Vitals:    07/21/25 1132   BP: (!) 159/89   Pulse: (!) 58   Weight: 91 kg (200 lb 9.9 oz)   Height: 6' 1" (1.854 m)   PainSc:   6      Past Medical History:   Diagnosis Date    Ascending aorta dilatation     Atypical nevus 10/11/2022    right nasal ala- moderate/severe    BPH (benign prostatic hyperplasia)     Cataract     High cholesterol     History of dysplastic nevus 12/12/2024    Right ala- moderate to severe 10/2021    Hypertension     Kidney stone        Past Surgical History:   Procedure Laterality Date    ARTHROSCOPIC CHONDROPLASTY OF KNEE JOINT Left 11/22/2019    Procedure: ARTHROSCOPY, KNEE, WITH CHONDROPLASTY;  Surgeon: Bairno Salas MD;  Location: [facility];  Service: Orthopedics;  Laterality: Left;    COLONOSCOPY N/A 11/12/2021    Procedure: COLONOSCOPY;  Surgeon: Ishan Abreu MD;  Location: [facility] (4TH " FLR);  Service: Endoscopy;  Laterality: N/A;  fully vaccinated-GT    pt is requesting Dr. Abreu or Dr. Rodrigues-BB    KNEE ARTHROSCOPY W/ MENISCECTOMY Left 11/22/2019    Procedure: ARTHROSCOPY, KNEE, WITH MENISCECTOMY;  Surgeon: Bairon Salas MD;  Location: [facility];  Service: Orthopedics;  Laterality: Left;  Partial medial and lateral    REPAIR, HERNIA, UMBILICAL N/A 2/10/2025    Procedure: REPAIR, HERNIA, UMBILICAL Open without mesh;  Surgeon: Jonathan Ward MD;  [place] [facility] [address] [place] [address];  Service: General;  Laterality: N/A;    TONSILLECTOMY         Family History   Problem Relation Name Age of Onset    Hypertension Mother      Hypertension Father      Heart disease Other      Melanoma Neg Hx      Amblyopia Neg Hx      Blindness Neg Hx      Glaucoma Neg Hx      Macular degeneration Neg Hx      Retinal detachment Neg Hx      Strabismus Neg Hx      Cataracts Neg Hx         Social History     Socioeconomic History    Marital status:    Occupational History    Occupation: physician   Tobacco Use    Smoking status: Never    Smokeless tobacco: Never   Substance and Sexual Activity    Alcohol use: Yes     Alcohol/week: 4.0 standard drinks of alcohol     Types: 4 Glasses of wine per week     Comment: socially    Drug use: No    Sexual activity: Yes     Partners: Female     Social Drivers of Health     Financial Resource Strain: Low Risk  (2/18/2025)    Overall Financial Resource Strain (CARDIA)     Difficulty of Paying Living Expenses: Not hard at all   Food Insecurity: No Food Insecurity (2/18/2025)    Hunger Vital Sign     Worried About Running Out of Food in the Last Year: Never true     Ran Out of Food in the Last Year: Never true   Transportation Needs: No Transportation Needs (2/18/2025)    PRAPARE - Transportation     Lack of Transportation (Medical): No     Lack of Transportation (Non-Medical): No   Physical Activity: Sufficiently Active (2/18/2025)    Exercise Vital Sign     Days of Exercise  per Week: 6 days     Minutes of Exercise per Session: 30 min   Stress: No Stress Concern Present (2/18/2025)    [employer] - Occupational Stress Questionnaire     Feeling of Stress : Not at all   Housing Stability: Low Risk  (2/18/2025)    Housing Stability Vital Sign     Unable to Pay for Housing in the Last Year: No     Number of Times Moved in the Last Year: 0     Homeless in the Last Year: No       Current Medications[1]    Review of patient's allergies indicates:   Allergen Reactions    Levofloxacin Rash         Review of Systems   Constitutional: Negative for chills, fever, malaise/fatigue and night sweats.   HENT:  Negative for congestion and hearing loss.    Cardiovascular:  Negative for chest pain and claudication.   Respiratory:  Negative for cough and shortness of breath.    Skin:  Negative for poor wound healing.   Musculoskeletal:  Negative for back pain.   Gastrointestinal:  Negative for nausea and vomiting.   Neurological:  Negative for numbness and paresthesias.   Psychiatric/Behavioral:  Negative for altered mental status.         Objective:     Physical Exam  Constitutional:       General: He is not in acute distress.     Appearance: He is not ill-appearing.   Cardiovascular:      Pulses:           Dorsalis pedis pulses are 2+ on the right side and 2+ on the left side.        Posterior tibial pulses are 2+ on the right side and 2+ on the left side.      Comments: No significant lower extremity edema bilateral.  Skin temp is warm to the foot bilateral.  Digital hair growth present.  Rubor.  Musculoskeletal:      Comments: Flexible pes planovalgus foot type bilateral foot.  There is palpable bony prominence along the plantar aspect of the medial longitudinal arch left foot in the region of the cuboid bone with some mild localized pain on palpation.  No gross instability detected with range motion to the 1st ray and medial column.    Localized pain on palpation posterior  "to the medial malleolus left ankle overlying the PT tendon which is aggravated during active resisted inversion of the left foot which is also noted to be weak.  Unable to perform single heel rise test on the left.    Too many toe sign left greater than right.    Active resisted inversion of the left foot is 4/5 compared to 5/5 right lower extremity.  Difficulty with performing heel rise test on the left side causing pain.  Double heel rise test appears intact.    Resting calcaneal stance position appears to be everted greater than 20° on the left and up to 10° on the right.  Delete collapse of the medial longitudinal arch with standing bilateral foot.    Left ankle range motion with negative 20 ° of dorsiflexion with the knee extended that improves to near neutral with knee flexion in comparison to the right side has -5 degrees with the knee extended that improves to greater than 10 with knee flexion.   Skin:     General: Skin is warm.      Capillary Refill: Capillary refill takes less than 2 seconds.      Findings: No ecchymosis or erythema.      Nails: There is no clubbing.   Neurological:      Mental Status: He is alert.           Assessment:      Encounter Diagnoses   Name Primary?    Posterior tibial tendon dysfunction (PTTD) of left lower extremity Yes    Nontraumatic tear of left tibialis posterior tendon     Acquired pronation deformity of foot, left     Equinus contracture of left ankle        Plan:     Jacques Sotomayor" was seen today for foot pain.    Diagnoses and all orders for this visit:    Posterior tibial tendon dysfunction (PTTD) of left lower extremity    Nontraumatic tear of left tibialis posterior tendon    Acquired pronation deformity of foot, left    Equinus contracture of left ankle        I counseled the patient on his conditions, their implications and medical management.    Reviewed previous bilateral foot x-ray in detail noting accessory os navicularis left greater than right with greater " than 30% uncovering of the talonavicular joint.  Loss of the cyma line bilateral lateral projection.  Moderate decrease in calcaneal inclination angle on the left side compared to the right.  Moderate abduction of the left foot compared to the right with increased talocalcaneal angle bilateral left greater than right.    MRI findings of the left ankle were reviewed in detail noting the accessory navicular bone in addition to longitudinal split tear of the posterior tibial tendon commencing at the retromalleolar area.  Imaging from the MRI in addition to use of the bone marrow was utilized to explain the alignment of the foot as well as the pathologic findings.    We discussed continued conservative care such as persistent bracing with shoe gear modifications and activity modifications versus 2 different options for intervention.  We discussed initially performing gastrocnemius recession of the left leg to help improve his range motion to left ankle and in order from the tolerate bracing and orthotic use.  We could monitor this for 3-4 months and allow further healing to the posterior tibial tendon in addition to physical therapy versus a single staged approach which would consist of gastrocnemius recession left leg, arthrodesis of the subtalar joint to realign the foot, excision of the accessory navicular with repair and advancement of the posterior tibial tendon depending on intraop findings versus FDL tendon transfer with her without augmentation of the spring ligament in addition to possible Cotton osteotomy left foot.  Risks, benefits anticipated postoperative course for both options were discussed in detail.  I do not believe that gastrocnemius recession with repair of the tendon would work at this time especially since he excessively pronates.  We did discuss that he could also talk to a 2nd or 3rd opinion if he wants further information.  Patient would like to consider his options for now and we will reach  out to me if he decides to have surgical intervention.  I did review some light resistance exercises that he could perform the focus on inversion performing up to 15-20 reps 2-3 times per week but I think therapy with a structured program would work better he decides to go purely if conservative care however he would require extensive therapy postop.    RTC p.r.n. as discussed.    Assisted by Misael Howard DPM PGY 3    A portion of this note was generated by voice recognition software and may contain spelling and grammar errors.      .              [1]   Current Outpatient Medications   Medication Sig Dispense Refill    atorvastatin (LIPITOR) 20 MG tablet TAKE ONE TABLET BY MOUTH EVERY DAY 90 tablet 3    famotidine (PEPCID) 20 MG tablet Take 20 mg by mouth daily as needed.      fish oil-omega-3 fatty acids 300-1,000 mg capsule Take 2 g by mouth once daily.      fluticasone (FLONASE) 50 mcg/actuation nasal spray 1 spray by Each Nare route once daily. 1 Bottle prn    hydroCHLOROthiazide 12.5 MG Tab Take 1 tablet (12.5 mg total) by mouth once daily. 90 tablet 4    loratadine 10 mg Cap Take by mouth.      metoprolol succinate (TOPROL-XL) 50 MG 24 hr tablet Take 1 tablet (50 mg total) by mouth once daily. 90 tablet 3    omeprazole (PRILOSEC) 20 MG capsule Take 1 capsule (20 mg total) by mouth once daily. 90 capsule 3    valsartan (DIOVAN) 160 MG tablet Take 1 tablet (160 mg total) by mouth once daily. 90 tablet 3    zolpidem (AMBIEN) 5 MG Tab Take 1 tablet (5 mg total) by mouth nightly as needed (insomnia). 30 tablet 3     Current Facility-Administered Medications   Medication Dose Route Frequency Provider Last Rate Last Admin    INV KWK405/placebo Injection 1 Dose  1 Dose Intramuscular 1 time in Clinic/HOD Dede Falk CNS        INV [id number]/placebo Injection 1 Dose  1 Dose Intramuscular 1 time in Clinic/HOD Court Oliver MD        INV [id number]/placebo Injection 1 Dose  1 Dose Intramuscular 1 time in Clinic/HOD  Court Oliver MD

## 2025-08-29 RX ORDER — HYDROCHLOROTHIAZIDE 12.5 MG/1
12.5 TABLET ORAL DAILY
Qty: 90 TABLET | Refills: 4 | Status: SHIPPED | OUTPATIENT
Start: 2025-08-29 | End: 2026-08-29

## 2025-08-29 RX ORDER — VALSARTAN 160 MG/1
160 TABLET ORAL DAILY
Qty: 90 TABLET | Refills: 0 | Status: SHIPPED | OUTPATIENT
Start: 2025-08-29 | End: 2025-11-27

## 2025-09-03 ENCOUNTER — OFFICE VISIT (OUTPATIENT)
Dept: SPORTS MEDICINE | Facility: CLINIC | Age: 75
End: 2025-09-03
Payer: COMMERCIAL

## 2025-09-03 VITALS
WEIGHT: 200.31 LBS | DIASTOLIC BLOOD PRESSURE: 84 MMHG | SYSTOLIC BLOOD PRESSURE: 164 MMHG | BODY MASS INDEX: 26.55 KG/M2 | HEIGHT: 73 IN | HEART RATE: 60 BPM

## 2025-09-03 DIAGNOSIS — M76.822 INSUFFICIENCY OF LEFT POSTERIOR TIBIAL TENDON: Primary | ICD-10-CM

## 2025-09-03 PROCEDURE — 1159F MED LIST DOCD IN RCRD: CPT | Mod: CPTII,S$GLB,, | Performed by: SURGERY

## 2025-09-03 PROCEDURE — 3077F SYST BP >= 140 MM HG: CPT | Mod: CPTII,S$GLB,, | Performed by: SURGERY

## 2025-09-03 PROCEDURE — 1125F AMNT PAIN NOTED PAIN PRSNT: CPT | Mod: CPTII,S$GLB,, | Performed by: SURGERY

## 2025-09-03 PROCEDURE — 99204 OFFICE O/P NEW MOD 45 MIN: CPT | Mod: S$GLB,,, | Performed by: SURGERY

## 2025-09-03 PROCEDURE — 3079F DIAST BP 80-89 MM HG: CPT | Mod: CPTII,S$GLB,, | Performed by: SURGERY

## 2025-09-03 PROCEDURE — 3288F FALL RISK ASSESSMENT DOCD: CPT | Mod: CPTII,S$GLB,, | Performed by: SURGERY

## 2025-09-03 PROCEDURE — 99999 PR PBB SHADOW E&M-EST. PATIENT-LVL III: CPT | Mod: PBBFAC,,, | Performed by: SURGERY

## 2025-09-03 PROCEDURE — 1101F PT FALLS ASSESS-DOCD LE1/YR: CPT | Mod: CPTII,S$GLB,, | Performed by: SURGERY

## 2025-09-04 DIAGNOSIS — M76.822 INSUFFICIENCY OF LEFT POSTERIOR TIBIAL TENDON: Primary | ICD-10-CM

## 2025-09-04 RX ORDER — CELECOXIB 100 MG/1
100 CAPSULE ORAL 2 TIMES DAILY PRN
Qty: 60 CAPSULE | Refills: 2 | Status: SHIPPED | OUTPATIENT
Start: 2025-09-04 | End: 2025-11-03

## (undated) DEVICE — GAUZE WOVEN STRL 12-PLY 4X4IN

## (undated) DEVICE — GOWN POLY REINF BRTH SLV XL

## (undated) DEVICE — SUT MONOCYRL 4-0 PS2 UND

## (undated) DEVICE — SEE MEDLINE ITEM 157150

## (undated) DEVICE — PAD CAST SPECIALIST STRL 6

## (undated) DEVICE — Device

## (undated) DEVICE — PAD ABD 8X10 STERILE

## (undated) DEVICE — SEE MEDLINE ITEM 157169

## (undated) DEVICE — COVER LIGHT HANDLE 80/CA

## (undated) DEVICE — CLOSURE SKIN STERI STRIP 1/2X4

## (undated) DEVICE — PAD COLD THERAPY KNEE WRAP ON

## (undated) DEVICE — UNDERGLOVES BIOGEL PI SIZE 7.5

## (undated) DEVICE — BLADE SHAVER LANZA 4.2X13CM

## (undated) DEVICE — GLOVE BIOGEL SKINSENSE PI 8.0

## (undated) DEVICE — UNDERGLOVES BIOGEL PI SIZE 8.5

## (undated) DEVICE — APPLICATOR CHLORAPREP ORN 26ML

## (undated) DEVICE — SEE MEDLINE ITEM 152564

## (undated) DEVICE — DRESSING XEROFORM FOIL PK 1X8

## (undated) DEVICE — GOWN IMPERVIOUS BLUE XXL

## (undated) DEVICE — DRAPE STERI U-SHAPED 47X51IN

## (undated) DEVICE — SOL NACL 0.9% IV INJ 1000ML

## (undated) DEVICE — SOL IRR NACL .9% 3000ML

## (undated) DEVICE — SUT MONOCRYL 4-0 PS-2

## (undated) DEVICE — SOL 9P NACL IRR PIC IL

## (undated) DEVICE — DRAPE LAP T SHT W/ INSTR PAD

## (undated) DEVICE — GAUZE SPONGE 4X4 12PLY

## (undated) DEVICE — GOWN B1 X-LG X-LONG

## (undated) DEVICE — TRAY MINOR GEN SURG OMC

## (undated) DEVICE — PENCIL ROCKER SWITCH 10FT CORD

## (undated) DEVICE — TOURNIQUET SB QC DP 34X4IN

## (undated) DEVICE — UNDERGLOVES BIOGEL PI SIZE 8

## (undated) DEVICE — TUBE SET INFLOW/OUTFLOW

## (undated) DEVICE — SPONGE COTTON TRAY 4X4IN

## (undated) DEVICE — PAD ELECTRODE STER 1.5X3

## (undated) DEVICE — SUT VICRYL 3-0 27 SH

## (undated) DEVICE — NDL HYPO REG 25G X 1 1/2

## (undated) DEVICE — COVER CAMERA OPERATING ROOM

## (undated) DEVICE — SUT VICRYL CTD 2-0 GI 27 SH

## (undated) DEVICE — ELECTRODE MEGADYNE RETURN DUAL

## (undated) DEVICE — ADHESIVE MASTISOL VIAL 48/BX